# Patient Record
Sex: FEMALE | Race: WHITE | Employment: FULL TIME | URBAN - METROPOLITAN AREA
[De-identification: names, ages, dates, MRNs, and addresses within clinical notes are randomized per-mention and may not be internally consistent; named-entity substitution may affect disease eponyms.]

---

## 2017-06-20 ENCOUNTER — TRANSFERRED RECORDS (OUTPATIENT)
Dept: HEALTH INFORMATION MANAGEMENT | Facility: CLINIC | Age: 27
End: 2017-06-20

## 2017-06-23 ENCOUNTER — TRANSFERRED RECORDS (OUTPATIENT)
Dept: HEALTH INFORMATION MANAGEMENT | Facility: CLINIC | Age: 27
End: 2017-06-23

## 2017-06-30 ENCOUNTER — TRANSFERRED RECORDS (OUTPATIENT)
Dept: HEALTH INFORMATION MANAGEMENT | Facility: CLINIC | Age: 27
End: 2017-06-30

## 2017-11-09 ENCOUNTER — ONCOLOGY VISIT (OUTPATIENT)
Dept: ONCOLOGY | Facility: CLINIC | Age: 27
End: 2017-11-09
Attending: CLINICAL NURSE SPECIALIST
Payer: COMMERCIAL

## 2017-11-09 VITALS
DIASTOLIC BLOOD PRESSURE: 68 MMHG | SYSTOLIC BLOOD PRESSURE: 104 MMHG | TEMPERATURE: 98.2 F | HEART RATE: 71 BPM | OXYGEN SATURATION: 100 % | WEIGHT: 117 LBS

## 2017-11-09 DIAGNOSIS — Z15.01 GENETIC PREDISPOSITION TO BREAST CANCER: ICD-10-CM

## 2017-11-09 DIAGNOSIS — Z15.09 BRCA1 POSITIVE: Primary | ICD-10-CM

## 2017-11-09 DIAGNOSIS — Z15.01 BRCA1 POSITIVE: Primary | ICD-10-CM

## 2017-11-09 DIAGNOSIS — Z80.3 FAMILY HISTORY OF MALIGNANT NEOPLASM OF BREAST: ICD-10-CM

## 2017-11-09 PROCEDURE — 99204 OFFICE O/P NEW MOD 45 MIN: CPT | Performed by: CLINICAL NURSE SPECIALIST

## 2017-11-09 PROCEDURE — 99211 OFF/OP EST MAY X REQ PHY/QHP: CPT

## 2017-11-09 ASSESSMENT — PAIN SCALES - GENERAL: PAINLEVEL: NO PAIN (0)

## 2017-11-09 NOTE — MR AVS SNAPSHOT
After Visit Summary   11/9/2017    Ilda Hernandez    MRN: [id number]           Patient Information     Date Of Birth          1990        Visit Information        Provider Department      11/9/2017 9:00 AM Dianne William APRN [facility]        Today's Diagnoses     BRCA1 positive    -  1    Genetic predisposition to breast cancer        Family history of malignant neoplasm of breast          Care Instructions    Individualized Surveillance Plan for women  Hereditary Breast and/or Ovarian Cancer Syndrome   Per NCCN Guidelines Version 1.2018   Recommended screening Test or procedure Last done Next Scheduled    Breast self awareness- starting at age 18. Women should be familiar with their breasts and promptly report changes to their care provider. Periodic, consistent self exam may facilitate breast self awareness.    11/9/2017   ongoing   Breast screening, starting at age 25 Clinical breast exams every 6 -12 months 11/9/2017 Discuss - May or November 2018   Breast screening   Age 25-29 Annual breast MRI screening with contrast (preferred) or mammogram if MRI is unavailable or individualized based on family history if a breast cancer diagnosis before age 30 is present.       Breast MRI is performed preferably on day 7-15 of menstrual cycle for premenopausal women. 6/30/2017-R US, lower out quadrant - BiRads3    Rec. Follow up in 6 months NEXT: Breast MRI ordered today    Next: Nov. 2018   Breast screening   Age >30-75 years     Annual mammogram and   annual breast MRI, preferably on day 7-15 of menstrual cycle for premenopausal women.    Age>75 years, management should be considered on an individual basis.   NA   NA   Ovarian cancer screening, starting at age 30-35 Consider transvaginal ultrasound and  tests. Discuss pedigree - get copy if possible Discuss   Recommendation: risk-reducing salpingo-oophorectomy, typically between 35 and 40 years old and  upon  completion of child bearing.     Because ovarian cancer onset in patients with BRCA2 mutations is on average of 8-10 years later than in patients with BRCA1 mutations, it is reasonable to delay risk-reducing salpingo-oophorectomy until 40-45 years in patients with BRCA2 mutations who have already maximized their breast cancer prevention (i.e., undergone bilateral mastectomy).    Salpingectomy alone is not the standard of care for risk reduction although clinical trials are ongoing.     Discuss option of risk-reducing mastectomy.    Consider risks and benefits of risk reducing agents, such as tamoxifen and raloxifene for breast and ovarian cancer.    Consider a full body skin and eye exam for melanoma screening for both BRCA1+ and BRCA2+.     NOTE: Women with BRCA mutation who are treated for breast cancer should have screening of the remaining breast tissue with annual mammography and breast MRI.    The International Cancer of the Pancreas Screening (CAPS) Consortium recommends that individuals with a BRCA2 mutation who have at least one first-degree relative with pancreatic cancer should undergo initial screening with endoscopic ultrasonography and/or MRI/magnetic resonance cholangiopancreatography.                 Follow-ups after your visit        Additional Services     GENERAL SURG ADULT REFERRAL       Your provider has referred you to: PREFERRED PROVIDERS: Dr. Henderson, Dr. Berry and Dr. Liz Otero     Please be aware that coverage of these services is subject to the terms and limitations of your health insurance plan.  Call member services at your health plan with any benefit or coverage questions.      Please bring the following with you to your appointment:    (1) Any X-Rays, CTs or MRIs which have been performed.  Contact the facility where they were done to arrange for  prior to your scheduled appointment.   (2) List of current medications   (3) This referral request   (4) Any documents/labs given  to you for this referral            OB/GYN REFERRAL       Your provider has referred you to:  [facility]: [facility] [phone]   [email or web address]    Please be aware that coverage of these services is subject to the terms and limitations of your health insurance plan.  Call member services at your health plan with any benefit or coverage questions.      Please bring the following with you to your appointment:    (1) Any X-Rays, CTs or MRIs which have been performed.  Contact the facility where they were done to arrange for  prior to your scheduled appointment.   (2) List of current medications   (3) This referral request   (4) Any documents/labs given to you for this referral            PLASTIC SURGERY REFERRAL       Your provider has referred you to: [facility] [phone]   [email or web address]    Please be aware that coverage of these services is subject to the terms and limitations of your health insurance plan.  Call member services at your health plan with any benefit or coverage questions.      Please bring the following with you to your appointment:    (1) Any X-Rays, CTs or MRIs which have been performed.  Contact the facility where they were done to arrange for  prior to your scheduled appointment.    (2) List of current medications  (3) This referral request   (4) Any documents/labs given to you for this referral                  Follow-up notes from your care team     Return in about 6 months (around 5/9/2018) for Physical Exam.      Your next 10 appointments already scheduled     Nov 05, 2018  4:30 PM CST   MR BREAST BILATERAL W/O & W CONTRAST with SHMRP1   [facility] ([facility])    [address] [place] [id number] [phone]           Take your  medicines as usual, unless your doctor tells you not to. Bring a list of your current medicines to your exam (including vitamins, minerals and over-the-counter drugs).  The timing of your exam may depend on the start of your last period. If you re in menopause, you may have the exam anytime.  Please bring any previous mammograms or breast MRIs from other facilities to the MRI dept. Do not mail these items to us.  You will have contrast for this exam. To prepare:   The day before your exam, drink extra fluids at least six 8-ounce glasses (unless your doctor tells you to restrict your fluids).   Have a blood test (creatinine test) within 30 days of your exam. Go to your clinic or Diagnostic Imaging Department for this test.  The MRI machine uses a strong magnet. Please wear clothes without metal (snaps, zippers). A sweatsuit works well, or we may give you a hospital gown.  Please remove any body piercings and hair extensions before you arrive. You will also remove watches, jewelry, hairpins, wallets, dentures, partial dental plates and hearing aids. You may wear contact lenses, and you may be able to wear your rings. We have a safe place to keep your personal items, but it is safer to leave them at home.   **IMPORTANT** THE INSTRUCTIONS BELOW ARE ONLY FOR THOSE PATIENTS WHO HAVE BEEN TOLD THEY WILL RECEIVE SEDATION OR GENERAL ANESTHESIA DURING THEIR MRI PROCEDURE:  IF YOU WILL RECEIVE SEDATION (take medicine to help you relax during your exam)   You must get the medicine from your doctor before you arrive. Bring the medicine to the exam. Do not take it at home.   Arrive one hour early. Bring someone who can take you home after the test. Your medicine will make you sleepy. After the exam, you may not drive, take a bus or take a taxi by yourself.   No eating 8 hours before your exam. You may have clear liquids up until 4 hours before your exam. (Clear liquids include water, clear tea, black coffee and fruit juice  without pulp.)  IF YOU WILL RECEIVE ANESTHESIA (be asleep for your exam)   Arrive 1 1/2 hours early. Bring someone who can take you home after the test. You may not drive, take a bus or take a taxi by yourself.   No eating 8 hours before your exam. You may have clear liquids up until 4 hours before your exam. (Clear liquids include water, clear tea, black coffee and fruit juice without pulp.)  If you have any questions, please contact your Imaging Department exam site.              Who to contact     If you have questions or need follow up information about today's clinic visit or your schedule please contact [facility] directly at [phone].  Normal or non-critical lab and imaging results will be communicated to you by Exabrehart, letter or phone within 4 business days after the clinic has received the results. If you do not hear from us within 7 days, please contact the clinic through [employer] or phone. If you have a critical or abnormal lab result, we will notify you by phone as soon as possible.  Submit refill requests through [employer] or call your pharmacy and they will forward the refill request to us. Please allow 3 business days for your refill to be completed.          Additional Information About Your Visit        Cloneless Information     [employer] gives you secure access to your electronic health record. If you see a primary care provider, you can also send messages to your care team and make appointments. If you have questions, please call your primary care clinic.  If you do not have a primary care provider, please call [phone] and they will assist you.        Care EveryWhere ID     This is your Care EveryWhere ID. This could be used by other organizations to access your [facility] medical records  [id number]        Your Vitals Were     Pulse Temperature Pulse Oximetry             71 98.2  F (36.8  C) (Oral) 100%          Blood Pressure from Last 3 Encounters:   11/09/17 104/68     Weight from Last 3 Encounters:   11/09/17 53.1 kg (117 lb)              We Performed the Following     GENERAL SURG ADULT REFERRAL     OB/GYN REFERRAL     PLASTIC SURGERY REFERRAL        Primary Care Provider    None Specified       No primary provider on file.        Equal Access to Services     CHETAN HERNANDEZ : Hadii aad ku hadrobina Bautista, billy lukati, yimi kadoug vieira, radha hannain hayaabonita murrayestrellacarla perez. So [facility] [phone].    ATENCIÓN: Si habla español, tiene a reilly disposición servicios gratuitos de asistencia lingüística. Llame al [phone].    We comply with applicable federal civil rights laws and [place] laws. We do not discriminate on the basis of race, color, national origin, age, disability, sex, sexual orientation, or gender identity.            Thank you!     Thank you for choosing [facility]  for your care. Our goal is always to provide you with excellent care. Hearing back from our patients is one way we can continue to improve our services. Please take a few minutes to complete the written survey that you may receive in the mail after your visit with us. Thank you!             Your Updated Medication List - Protect others around you: Learn how to safely use, store and throw away your medicines at www.disposemymeds.org.      Notice  As of 11/9/2017 11:59 PM    You have not been prescribed any medications.

## 2017-11-09 NOTE — PATIENT INSTRUCTIONS
Individualized Surveillance Plan for women  Hereditary Breast and/or Ovarian Cancer Syndrome   Per NCCN Guidelines Version 1.2018   Recommended screening Test or procedure Last done Next Scheduled    Breast self awareness- starting at age 18. Women should be familiar with their breasts and promptly report changes to their care provider. Periodic, consistent self exam may facilitate breast self awareness.    11/9/2017   ongoing   Breast screening, starting at age 25 Clinical breast exams every 6 -12 months 11/9/2017 Discuss - May or November 2018   Breast screening   Age 25-29 Annual breast MRI screening with contrast (preferred) or mammogram if MRI is unavailable or individualized based on family history if a breast cancer diagnosis before age 30 is present.       Breast MRI is performed preferably on day 7-15 of menstrual cycle for premenopausal women. 6/30/2017-R US, lower out quadrant - BiRads3    Rec. Follow up in 6 months NEXT: Breast MRI ordered today    Next: Nov. 2018   Breast screening   Age >30-75 years     Annual mammogram and   annual breast MRI, preferably on day 7-15 of menstrual cycle for premenopausal women.    Age>75 years, management should be considered on an individual basis.   NA   NA   Ovarian cancer screening, starting at age 30-35 Consider transvaginal ultrasound and  tests. Discuss pedigree - get copy if possible Discuss   Recommendation: risk-reducing salpingo-oophorectomy, typically between 35 and 40 years old and  upon completion of child bearing.     Because ovarian cancer onset in patients with BRCA2 mutations is on average of 8-10 years later than in patients with BRCA1 mutations, it is reasonable to delay risk-reducing salpingo-oophorectomy until 40-45 years in patients with BRCA2 mutations who have already maximized their breast cancer prevention (i.e., undergone bilateral mastectomy).    Salpingectomy alone is not the standard of care for risk reduction although clinical trials  are ongoing.     Discuss option of risk-reducing mastectomy.    Consider risks and benefits of risk reducing agents, such as tamoxifen and raloxifene for breast and ovarian cancer.    Consider a full body skin and eye exam for melanoma screening for both BRCA1+ and BRCA2+.     NOTE: Women with BRCA mutation who are treated for breast cancer should have screening of the remaining breast tissue with annual mammography and breast MRI.    The International Cancer of the Pancreas Screening (CAPS) Consortium recommends that individuals with a BRCA2 mutation who have at least one first-degree relative with pancreatic cancer should undergo initial screening with endoscopic ultrasonography and/or MRI/magnetic resonance cholangiopancreatography.

## 2017-11-09 NOTE — PROGRESS NOTES
Oncology Rooming Note    November 9, 2017 9:01 AM   Ilda Hernandez is a 27 year old female who presents for:    Chief Complaint   Patient presents with     Oncology Clinic Visit     Initial Vitals: /68 (BP Location: Right arm, Patient Position: Chair, Cuff Size: Adult Regular)  Pulse 71  Temp 98.2  F (36.8  C) (Oral)  Wt 53.1 kg (117 lb)  SpO2 100% There is no height or weight on file to calculate BMI. There is no height or weight on file to calculate BSA.  No Pain (0) Comment: Data Unavailable   No LMP recorded.  Allergies reviewed: Yes  Medications reviewed: Yes    Medications: Medication refills not needed today.  Pharmacy name entered into EPIC: Data Unavailable    Clinical concerns: None     5 minutes for nursing intake (face to face time)     Jaimie Mendiola CMA

## 2017-11-09 NOTE — LETTER
Cancer Risk Management  Program Locations    [facility]  [facility]  [facility]  Mailing Address  Cancer Risk Management Program  [facility]  [address]  [place] [address]    New patient appointments  [phone]  November 9, 2017    Ilda Hernandez  [address] [place] [address]      Dear Ilda,    It was a pleasure meeting with you today.  Below is a copy of my note from our visit, outlining your surveillance plan.      I look forward to seeing you in the future to coordinate your care and reduce your health risks. Please feel free to contact me if you have any questions or concerns.      Oncology Rooming Note    November 9, 2017 9:01 AM   Ilda Hernandez is a 27 year old female who presents for:    Chief Complaint   Patient presents with     Oncology Clinic Visit     Initial Vitals: /68 (BP Location: Right arm, Patient Position: Chair, Cuff Size: Adult Regular)  Pulse 71  Temp 98.2  F (36.8  C) (Oral)  Wt 53.1 kg (117 lb)  SpO2 100% There is no height or weight on file to calculate BMI. There is no height or weight on file to calculate BSA.  No Pain (0) Comment: Data Unavailable   No LMP recorded.  Allergies reviewed: Yes  Medications reviewed: Yes    Medications: Medication refills not needed today.  Pharmacy name entered into EPIC: Data Unavailable    Clinical concerns: None     5 minutes for nursing intake (face to face time)     Jaimie Mendiola CMA                Oncology Risk Management Consultation:  Date on this visit: 11/9/2017    Ilda Hernandez  is self referred  for an oncology risk management consultation. She requires evaluation for her risk of cancer secondary to having a deleterious BRCA1 mutation and is considered to be at high risk for hereditary breast and ovarian  cancer. She has been having high risk screening for several years at [facility] and most recently at [facility]. She  recently moved to [place] and wants to continue her screening plan.     Primary Physician: No primary care provider on file.     History Of Present Illness:  Ms. Hernandez is a very pleasant, healthy 27 year old female who presents with family history of breast and ovarian cancer and a personal diagnosis of a BRCA1 mutation.     Genetic Testing:  Reports BRCA1+, records requested from [facility] (under the name of Ilda Redman).    Pertinent history:  January 2012 - Breast biopsy for lump in L breast, pathology showed fibroadenoma.    Pertinent screening history:  8/17/2014 - Breast MRI ([facility]), BiRads3, stable 12 o'clock area of R breast, dense breasts noted.    12/22/2014 - B Ultrasound, BiRads2  8/18/2015 - Breast MRI, stable, BiRads1  6/23/2016 - Breast MRI and B ultrasound, negative ([facility])  6/20/2017 - Breast MRI ([facility]), heterogeneously dense breasts noted; focal area of non mass enhancement in extreme lower slightly outer area of R breast; targeted US recommended.       Past Medical/Surgical History:  Past Medical History:   Diagnosis Date     BRCA1 positive 2012     Past Surgical History:   Procedure Laterality Date     BREAST LUMPECTOMY, RT/LT Left 01/2012    fibroadenoma       Allergies:  Allergies as of 11/09/2017     (No Known Allergies)       Current Medications:  No current outpatient prescriptions on file.        Family History:  Family History   Problem Relation Age of Onset     Breast Cancer Mother 42     Hereditary Breast and Ovarian Cancer Syndrome Mother      BRCA1+     Breast Cancer Maternal Grandmother 27       Social History:  Social History     Social History     Marital status:      Spouse name: N/A     Number of children: 0     Years of education: N/A     Occupational  History           Social History Main Topics     Smoking status: Never Smoker     Smokeless tobacco: Never Used     Alcohol use Yes      Comment: maybe 1/2 to 1 per week     Drug use: No     Sexual activity: Yes     Partners: Male     Birth control/ protection: Condom     Other Topics Concern     Not on file     Social History Narrative     No narrative on file       Review of Systems:  GENERAL: No change in weight, sleep or appetite.  Normal energy.  No fever or chills  EYES: Negative for vision changes or eye problems  ENT: No problems with ears, nose or throat.  No difficulty swallowing.  RESP: No coughing, wheezing or shortness of breath  CV: No chest pains or palpitations  GI: No nausea, vomiting,  heartburn, abdominal pain, diarrhea, constipation or change in bowel habits  : No urinary frequency or dysuria, bladder or kidney problems  MUSCULOSKELETAL: No significant muscle or joint pains  NEUROLOGIC: No headaches, numbness, tingling, weakness, problems with balance or coordination  PSYCHIATRIC: No problems with anxiety, depression or mental health  HEME/IMMUNE/ALLERGY: No history of bleeding or clotting problems or anemia.  No allergies or immune system problems  ENDOCRINE: No history of thyroid disease, diabetes or other endocrine disorders  SKIN: No rashes,worrisome lesions or skin problems    Physical Exam:  /68 (BP Location: Right arm, Patient Position: Chair, Cuff Size: Adult Regular)  Pulse 71  Temp 98.2  F (36.8  C) (Oral)  Wt 53.1 kg (117 lb)  SpO2 100%    GENERAL APPEARANCE: healthy, alert and no apparent distress  NECK: no adenopathy, no asymmetry or masses     RESP: lungs clear to auscultation - no rales, rhonchi or wheezes    BREAST: A multipositional, bilateral breast exam was performed. Very symmetrical breasts. Right breast: no palpable dominant masses, no nipple discharge, no skin changes. Dense tissue.  Right axilla: no palpable adenopathy. Left breast: no palpable  dominant masses, no nipple discharge, no skin changes. Left axilla: no palpable adenopathy. Dense tissue.    LYMPHATICS: No cervical, supraclavicular or axillary lymphadenopathy     CARDIOVASCULAR: regular rate and rhythm, normal S1 S2, no S3 or S4 and no murmur.       SKIN: no suspicious lesions or rashes  Laboratory/Imaging Studies  No results found for this or any previous visit.    ASSESSMENT  We discussed the differences between cancer risk for the general population and those with BRCA mutations. Women with BRCA mutations have a 40-80% lifetime risk of breast cancer, compared to the general population risk of 12%. Those with a BRCA mutation also bear a 10-40% lifetime risk of ovarian cancer, compared to the 1-2% lifetime risk within the general population.      We also discussed that inheriting a mutation does not mean that a person will develop cancer, but rather that they are at increased risk.       We also discussed the risks and benefits of risk reduction mastectomy, which could decrease her risk of breast cancer by 90%. She is aware of this as an option and is interested in pursuing this further. Her previous health care providers have suggested that she should meet with Dr. Henderson to discuss this further.  I have put in several surgical referrals for her as she is interested in exploring her options.  She is also connected to the [employer] network and may be attending meetings locally in order to learn more about breast surgery experiences locally.    We also discussed the risks and benefits of prophylactic bilateral salpingo-ooperectomy after child bearing. She is planning to start a family within the next couple of years.    We discussed recommendations for birth control. A large meta-analysis of the literature showed that in a total of 2855 breast cancer cases and 1503 ovarian cancer cases, carrying an ascertained BRCA1/2 mutation in 18 studies that in oral contraceptive formulations used prior to 1975  women showed a significant increased risk of breast cancer (SRR: 1.47; 95% 1.06, 2.04).  However, there is no evidence of a significant association with the use of more recent formulations (SRR: 1.17; 95% 0.74, 1.86). The use of oral contraceptives was associated with a significant reduced risk of ovarian cancer for BRCA1/2 carriers (summary relative risk (SRR) = 0.50; 95% confidence interval (CI), 0.33-0.75) with a significant 36% risk reduction in ovarian cancers for each additional 10 years of oral contraceptive use (SRR: 0.64; 95% CI, 0.53-0.78; P trend < 0.01). (James et al.2010.Oral contraceptive use and breast or ovarian cancer risk in BRCA1/2 carriers: A meta-analysis.  Journal of Cancer. 46:12: 4506-7591.     We also discussed following  a healthy lifestyle plan recommended by both NCCN and the American Cancer Society that can reduce the risk of cancer:  1 Limit alcohol consumption to less than 1 drink per day (1 drink=5 oz.wine, 12 oz. Beer or 1.5 oz. 80-proof liquor).  2. Exercise per American Cancer Society guidelines of at least 150 minutes of moderate-intensity activity or 75 minutes of vigorous activity each week. (Or a combination of both.) Exercise should be spread  out over the week.  3. Maintain a healthy weight with a Body Mass Index between 19-24.9.  4. Do not use tobacco products and limit exposure to passive smoke.    INDIVIDUALIZED CANCER RISK MANAGEMENT PLAN:  Individualized Surveillance Plan for women  Hereditary Breast and/or Ovarian Cancer Syndrome   Per NCCN Guidelines Version 1.2018   Recommended screening Test or procedure Last done Next Scheduled    Breast self awareness- starting at age 18. Women should be familiar with their breasts and promptly report changes to their care provider. Periodic, consistent self exam may facilitate breast self awareness.    11/9/2017   ongoing   Breast screening, starting at age 25 Clinical breast exams every 6 -12 months 11/9/2017  May  2018    Breast screening   Age 25-29 Annual breast MRI screening with contrast (preferred) or mammogram if MRI is unavailable or individualized based on family history if a breast cancer diagnosis before age 30 is present.       Breast MRI is performed preferably on day 7-15 of menstrual cycle for premenopausal women. 6/30/2017-R US, lower out quadrant - BiRads3    Rec. Follow up in 6 months NEXT: Breast MRI ordered today     Breast screening   Age >30-75 years     Annual mammogram and   annual breast MRI, preferably on day 7-15 of menstrual cycle for premenopausal women.    Age>75 years, management should be considered on an individual basis.   NA   NA   Ovarian cancer screening, starting at age 30-35 Consider transvaginal ultrasound and  tests. Discuss pedigree - get copy if possible Discuss   Recommendation: risk-reducing salpingo-oophorectomy, typically between 35 and 40 years old and  upon completion of child bearing.     Because ovarian cancer onset in patients with BRCA2 mutations is on average of 8-10 years later than in patients with BRCA1 mutations, it is reasonable to delay risk-reducing salpingo-oophorectomy until 40-45 years in patients with BRCA2 mutations who have already maximized their breast cancer prevention (i.e., undergone bilateral mastectomy).    Salpingectomy alone is not the standard of care for risk reduction although clinical trials are ongoing.     Discuss option of risk-reducing mastectomy.    Consider risks and benefits of risk reducing agents, such as tamoxifen and raloxifene for breast and ovarian cancer.    Consider a full body skin and eye exam for melanoma screening for both BRCA1+ and BRCA2+.     NOTE: Women with BRCA mutation who are treated for breast cancer should have screening of the remaining breast tissue with annual mammography and breast MRI.    The International Cancer of the Pancreas Screening (CAPS) Consortium recommends that individuals with a BRCA2 mutation who have  at least one first-degree relative with pancreatic cancer should undergo initial screening with endoscopic ultrasonography and/or MRI/magnetic resonance cholangiopancreatography.       I will be happy to work with her to manage her cancer risk, will follow up on her screenings and see her in the Cancer Risk Management clinic in six months.    I spent 47 minutes with the patient with greater that 50% of it in counseling and coordinating care as documented above.    Dianne William, APRN-CNS, OCN, ANG-BC  Clinical Nurse Specialist  Cancer Risk Management Program  [address]  [place] [address]    phone:  [phone]  Pager: [phone]  fax: [phone]    Cc: MD Anita Jackson MD Umar Choudry, MD Sarah Wildenberg, MD

## 2017-12-13 ENCOUNTER — HOSPITAL ENCOUNTER (OUTPATIENT)
Dept: MRI IMAGING | Facility: CLINIC | Age: 27
Discharge: HOME | End: 2017-12-13
Attending: CLINICAL NURSE SPECIALIST | Admitting: CLINICAL NURSE SPECIALIST
Payer: COMMERCIAL

## 2017-12-13 DIAGNOSIS — Z15.01 BRCA1 POSITIVE: ICD-10-CM

## 2017-12-13 DIAGNOSIS — Z80.3 FAMILY HISTORY OF MALIGNANT NEOPLASM OF BREAST: ICD-10-CM

## 2017-12-13 DIAGNOSIS — Z15.09 BRCA1 POSITIVE: ICD-10-CM

## 2017-12-13 DIAGNOSIS — Z15.01 GENETIC PREDISPOSITION TO BREAST CANCER: ICD-10-CM

## 2017-12-13 PROCEDURE — A9585 GADOBUTROL INJECTION: HCPCS | Performed by: CLINICAL NURSE SPECIALIST

## 2017-12-13 PROCEDURE — 0159T MR BREAST BILATERAL W/O & W CONTRAST: CPT

## 2017-12-13 PROCEDURE — 25000128 H RX IP 250 OP 636: Performed by: CLINICAL NURSE SPECIALIST

## 2017-12-13 RX ORDER — GADOBUTROL 604.72 MG/ML
5 INJECTION INTRAVENOUS ONCE
Status: COMPLETED | OUTPATIENT
Start: 2017-12-13 | End: 2017-12-13

## 2017-12-13 RX ADMIN — GADOBUTROL 6 ML: 604.72 INJECTION INTRAVENOUS at 17:02

## 2017-12-15 ENCOUNTER — ONCOLOGY VISIT (OUTPATIENT)
Dept: ONCOLOGY | Facility: CLINIC | Age: 27
End: 2017-12-15
Attending: SURGERY
Payer: COMMERCIAL

## 2017-12-15 DIAGNOSIS — Z15.01 BRCA1 POSITIVE: Primary | ICD-10-CM

## 2017-12-15 DIAGNOSIS — Z15.09 BRCA1 POSITIVE: Primary | ICD-10-CM

## 2017-12-15 PROCEDURE — 99212 OFFICE O/P EST SF 10 MIN: CPT | Mod: ZF

## 2017-12-15 NOTE — NURSING NOTE
Oncology Rooming Note    December 15, 2017 9:16 AM   Ilda Hernandez is a 27 year old female who presents for:    Chief Complaint   Patient presents with     Oncology Clinic Visit     New Patient BRCA pos     Initial Vitals: There were no vitals taken for this visit. There is no height or weight on file to calculate BMI. There is no height or weight on file to calculate BSA.  Data Unavailable Comment: Data Unavailable   No LMP recorded.  Allergies reviewed: Yes  Medications reviewed: Yes    Medications: Medication refills not needed today.  Pharmacy name entered into EPIC: Data Unavailable    Clinical concerns: new patient.  Dr. Henderson was NOT notified.    10 minutes for nursing intake (face to face time)   Patient has had her flu shot for 2017 at work in October.    Jovana Bedoya, CMA

## 2017-12-15 NOTE — MR AVS SNAPSHOT
After Visit Summary   12/15/2017    Ilda Hernandez    MRN: [id number]           Patient Information     Date Of Birth          1990        Visit Information        Provider Department      12/15/2017 9:00 AM Jose Daniel Henderson MD [facility]        Today's Diagnoses     BRCA1 positive    -  1       Follow-ups after your visit        Your next 10 appointments already scheduled     Feb 06, 2018  7:15 AM CST   (Arrive by 7:00 AM)   New Patient Visit with ALPESH Falcon MD   [facility] ([facility])    [address] [place] [id number] [phone]              Who to contact     If you have questions or need follow up information about today's clinic visit or your schedule please contact [facility] directly at [phone].  Normal or non-critical lab and imaging results will be communicated to you by [employer], letter or phone within 4 business days after the clinic has received the results. If you do not hear from us within 7 days, please contact the clinic through [employer] or phone. If you have a critical or abnormal lab result, we will notify you by phone as soon as possible.  Submit refill requests through [employer]PTV or call your pharmacy and they will forward the refill request to us. Please allow 3 business days for your refill to be completed.          Additional Information About Your Visit        MyChart Information     PPTV gives you secure access to your electronic health record. If you see a primary care provider, you can also send messages to your care team and make appointments. If you have questions, please call your primary care clinic.  If you do not have a primary care provider, please call [phone] and they will assist you.        Care EveryWhere ID     This is your Care EveryWhere ID. This could be used by other organizations to access your [facility] medical records  [id number]          Blood Pressure from Last 3 Encounters:   11/09/17 104/68    Weight from Last 3 Encounters:   11/09/17 53.1 kg (117 lb)              Today, you had the following     No orders found for display       Primary Care Provider Fax #    Physician No Ref-Primary [phone]       No address on file        Equal Access to Services     CHETAN RUELASJACKLYN : Hadii trini ku haddamiáno Soomaali, waaxda luqadaha, qaybta kaalmada aderosario, radha murrayestrellacarla gonzalez . So [facility] [phone].    ATENCIÓN: Si habla español, tiene a reilly disposición servicios gratuitos de asistencia lingüística. Llame al [phone].    We comply with applicable federal civil rights laws and [place] laws. We do not discriminate on the basis of race, color, national origin, age, disability, sex, sexual orientation, or gender identity.            Thank you!     Thank you for choosing [facility]  for your care. Our goal is always to provide you with excellent care. Hearing back from our patients is one way we can continue to improve our services. Please take a few minutes to complete the written survey that you may receive in the mail after your visit with us. Thank you!             Your Updated Medication List - Protect others around you: Learn how to safely use, store and throw away your medicines at www.disposemymeds.org.      Notice  As of 12/15/2017 11:59 PM    You have not been prescribed any medications.

## 2017-12-15 NOTE — PROGRESS NOTES
Ilda Hernandez is a 27-year-old woman I was asked to see at the request of Dianne William for evaluation of a BRCA1 mutation.  The patient has a strong family history of breast cancer including her mother, maternal aunt and maternal grandmother.  Her mother was tested positive for BRCA1 mutation, and several years ago, Ilda underwent testing and also has a BRCA1 mutation.  Since that time, she has undergone screening with MRI.  She has had no breast problems except for a benign fibroadenoma that was removed in the past.  She has no breast complaints today.  She is here to talk about the role of risk-reduction mastectomy.      PAST MEDICAL HISTORY:  No major medical problems.  She takes no medications on a regular basis.      PHYSICAL EXAMINATION:  Not performed.      IMPRESSION:  BRCA1 mutation.      PLAN:  The patient understands her high risk of breast cancer and ovarian cancer.  She is interested in still having children and does not want to have a risk-reduction oophorectomy at this time.  She would like to have a bilateral mastectomy with immediate reconstruction sometime in the summer.  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  I talked about the role of nipple preservation, and I think she is a good candidate for that.  I talked to her about the risks and complications of surgery.  I am going to have her see a couple of plastic surgeons for their input.  I would like to see her back in the spring to help plan her surgical procedure.        TT:  40 minutes.  CT:  40 minutes.      cc:   GIULIANO Brooks, [facility]   [address] [place] [address]  [phone]

## 2018-01-09 ENCOUNTER — TRANSFERRED RECORDS (OUTPATIENT)
Dept: HEALTH INFORMATION MANAGEMENT | Facility: CLINIC | Age: 28
End: 2018-01-09

## 2018-01-21 ENCOUNTER — HEALTH MAINTENANCE LETTER (OUTPATIENT)
Age: 28
End: 2018-01-21

## 2018-02-08 ENCOUNTER — MYC MEDICAL ADVICE (OUTPATIENT)
Dept: PLASTIC SURGERY | Facility: CLINIC | Age: 28
End: 2018-02-08

## 2018-02-20 ENCOUNTER — OFFICE VISIT (OUTPATIENT)
Dept: PLASTIC SURGERY | Facility: CLINIC | Age: 28
End: 2018-02-20
Attending: PLASTIC SURGERY
Payer: COMMERCIAL

## 2018-02-20 VITALS
TEMPERATURE: 97 F | BODY MASS INDEX: 19.27 KG/M2 | DIASTOLIC BLOOD PRESSURE: 59 MMHG | HEART RATE: 64 BPM | OXYGEN SATURATION: 100 % | HEIGHT: 66 IN | WEIGHT: 119.9 LBS | SYSTOLIC BLOOD PRESSURE: 99 MMHG

## 2018-02-20 DIAGNOSIS — Z15.09 BRCA GENE POSITIVE: Primary | ICD-10-CM

## 2018-02-20 DIAGNOSIS — Z15.01 BRCA GENE POSITIVE: Primary | ICD-10-CM

## 2018-02-20 DIAGNOSIS — Z15.09 BRCA1 POSITIVE: Primary | ICD-10-CM

## 2018-02-20 DIAGNOSIS — Z15.01 BRCA1 POSITIVE: Primary | ICD-10-CM

## 2018-02-20 PROCEDURE — G0463 HOSPITAL OUTPT CLINIC VISIT: HCPCS | Mod: ZF

## 2018-02-20 ASSESSMENT — PAIN SCALES - GENERAL: PAINLEVEL: NO PAIN (0)

## 2018-02-20 NOTE — NURSING NOTE
"Oncology Rooming Note    February 20, 2018 7:58 AM   Ilda Hernandez is a 27 year old female who presents for:    Chief Complaint   Patient presents with     Oncology Clinic Visit     New Patient-Consult     Initial Vitals: BP 99/59 (BP Location: Right arm, Patient Position: Sitting, Cuff Size: Adult Regular)  Pulse 64  Temp 97  F (36.1  C) (Oral)  Ht 1.676 m (5' 6\")  Wt 54.4 kg (119 lb 14.4 oz)  LMP 01/18/2018  SpO2 100%  BMI 19.35 kg/m2 Estimated body mass index is 19.35 kg/(m^2) as calculated from the following:    Height as of this encounter: 1.676 m (5' 6\").    Weight as of this encounter: 54.4 kg (119 lb 14.4 oz). Body surface area is 1.59 meters squared.  No Pain (0) Comment: Data Unavailable   Patient's last menstrual period was 01/18/2018.  Allergies reviewed: Yes  Medications reviewed: Yes    Medications: Medication refills not needed today.  Pharmacy name entered into EPIC: Data Unavailable    Clinical concerns: Questions Dr. Falcon was notified.    10 minutes for nursing intake (face to face time)     Lupe Barraza LPN              "

## 2018-02-20 NOTE — PROGRESS NOTES
REFERRING PHYSICIAN:  Jose Daniel Henderson MD, [facility]       PRESENTING COMPLAINT:  Consultation for breast reconstruction.      HISTORY OF PRESENT ILLNESS:  Ms. Hernandez is 27 years old.  She has been diagnosed with BRCA1 gene mutation and is contemplating bilateral prophylactic mastectomy and is interested in immediate reconstruction.  She is here to discuss options.  She is a teacher by profession.  Has an active lifestyle, works out 3-4 times a week, has no back pain.  Wears a C-cup bra.  She would like to be around a cup size larger.      PAST MEDICAL HISTORY:  Nil.      PAST SURGICAL HISTORY:  Fibroadenoma excision from the left breast.      MEDICATIONS:  Nil.      ALLERGIES:  Nil.      SOCIAL HISTORY:  Does not smoke.  Drinks socially.  Works as a teacher in [place].  Lives in [place].      REVIEW OF SYSTEMS:  Denies chest pain, shortness of breath, MI, CVA, DVT and PE.      PHYSICAL EXAMINATION:  On exam vital signs stable.  She is afebrile in no obvious distress.  She is 5 feet 6 inches, 119 pounds with a BMI of 19 kg/m2.  On examination of her breasts, she has relatively symmetric breasts, left side may be slightly larger than the right side.  Sternal notch to nipple distance 30 cm.  She has grade 0 ptosis of her nipple areolar complexes.  On my estimation, she is a large B cup.  She has very little extra tissue in the abdomen.  She has no obvious hernia.  Skin pinch thickness under 2 cm.  No scars on the back.      ASSESSMENT AND PLAN:  Based upon the above findings, a diagnosis of BRCA1 gene mutation was made.  I had a long discussion with the patient and her  about breast reconstruction.  Gave them the overview of breast reconstruction, immediate versus delayed, implant-based versus autologous reconstruction were all explained.  Had a discussion with her about nipple-sparing mastectomy.  In my opinion, the best aesthetic results would be if she did a nipple-sparing mastectomy through the inframammary  fold/lateral mammary fold incision with an immediate expander-based reconstruction followed by placement of permanent implant of the second stage.  She does not have the anatomy for a TRAM flap breast reconstruction.  The staged nature of the reconstruction was explained in detail.  The important variable blood flow to the skin and nipple were also explained to the patient in detail.  The use of SPY angiogram intraoperatively was explained in detail.  The placement in the subcutaneous plane, the use of acellular dermal matrix was also explained.  All risks, benefits and alternatives including pain, infection, bleeding, scarring, asymmetry, seromas, hematomas, wound breakdown, wound dehiscence, infections, requirement of staged reconstructions, seromas, hematomas, loss of the implant sensation, loss of the breast, DVT, PE, MI, CVA, pneumonia, renal failure and death were all explained.  It was very clear that implant-based reconstructions are temporary and that need replacement over time given the failure of the implants.  They understood that.  All exam and discussion done in the presence of my nurse.  Consent obtained.  Photographs obtained.  Plan now is to proceed when the patient sees fit in the near future.  I look forward to helping her out in the near future.      Total time spent with patient 30 minutes, of which more than half was counseling.      cc:   Jose Daniel Henderson MD   [facility]

## 2018-02-20 NOTE — LETTER
2/20/2018       RE: Ilda Hernandez  [address]  [place] [address]     Dear Colleague,    Thank you for referring your patient, Ilda Hernandez, to the [facility]. Please see a copy of my visit note below.    REFERRING PHYSICIAN:  Jose Daniel Henderson MD, [facility] Surgery       PRESENTING COMPLAINT:  Consultation for breast reconstruction.      HISTORY OF PRESENT ILLNESS:  Ms. Hernandez is 27 years old.  She has been diagnosed with BRCA1 gene mutation and is contemplating bilateral prophylactic mastectomy and is interested in immediate reconstruction.  She is here to discuss options.  She is a teacher by profession.  Has an active lifestyle, works out 3-4 times a week, has no back pain.  Wears a C-cup bra.  She would like to be around a cup size larger.      PAST MEDICAL HISTORY:  Nil.      PAST SURGICAL HISTORY:  Fibroadenoma excision from the left breast.      MEDICATIONS:  Nil.      ALLERGIES:  Nil.      SOCIAL HISTORY:  Does not smoke.  Drinks socially.  Works as a teacher in [place].  Lives in [place].      REVIEW OF SYSTEMS:  Denies chest pain, shortness of breath, MI, CVA, DVT and PE.      PHYSICAL EXAMINATION:  On exam vital signs stable.  She is afebrile in no obvious distress.  She is 5 feet 6 inches, 119 pounds with a BMI of 19 kg/m2.  On examination of her breasts, she has relatively symmetric breasts, left side may be slightly larger than the right side.  Sternal notch to nipple distance 30 cm.  She has grade 0 ptosis of her nipple areolar complexes.  On my estimation, she is a large B cup.  She has very little extra tissue in the abdomen.  She has no obvious hernia.  Skin pinch thickness under 2 cm.  No scars on the back.      ASSESSMENT AND PLAN:  Based upon the above findings, a diagnosis of BRCA1 gene mutation was made.  I had a long discussion with the patient and her  about breast reconstruction.  Gave them the overview of breast  reconstruction, immediate versus delayed, implant-based versus autologous reconstruction were all explained.  Had a discussion with her about nipple-sparing mastectomy.  In my opinion, the best aesthetic results would be if she did a nipple-sparing mastectomy through the inframammary fold/lateral mammary fold incision with an immediate expander-based reconstruction followed by placement of permanent implant of the second stage.  She does not have the anatomy for a TRAM flap breast reconstruction.  The staged nature of the reconstruction was explained in detail.  The important variable blood flow to the skin and nipple were also explained to the patient in detail.  The use of SPY angiogram intraoperatively was explained in detail.  The placement in the subcutaneous plane, the use of acellular dermal matrix was also explained.  All risks, benefits and alternatives including pain, infection, bleeding, scarring, asymmetry, seromas, hematomas, wound breakdown, wound dehiscence, infections, requirement of staged reconstructions, seromas, hematomas, loss of the implant sensation, loss of the breast, DVT, PE, MI, CVA, pneumonia, renal failure and death were all explained.  It was very clear that implant-based reconstructions are temporary and that need replacement over time given the failure of the implants.  They understood that.  All exam and discussion done in the presence of my nurse.  Consent obtained.  Photographs obtained.  Plan now is to proceed when the patient sees fit in the near future.  I look forward to helping her out in the near future.      Total time spent with patient 30 minutes, of which more than half was counseling.        Again, thank you for allowing me to participate in the care of your patient.      Sincerely,    ALPESH Falcon MD    cc:   Jose Daniel Henderson MD   [facility]

## 2018-02-20 NOTE — MR AVS SNAPSHOT
After Visit Summary   2/20/2018    Ilda Hernandez    MRN: [id number]           Patient Information     Date Of Birth          1990        Visit Information        Provider Department      2/20/2018 7:15 AM ALPESH Falcon MD [facility]        Today's Diagnoses     BRCA1 positive    -  1       Follow-ups after your visit        Follow-up notes from your care team     Return if symptoms worsen or fail to improve.      Your next 10 appointments already scheduled     Mar 12, 2018  4:30 PM CDT   PHYSICAL with Cherrie Wadsworth MD   [facility]    [address] [place] [address]  [address]  [facility] [address] [phone]            Mar 30, 2018  9:00 AM CDT   (Arrive by 8:45 AM)   Return Visit with Jose Daniel Henderson MD   [facility] ([facility])    [address]  [address]  [facility] [address] [phone]              Who to contact     If you have questions or need follow up information about today's clinic visit or your schedule please contact [facility] directly at [phone].  Normal or non-critical lab and imaging results will be communicated to you by MyChart, letter or phone within 4 business days after the clinic has received the results. If you do not hear from us within 7 days, please contact the clinic through Doctolibhart or phone. If you have a critical or abnormal lab result, we will notify you by phone as soon as possible.  Submit refill requests through [facility] or call your pharmacy and they will forward the refill request to us. Please allow 3 business days for your refill to be completed.          Additional Information About Your Visit        MyChart Information     [facility] gives you secure access to your electronic health record. If you see a primary care provider, you can also send messages to your care team and make appointments. If you have questions, please  "call your primary care clinic.  If you do not have a primary care provider, please call [phone] and they will assist you.        Care EveryWhere ID     This is your Care EveryWhere ID. This could be used by other organizations to access your [facility] medical records  [id number]        Your Vitals Were     Pulse Temperature Height Last Period Pulse Oximetry BMI (Body Mass Index)    64 97  F (36.1  C) (Oral) 5' 6\" 01/18/2018 100% 19.35 kg/m2       Blood Pressure from Last 3 Encounters:   02/20/18 99/59   11/09/17 104/68    Weight from Last 3 Encounters:   02/20/18 119 lb 14.4 oz   11/09/17 117 lb              Today, you had the following     No orders found for display       Primary Care Provider Fax #    Physician No Ref-Primary [phone]       No address on file        Equal Access to Services     CHETAN HERNANDEZ : Hadii trini Bautista, wacamila conde, yimi millsaldoris vieira, radha gonzalez . So [facility] [phone].    ATENCIÓN: Si habla español, tiene a reilly disposición servicios gratuitos de asistencia lingüística. Llame al [phone].    We comply with applicable federal civil rights laws and [place] laws. We do not discriminate on the basis of race, color, national origin, age, disability, sex, sexual orientation, or gender identity.            Thank you!     Thank you for choosing [facility]  for your care. Our goal is always to provide you with excellent care. Hearing back from our patients is one way we can continue to improve our services. Please take a few minutes to complete the written survey that you may receive in the mail after your visit with us. Thank you!             Your Updated Medication List - Protect others around you: Learn how to safely use, store and throw away your medicines at www.disposemymeds.org.      Notice  As of 2/20/2018 10:26 AM    You have not been prescribed any medications.      "

## 2018-02-26 ENCOUNTER — TELEPHONE (OUTPATIENT)
Dept: SURGERY | Facility: CLINIC | Age: 28
End: 2018-02-26

## 2018-02-26 DIAGNOSIS — Z15.01 BRCA1 POSITIVE: Primary | ICD-10-CM

## 2018-02-26 DIAGNOSIS — Z15.09 BRCA1 POSITIVE: Primary | ICD-10-CM

## 2018-03-12 ENCOUNTER — OFFICE VISIT (OUTPATIENT)
Dept: OBGYN | Facility: CLINIC | Age: 28
End: 2018-03-12
Payer: COMMERCIAL

## 2018-03-12 VITALS
BODY MASS INDEX: 18.87 KG/M2 | WEIGHT: 117.4 LBS | HEIGHT: 66 IN | OXYGEN SATURATION: 99 % | SYSTOLIC BLOOD PRESSURE: 106 MMHG | HEART RATE: 85 BPM | DIASTOLIC BLOOD PRESSURE: 62 MMHG

## 2018-03-12 DIAGNOSIS — Z12.4 PAP SMEAR FOR CERVICAL CANCER SCREENING: ICD-10-CM

## 2018-03-12 DIAGNOSIS — Z01.419 ENCOUNTER FOR GYNECOLOGICAL EXAMINATION WITHOUT ABNORMAL FINDING: Primary | ICD-10-CM

## 2018-03-12 DIAGNOSIS — Z15.09 BRCA1 POSITIVE: ICD-10-CM

## 2018-03-12 DIAGNOSIS — Z13.220 SCREENING FOR LIPOID DISORDERS: ICD-10-CM

## 2018-03-12 DIAGNOSIS — Z15.01 BRCA1 POSITIVE: ICD-10-CM

## 2018-03-12 LAB
ERYTHROCYTE [DISTWIDTH] IN BLOOD BY AUTOMATED COUNT: 13.7 % (ref 10–15)
HCT VFR BLD AUTO: 38.8 % (ref 35–47)
HGB BLD-MCNC: 12.4 G/DL (ref 11.7–15.7)
MCH RBC QN AUTO: 26.7 PG (ref 26.5–33)
MCHC RBC AUTO-ENTMCNC: 32 G/DL (ref 31.5–36.5)
MCV RBC AUTO: 84 FL (ref 78–100)
PLATELET # BLD AUTO: 226 10E9/L (ref 150–450)
RBC # BLD AUTO: 4.64 10E12/L (ref 3.8–5.2)
WBC # BLD AUTO: 8.5 10E9/L (ref 4–11)

## 2018-03-12 PROCEDURE — 86304 IMMUNOASSAY TUMOR CA 125: CPT | Performed by: OBSTETRICS & GYNECOLOGY

## 2018-03-12 PROCEDURE — G0145 SCR C/V CYTO,THINLAYER,RESCR: HCPCS | Performed by: OBSTETRICS & GYNECOLOGY

## 2018-03-12 PROCEDURE — 80061 LIPID PANEL: CPT | Performed by: OBSTETRICS & GYNECOLOGY

## 2018-03-12 PROCEDURE — 99385 PREV VISIT NEW AGE 18-39: CPT | Performed by: OBSTETRICS & GYNECOLOGY

## 2018-03-12 PROCEDURE — 85027 COMPLETE CBC AUTOMATED: CPT | Performed by: OBSTETRICS & GYNECOLOGY

## 2018-03-12 PROCEDURE — 36415 COLL VENOUS BLD VENIPUNCTURE: CPT | Performed by: OBSTETRICS & GYNECOLOGY

## 2018-03-12 NOTE — PROGRESS NOTES
Ilda is a 27 year old  female who presents for annual exam.     Menses are regular q 28-33 days and irregular lasting 6 days.  Menses flow: normal.  No LMP recorded. Patient is not currently having periods (Reason: Irregular Periods).. Using NFP for contraception.  She is not currently considering pregnancy.  Besides routine health maintenance, she has no other health concerns today . She is using NFP and withdrawal for preventing pregnant,but plans a baby in next few years. Moved here from [place] with spouse in 2017 and he is in school for next 3 yrs here.   She found out she is BRCA 1 positive and has a double mastectomy planned for 2018. Has not met with a counselor since her findings. Wants to know what other testing should be done for ovaries.   GYNECOLOGIC HISTORY:  Menarche:   Ilda is sexually active with 1male partner(s) and is currently in monogamous relationship.    History sexually transmitted infections:No STD history  STI testing offered?  Declined  DALE exposure: Unknown  History of abnormal Pap smear: NO - age 21-29 PAP every 3 years recommended  Family history of breast CA: Yes (Please explain): mom and mgm  Family history of uterine/ovarian CA: No    Family history of colon CA: No    HEALTH MAINTENANCE:  Cholesterol: (No results found for: CHOL History of abnormal lipids: na  Mammo: na . History of abnormal Mammo: Not applicable.  Regular Self Breast Exams: Yes  Calcium/Vitamin D intake: source:  dairy Adequate? Yes  TSH: (No results found for: TSH )  Pap; (No results found for: PAP )    HISTORY:  Obstetric History       T0      L0     SAB0   TAB0   Ectopic0   Multiple0   Live Births0         Past Medical History:   Diagnosis Date     BRCA1 positive      Past Surgical History:   Procedure Laterality Date     BREAST LUMPECTOMY, RT/LT Left 2012    fibroadenoma     Family History   Problem Relation Age of Onset     Breast Cancer Mother 42      "Hereditary Breast and Ovarian Cancer Syndrome Mother      BRCA1+     Breast Cancer Maternal Grandmother 27     Social History     Social History     Marital status:      Spouse name: Saurabh     Number of children: 0     Years of education: N/A     Occupational History           Social History Main Topics     Smoking status: Never Smoker     Smokeless tobacco: Never Used     Alcohol use Yes      Comment: maybe 1/2 to 1 per week     Drug use: No     Sexual activity: Yes     Partners: Male     Birth control/ protection: Condom     Other Topics Concern     None     Social History Narrative     No current outpatient prescriptions on file.   No Known Allergies    Past medical, surgical, social and family history were reviewed and updated in EPIC.    EXAM:  /62  Pulse 85  Ht 5' 6\" (1.676 m)  Wt 117 lb 6.4 oz (53.3 kg)  SpO2 99%  BMI 18.95 kg/m2   BMI: Body mass index is 18.95 kg/(m^2).  Constitutional: healthy, alert and no distress  Head: Normocephalic. No masses, lesions, tenderness or abnormalities  Neck: Neck supple. Trachea midline. No adenopathy. Thyroid symmetric, normal size.   Cardiovascular: RRR.   Respiratory: Negative.   Breast: Breasts reveal mild symmetric fibrocystic densities, but there are no dominant, discrete, fixed or suspicious masses found.  Gastrointestinal: Abdomen soft, non-tender, non-distended. No masses, organomegaly.  :  Vulva:  No external lesions, normal female hair distribution, no inguinal adenopathy.    Urethra:  Midline, non-tender, well supported, no discharge  Vagina:  Moist, pink, no abnormal discharge, no lesions  Uterus:  Normal size , non-tender, freely mobile  Ovaries:  No masses appreciated, non-tender, mobile  Rectal Exam: deferred  Musculoskeletal: extremities normal  Skin: no suspicious lesions or rashes  Psychiatric: Affect appropriate, cooperative,mentation appears normal.     COUNSELING:   Reviewed preventive health counseling, as reflected " in patient instructions       Contraception       Family planning   reports that she has never smoked. She has never used smokeless tobacco.    Body mass index is 18.95 kg/(m^2).    FRAX Risk Assessment    ASSESSMENT:  27 year old female with satisfactory annual exam  (Z01.419) Encounter for gynecological examination without abnormal finding  (primary encounter diagnosis)  Comment: NFP  Plan: CBC with platelets        Preconceptual counseling done today, recc start multivitamin or prenatal vitamin daily when getting ready for pregnancy    (Z15.01,  Z15.02) BRCA1 positive  Plan: , US Pelvic Complete with Transvaginal,         CANCER RISK MGMT/CANCER GENETIC COUNSELING         REFERRAL        Check  and u/s for ovaries, but feels normal on exam. Will have her meet with genetics as they have been managing my last few pts.    (Z13.220) Screening for lipoid disorders  Comment: not fasting  Plan: Lipid Profile        Check today, not done prior    (Z12.4) Pap smear for cervical cancer screening  Plan: Pap imaged thin layer screen reflex to HPV if         ASCUS - recommend age 25 - 29        Discussed q3 yr pap and no increased risk of cervical cancer with BRCA        Cherrie Wadsworth MD

## 2018-03-12 NOTE — Clinical Note
Please abstract the following data from this visit with this patient into the appropriate field in Epic:  Pap smear done on this date: 2years ago, pt. Doesn't remember when (approximately), by this group: [facility], results were Normal.

## 2018-03-12 NOTE — MR AVS SNAPSHOT
After Visit Summary   3/12/2018    Ilda Hernandez    MRN: [id number]           Patient Information     Date Of Birth          1990        Visit Information        Provider Department      3/12/2018 4:30 PM Cherrie Wadsworth MD [facility]        Today's Diagnoses     Encounter for gynecological examination without abnormal finding    -  1    BRCA1 positive        Screening for lipoid disorders        Pap smear for cervical cancer screening           Follow-ups after your visit        Additional Services     CANCER RISK MGMT/CANCER GENETIC COUNSELING REFERRAL       Your provider has referred you to the Cancer Risk Management Program - Cancer Genetic Counseling.    Reason for Referral: brca type 1 positive (seems like you maureen have managed care for patients prior and she has not seen anyone since diagnosis in [place]    We have a sent a notice to a staff member of the Cancer Risk Management Program to give you a call to assist with scheduling your appointment.  You may also call  [phone] ([phone]) to initiate scheduling.    Please be aware that coverage of these services is subject to the terms and limitations of your health insurance plan.  Call member services at your health plan with any benefit or coverage questions.      Please bring the completed family history sheet to your appointment in addition to any available outside medical records documenting your cancer diagnosis.                  Your next 10 appointments already scheduled     Mar 30, 2018  9:00 AM CDT   (Arrive by 8:45 AM)   Return Visit with Jose Daniel Henderson MD   [facility] ([facility])    [address]  [address]  [facility] [address] [phone]            Jun 11, 2018   Procedure with Jose Daniel Henderson MD   [facility] [place], Same Day Surgery (--)    [address] [id number] [phone]              Future tests that were ordered  "for you today     Open Future Orders        Priority Expected Expires Ordered    US Pelvic Complete with Transvaginal Routine 6/10/2018 9/8/2018 3/12/2018            Who to contact     If you have questions or need follow up information about today's clinic visit or your schedule please contact [facility] directly at [phone].  Normal or non-critical lab and imaging results will be communicated to you by MyChart, letter or phone within 4 business days after the clinic has received the results. If you do not hear from us within 7 days, please contact the clinic through Gratcihart or phone. If you have a critical or abnormal lab result, we will notify you by phone as soon as possible.  Submit refill requests through incuBET or call your pharmacy and they will forward the refill request to us. Please allow 3 business days for your refill to be completed.          Additional Information About Your Visit        Gratcihart Information     incuBET gives you secure access to your electronic health record. If you see a primary care provider, you can also send messages to your care team and make appointments. If you have questions, please call your primary care clinic.  If you do not have a primary care provider, please call [phone] and they will assist you.        Care EveryWhere ID     This is your Care EveryWhere ID. This could be used by other organizations to access your [facility] medical records  [id number]        Your Vitals Were     Pulse Height Last Period Pulse Oximetry BMI (Body Mass Index)       85 5' 6\" (1.676 m) 02/23/2018 99% 18.95 kg/m2        Blood Pressure from Last 3 Encounters:   03/12/18 106/62   02/20/18 99/59   11/09/17 104/68    Weight from Last 3 Encounters:   03/12/18 117 lb 6.4 oz (53.3 kg)   02/20/18 119 lb 14.4 oz (54.4 kg)   11/09/17 117 lb (53.1 kg)              We Performed the Following          CANCER RISK MGMT/CANCER GENETIC COUNSELING REFERRAL     CBC with " platelets     Lipid Profile     Pap imaged thin layer screen reflex to HPV if ASCUS - recommend age 25 - 29        Primary Care Provider Fax #    Physician No Ref-Primary [phone]       No address on file        Equal Access to Services     CHETAN HERNANDEZ : Hadii aad ku haddamiándamien Bautista, billy koltonluisa, yimi vieira, radha hannain hayaabonita vernon gracecarla perez. So [facility] [phone].    ATENCIÓN: Si habla español, tiene a reilly disposición servicios gratuitos de asistencia lingüística. Llame al [phone].    We comply with applicable federal civil rights laws and [place] laws. We do not discriminate on the basis of race, color, national origin, age, disability, sex, sexual orientation, or gender identity.            Thank you!     Thank you for choosing [facility]  for your care. Our goal is always to provide you with excellent care. Hearing back from our patients is one way we can continue to improve our services. Please take a few minutes to complete the written survey that you may receive in the mail after your visit with us. Thank you!             Your Updated Medication List - Protect others around you: Learn how to safely use, store and throw away your medicines at www.disposemymeds.org.      Notice  As of 3/12/2018  4:34 PM    You have not been prescribed any medications.

## 2018-03-12 NOTE — NURSING NOTE
"Chief Complaint   Patient presents with     Physical     pap NIL 2yrs at [facility]       Initial /62  Pulse 85  Ht 5' 6\" (1.676 m)  Wt 117 lb 6.4 oz (53.3 kg)  LMP 2018  SpO2 99%  BMI 18.95 kg/m2 Estimated body mass index is 18.95 kg/(m^2) as calculated from the following:    Height as of this encounter: 5' 6\" (1.676 m).    Weight as of this encounter: 117 lb 6.4 oz (53.3 kg).  BP completed using cuff size: regular        The following HM Due: pap smear      The following patient reported/Care Every where data was sent to:  P ABSTRACT QUALITY INITIATIVES [30060]  Pap smear done on this date: 2 yrs ago, pt. Doesn't remember exactly when (approximately), by this group: [facility], results were normal.       patient has appointment for today and orders have been placed              "

## 2018-03-13 LAB
CANCER AG125 SERPL-ACNC: 17 U/ML (ref 0–30)
CHOLEST SERPL-MCNC: 134 MG/DL
HDLC SERPL-MCNC: 58 MG/DL
LDLC SERPL CALC-MCNC: 70 MG/DL
NONHDLC SERPL-MCNC: 76 MG/DL
TRIGL SERPL-MCNC: 32 MG/DL

## 2018-03-15 LAB
COPATH REPORT: NORMAL
PAP: NORMAL

## 2018-03-29 ENCOUNTER — RADIANT APPOINTMENT (OUTPATIENT)
Dept: ULTRASOUND IMAGING | Facility: CLINIC | Age: 28
End: 2018-03-29
Attending: OBSTETRICS & GYNECOLOGY
Payer: COMMERCIAL

## 2018-03-29 DIAGNOSIS — Z15.09 BRCA1 POSITIVE: ICD-10-CM

## 2018-03-29 DIAGNOSIS — Z15.01 BRCA1 POSITIVE: ICD-10-CM

## 2018-03-29 PROCEDURE — 76830 TRANSVAGINAL US NON-OB: CPT | Performed by: OBSTETRICS & GYNECOLOGY

## 2018-03-30 ENCOUNTER — ONCOLOGY VISIT (OUTPATIENT)
Dept: ONCOLOGY | Facility: CLINIC | Age: 28
End: 2018-03-30
Attending: SURGERY
Payer: COMMERCIAL

## 2018-03-30 VITALS
HEART RATE: 64 BPM | DIASTOLIC BLOOD PRESSURE: 80 MMHG | BODY MASS INDEX: 19.24 KG/M2 | OXYGEN SATURATION: 100 % | TEMPERATURE: 97.3 F | RESPIRATION RATE: 16 BRPM | WEIGHT: 119.7 LBS | SYSTOLIC BLOOD PRESSURE: 105 MMHG | HEIGHT: 66 IN

## 2018-03-30 DIAGNOSIS — Z15.01 BRCA1 POSITIVE: Primary | ICD-10-CM

## 2018-03-30 DIAGNOSIS — Z15.09 BRCA1 POSITIVE: Primary | ICD-10-CM

## 2018-03-30 PROCEDURE — G0463 HOSPITAL OUTPT CLINIC VISIT: HCPCS | Mod: ZF

## 2018-03-30 ASSESSMENT — PAIN SCALES - GENERAL: PAINLEVEL: NO PAIN (0)

## 2018-03-30 NOTE — NURSING NOTE
"Oncology Rooming Note    March 30, 2018 8:56 AM   Ilda Hernandez is a 27 year old female who presents for:    Chief Complaint   Patient presents with     Oncology Clinic Visit     Rturn: Breast ca      Initial Vitals: /80 (BP Location: Right arm, Patient Position: Chair, Cuff Size: Adult Regular)  Pulse 64  Temp 97.3  F (36.3  C) (Oral)  Resp 16  Ht 1.676 m (5' 5.98\")  Wt 54.3 kg (119 lb 11.2 oz)  LMP 03/21/2018  SpO2 100%  BMI 19.33 kg/m2 Estimated body mass index is 19.33 kg/(m^2) as calculated from the following:    Height as of this encounter: 1.676 m (5' 5.98\").    Weight as of this encounter: 54.3 kg (119 lb 11.2 oz). Body surface area is 1.59 meters squared.  No Pain (0) Comment: Data Unavailable   Patient's last menstrual period was 03/21/2018.  Allergies reviewed: YES  Medications reviewed: YES    Medications: Medication refills not needed today.  Pharmacy name entered into EPIC: Data Unavailable    Clinical concerns: no new concerns.  Pt received flu shot elsewhere. See Immunizations     6 minutes for nursing intake (face to face time)     Michelle Tolentino CMA                "

## 2018-03-30 NOTE — MR AVS SNAPSHOT
After Visit Summary   3/30/2018    Ilda Hernandez    MRN: [id number]           Patient Information     Date Of Birth          1990        Visit Information        Provider Department      3/30/2018 9:00 AM Jose Daniel Henderson MD [facility]        Today's Diagnoses     BRCA1 positive    -  1       Follow-ups after your visit        Your next 10 appointments already scheduled     Jun 11, 2018   Procedure with Jose Daniel Henderson MD   [facility], [place], Same Day Surgery (--)    [address]  [place] [id number] [phone]              Who to contact     If you have questions or need follow up information about today's clinic visit or your schedule please contact [facility] directly at [phone].  Normal or non-critical lab and imaging results will be communicated to you by MemBlazehart, letter or phone within 4 business days after the clinic has received the results. If you do not hear from us within 7 days, please contact the clinic through MemBlazehart or phone. If you have a critical or abnormal lab result, we will notify you by phone as soon as possible.  Submit refill requests through [email or web address] or call your pharmacy and they will forward the refill request to us. Please allow 3 business days for your refill to be completed.          Additional Information About Your Visit        MemBlazehart Information     [email or web address] gives you secure access to your electronic health record. If you see a primary care provider, you can also send messages to your care team and make appointments. If you have questions, please call your primary care clinic.  If you do not have a primary care provider, please call [phone] and they will assist you.        Care EveryWhere ID     This is your Care EveryWhere ID. This could be used by other organizations to access your [place] medical records  [id number]        Your Vitals Were     Pulse Temperature Respirations Height Last Period Pulse Oximetry    64 97.3  F  "(36.3  C) (Oral) 16 1.676 m (5' 5.98\") 03/21/2018 100%    BMI (Body Mass Index)                   19.33 kg/m2            Blood Pressure from Last 3 Encounters:   03/30/18 105/80   03/12/18 106/62   02/20/18 99/59    Weight from Last 3 Encounters:   03/30/18 54.3 kg (119 lb 11.2 oz)   03/12/18 53.3 kg (117 lb 6.4 oz)   02/20/18 54.4 kg (119 lb 14.4 oz)              Today, you had the following     No orders found for display       Primary Care Provider Fax #    Physician No Ref-Primary [phone]       No address on file        Equal Access to Services     CHETAN HERNANDEZ : Deepika Bautista, billy conde, yimi kaalmaemerson vieira, radha gonzalez . So [facility] [phone].    ATENCIÓN: Si habla español, tiene a reilly disposición servicios gratuitos de asistencia lingüística. Llame al [phone].    We comply with applicable federal civil rights laws and [place] laws. We do not discriminate on the basis of race, color, national origin, age, disability, sex, sexual orientation, or gender identity.            Thank you!     Thank you for choosing [facility]  for your care. Our goal is always to provide you with excellent care. Hearing back from our patients is one way we can continue to improve our services. Please take a few minutes to complete the written survey that you may receive in the mail after your visit with us. Thank you!             Your Updated Medication List - Protect others around you: Learn how to safely use, store and throw away your medicines at www.disposemymeds.org.      Notice  As of 3/30/2018 11:59 PM    You have not been prescribed any medications.      "

## 2018-03-30 NOTE — PROGRESS NOTES
HISTORY OF PRESENT ILLNESS:  Ilda Hernandez is here for a followup visit.  She has a known BRCA mutation and is scheduled to undergo bilateral mastectomies with immediate reconstruction this summer.  Today, she wanted to come in just to talk about some more questions she had.  She specifically wanted to know about nipple preservation versus nipple sacrifice.  I talked to her about the expected outcomes for nipple preservation which would include improved cosmetic outcomes but the nipple to be insensate and not erectile.  She would not be able to breast feed.  I did talk to her about the exceedingly low risk of developing a cancer in her nipple areolar complex.  We also talked about the expected length of stay, recovery.  All of her questions were asked and answered.  She is tentatively scheduled for 6/11 with Dr. Grace to perform tissue expanders.      TT:  30 minutes.  CT:  30 minutes.

## 2018-04-06 NOTE — PROGRESS NOTES
SUBJECTIVE:   Ilda Hernandez is a 27 year old female who presents to clinic today for the following health issues:    New Patient/Transfer of Care    This is an otherwise healthy 27-year-old female with a history of positive brachial gene and a strong family history of breast cancer here to establish care.  Patient is scheduled for bilateral mastectomy for prophylactic treatment.  No related acute complaints today.    Genetic Testing:  Reports BRCA1+, records requested from [facility] (under the name of Ilda Redman).     Pertinent history:  January 2012 - Breast biopsy for lump in L breast, pathology showed fibroadenoma.     Pertinent screening history:  8/17/2014 - Breast MRI ([facility]), BiRads3, stable 12 o'clock area of R breast, dense breasts noted.  12/22/2014 - B Ultrasound, BiRads2  8/18/2015 - Breast MRI, stable, BiRads1  6/23/2016 - Breast MRI and B ultrasound, negative ([facility])  6/20/2017 - Breast MRI ([facility]), heterogeneously dense breasts noted; focal area of non mass enhancement in extreme lower slightly outer area of R breast; targeted US recommended.    Future  6/11/2018 - Double mastectomy, nipple sparing scheduled     Hair Loss      Duration: gradual over the last year    Description (location/character/radiation): crown of head where part is (middle)    Intensity:  mild    Accompanying signs and symptoms: none    History (similar episodes/previous evaluation): None    Precipitating or alleviating factors: None    Therapies tried and outcome: Hair, skin and nails vitamin just started       PROBLEMS TO ADD ON:  Hair loss - feels like her hair is thinning.  Specifically reports that the part and her hair appears to be extending posteriorly.  No constitutional signs or symptoms including unintended weight loss fevers Shira appetite changes nausea vomiting.  Reports normal life stressors but no increased stress.    Problem list and histories  reviewed & adjusted, as indicated.  Additional history: as documented    Patient Active Problem List   Diagnosis     BRCA1 positive     Past Surgical History:   Procedure Laterality Date     BREAST LUMPECTOMY, RT/LT Left 01/2012    fibroadenoma       Social History   Substance Use Topics     Smoking status: Never Smoker     Smokeless tobacco: Never Used     Alcohol use Yes      Comment: maybe 1/2 to 1 per week     Family History   Problem Relation Age of Onset     Breast Cancer Mother 42     Hereditary Breast and Ovarian Cancer Syndrome Mother      BRCA1+     Breast Cancer Maternal Grandmother 27         Current Outpatient Prescriptions   Medication Sig Dispense Refill     Ascorbic Acid (VITAMIN C GUMMIE PO) Take 2 chew tab by mouth       UNABLE TO FIND 2 chew tab MEDICATION NAME: Naturemade hair, skin and nails.       No Known Allergies    Reviewed and updated as needed this visit by clinical staff       Reviewed and updated as needed this visit by Provider         ROS:  All other systems on a 10-point review are negative, unless otherwise noted in HPI      OBJECTIVE:     /58 (BP Location: Right arm, Patient Position: Chair, Cuff Size: Adult Regular)  Pulse 83  Temp 98.1  F (36.7  C) (Oral)  Wt 116 lb 9.6 oz (52.9 kg)  LMP 03/21/2018  SpO2 99%  BMI 18.83 kg/m2  Body mass index is 18.83 kg/(m^2).  GENERAL: healthy, alert and no distress  EYES: Eyes grossly normal to inspection, PERRL and conjunctivae and sclerae normal  HENT: ear canals and TM's normal, nose and mouth without ulcers or lesions  NECK: no adenopathy, no asymmetry, masses, or scars and thyroid normal to palpation  RESP: lungs clear to auscultation - no rales, rhonchi or wheezes  CV: regular rate and rhythm, normal S1 S2, no S3 or S4, no murmur, click or rub, no peripheral edema and peripheral pulses strong  ABDOMEN: soft, nontender, no hepatosplenomegaly, no masses and bowel sounds normal  MS: no gross musculoskeletal defects noted, no  edema  SKIN: no suspicious lesions or rashes  NEURO: Normal strength and tone, mentation intact and speech normal  PSYCH: mentation appears normal, affect normal/bright  Hair: no patchy hair loss.  No obvious decrease in density of hair follicles in the area of concern on patient's scalp.    Diagnostic Test Results:  none     ASSESSMENT/PLAN:       1. Establishing care with new doctor, encounter for  - **Hemoglobin FUTURE anytime; Future    2. BRCA1 positive  Patient will schedule preop within 7 days of scheduled bilateral mastectomy in June 2018.    3. Hair loss  No impressive hair loss on exam today.  Counseled patient on differential etiologies and possible workup if the problem progresses.  Anticipatory guidance regarding observation and need for referral to dermatology for further management.  At this time the patient is comfortable observing.    4. Need for Tdap vaccination  - TDAP VACCINE (ADACEL)    See Patient Instructions    Wilder Liao MD  [facility]

## 2018-04-09 ENCOUNTER — OFFICE VISIT (OUTPATIENT)
Dept: PEDIATRICS | Facility: CLINIC | Age: 28
End: 2018-04-09
Payer: COMMERCIAL

## 2018-04-09 VITALS
WEIGHT: 116.6 LBS | BODY MASS INDEX: 18.83 KG/M2 | TEMPERATURE: 98.1 F | HEART RATE: 83 BPM | OXYGEN SATURATION: 99 % | SYSTOLIC BLOOD PRESSURE: 102 MMHG | DIASTOLIC BLOOD PRESSURE: 58 MMHG

## 2018-04-09 DIAGNOSIS — Z76.89 ESTABLISHING CARE WITH NEW DOCTOR, ENCOUNTER FOR: Primary | ICD-10-CM

## 2018-04-09 DIAGNOSIS — Z15.01 BRCA1 POSITIVE: ICD-10-CM

## 2018-04-09 DIAGNOSIS — L65.9 HAIR LOSS: ICD-10-CM

## 2018-04-09 DIAGNOSIS — Z23 NEED FOR TDAP VACCINATION: ICD-10-CM

## 2018-04-09 DIAGNOSIS — Z15.09 BRCA1 POSITIVE: ICD-10-CM

## 2018-04-09 PROCEDURE — 90715 TDAP VACCINE 7 YRS/> IM: CPT | Performed by: INTERNAL MEDICINE

## 2018-04-09 PROCEDURE — 99213 OFFICE O/P EST LOW 20 MIN: CPT | Performed by: INTERNAL MEDICINE

## 2018-04-09 NOTE — MR AVS SNAPSHOT
After Visit Summary   4/9/2018    Ilda Hernandez    MRN: [id number]           Patient Information     Date Of Birth          1990        Visit Information        Provider Department      4/9/2018 5:30 PM Lisa Liao Mai, MD [facility]        Today's Diagnoses     Need for Tdap vaccination    -  1    BRCA1 positive          Care Instructions    No signs of hair thinning or hair loss.  Observe for now.  If hair thinning develops, we will send a few labs to rule out systemic causes, then send to dermatology.    Start taking maintenance doses of vitamin D (800 - 2000 units daily).    Prevention Guidelines, Women Ages 18 to 39  Screening tests and vaccines are an important part of managing your health. Health counseling is essential, too. Below are guidelines for these, for women ages 18 to 39. Talk with your healthcare provider to make sure you re up-to-date on what you need.  Screening Who needs it How often   Alcohol misuse All women in this age group At routine exams   Blood pressure All women in this age group Every 2 years if your blood pressure is less than 120/80 mm Hg; yearly if your systolic blood pressure is 120 to 139 mm Hg, or your diastolic blood pressure reading is 80 to 89 mm Hg   Breast cancer All women in this age group should talk with their healthcare providers about the need for clinical breast exams (CBE)1 Clinical breast exam every 3 years1   Cervical cancer Women ages 21 and older Women between ages 21 and 29 should have a Pap test every 3 years; women between ages 30 and 65 are advised to have a Pap test plus an HPV test every 5 years   Chlamydia Sexually active women ages 24 and younger, and women at increased risk for infection Every 3 years if you're at risk or have symptoms   Depression All women in this age group At routine exams   Diabetes mellitus, type 2 Adults with no symptoms who are overweight or obese and have 1 or more other risk factors for diabetes  At least every 3 years   Gonorrhea Sexually active women at increased risk for infection At routine exams   Hepatitis C Anyone at increased risk At routine exams   HIV All women At routine exams   Obesity All women in this age group At routine exams   Syphilis Women at increased risk for infection - talk with your healthcare provider At routine exams   Tuberculosis Women at increased risk for infection - talk with your healthcare provider Ask your healthcare provider   Vision All women in this age group At least 1 complete exam in your 20s, and 2 in your 30s   Vaccine2 Who needs it How often   Chickenpox (varicella) All women in this age group who have no record of this infection or vaccine 2 doses; the second dose should be given 4 to 8 weeks after the first dose   Hepatitis A Women at increased risk for infection - talk with your healthcare provider 2 doses given at least 6 months apart   Hepatitis B Women at increased risk for infection - talk with your healthcare provider 3 doses over 6 months; second dose should be given 1 month after the first dose; the third dose should be given at least 2 months after the second dose and at least 4 months after the first dose   Haemophilus influenzae Type B (HIB) Women at increased risk for infection - talk with your healthcare provider 1 to 3 doses   Human papillomavirus (HPV) All women in this age group up to age 26 3 doses; the second dose should be given 1 to 2 months after the first dose and the third dose given 6 months after the first dose   Influenza (flu) All women in this age group Once a year   Measles, mumps, rubella (MMR) All women in this age group who have no record of these infections or vaccines 1 or 2 doses   Meningococcal Women at increased risk for infection - talk with your healthcare provider 1 or more doses   Pneumococcal conjugate vaccine (PCV13) and pneumococcal polysaccharide vaccine (PPSV23) Women at increased risk for infection - talk with your  healthcare provider PCV13: 1 dose ages 19 to 65 (protects against 13 types of pneumococcal bacteria)  PPSV23: 1 to 2 doses through age 64, or 1 dose at 65 or older (protects against 23 types of pneumococcal bacteria)      Tetanus/diphtheria/pertussis (Td/Tdap) booster All women in this age group Td every 10 years, or a one-time dose of Tdap instead of a Td booster after age 18, then Td every 10 years   Counseling Who needs it How often   BRCA gene mutation testing for breast and ovarian cancer susceptibility Women with increased risk for having gene mutation When your risk is known   Breast cancer and chemoprevention Women at high risk for breast cancer When your risk is known   Diet and exercise Women who are overweight or obese When diagnosed, and then at routine exams   Domestic violence Women at the age in which they are able to have children At routine exams   Sexually transmitted infection prevention Women who are sexually active At routine exams   Skin cancer Prevention of skin cancer in fair-skinned adults through age 24 At routine exams   Use of tobacco and the health effects it can cause All women in this age group Every visit   1According to the ACS, women ages 20 to 39 years should have a clinical breast exam (CBE) as part of their routine health exam every 3 years. Breast self-exams are an option for women starting in their 20s. But the  USPSTF does not recommend CBE.  2Those who are 18 years old and not up-to-date on their childhood vaccines should get all appropriate catch-up vaccines recommended by the CDC.  Date Last Reviewed: 8/27/2015 2000-2017 [employer]. [address] [place] [address], [place] [address]. All rights reserved. This information is not intended as a substitute for professional medical care. Always follow your healthcare professional's instructions.                Follow-ups after your visit        Your next 10 appointments already scheduled     Jun 11, 2018   Procedure  with Jose Daniel Henderson MD   [facility] [place], Same Day Surgery (--)    [address]  [place] [address] [phone]              Who to contact     If you have questions or need follow up information about today's clinic visit or your schedule please contact [facility] directly at [phone].  Normal or non-critical lab and imaging results will be communicated to you by MyChart, letter or phone within 4 business days after the clinic has received the results. If you do not hear from us within 7 days, please contact the clinic through TripMarkhart or phone. If you have a critical or abnormal lab result, we will notify you by phone as soon as possible.  Submit refill requests through [employer] or call your pharmacy and they will forward the refill request to us. Please allow 3 business days for your refill to be completed.          Additional Information About Your Visit        TripMarkharIon Beam Services Information     [employer] gives you secure access to your electronic health record. If you see a primary care provider, you can also send messages to your care team and make appointments. If you have questions, please call your primary care clinic.  If you do not have a primary care provider, please call [phone] and they will assist you.        Care EveryWhere ID     This is your Care EveryWhere ID. This could be used by other organizations to access your [place] medical records  [id number]        Your Vitals Were     Pulse Temperature Last Period Pulse Oximetry BMI (Body Mass Index)       83 98.1  F (36.7  C) (Oral) 03/21/2018 99% 18.83 kg/m2        Blood Pressure from Last 3 Encounters:   04/09/18 102/58   03/30/18 105/80   03/12/18 106/62    Weight from Last 3 Encounters:   04/09/18 116 lb 9.6 oz (52.9 kg)   03/30/18 119 lb 11.2 oz (54.3 kg)   03/12/18 117 lb 6.4 oz (53.3 kg)              Today, you had the following     No orders found for display       Primary Care Provider Office Phone # Fax #    Lisa Liao MD  [phone] [address] [place] [address]        Equal Access to Services     [facility] MARY : Hadii aad ku haddamiándamien Lily, waheidida luqadaha, qaybta kaalmada tkrosario, radha hannain hayaabonita freyelle wolf laToddbrad perez. So [facility] [phone].    ATENCIÓN: Si habla español, tiene a reilly disposición servicios gratuitos de asistencia lingüística. Llame al [phone].    We comply with applicable federal civil rights laws and [place] laws. We do not discriminate on the basis of race, color, national origin, age, disability, sex, sexual orientation, or gender identity.            Thank you!     Thank you for choosing [facility]  for your care. Our goal is always to provide you with excellent care. Hearing back from our patients is one way we can continue to improve our services. Please take a few minutes to complete the written survey that you may receive in the mail after your visit with us. Thank you!             Your Updated Medication List - Protect others around you: Learn how to safely use, store and throw away your medicines at www.disposemymeds.org.          This list is accurate as of 4/9/18  6:17 PM.  Always use your most recent med list.                   Brand Name Dispense Instructions for use Diagnosis    UNABLE TO FIND      2 chew tab MEDICATION NAME: Naturemade hair, skin and nails.        VITAMIN C GUMMIE PO      Take 2 chew tab by mouth

## 2018-04-09 NOTE — PATIENT INSTRUCTIONS
No signs of hair thinning or hair loss.  Observe for now.  If hair thinning develops, we will send a few labs to rule out systemic causes, then send to dermatology.    Start taking maintenance doses of vitamin D (800 - 2000 units daily).    Prevention Guidelines, Women Ages 18 to 39  Screening tests and vaccines are an important part of managing your health. Health counseling is essential, too. Below are guidelines for these, for women ages 18 to 39. Talk with your healthcare provider to make sure you re up-to-date on what you need.  Screening Who needs it How often   Alcohol misuse All women in this age group At routine exams   Blood pressure All women in this age group Every 2 years if your blood pressure is less than 120/80 mm Hg; yearly if your systolic blood pressure is 120 to 139 mm Hg, or your diastolic blood pressure reading is 80 to 89 mm Hg   Breast cancer All women in this age group should talk with their healthcare providers about the need for clinical breast exams (CBE)1 Clinical breast exam every 3 years1   Cervical cancer Women ages 21 and older Women between ages 21 and 29 should have a Pap test every 3 years; women between ages 30 and 65 are advised to have a Pap test plus an HPV test every 5 years   Chlamydia Sexually active women ages 24 and younger, and women at increased risk for infection Every 3 years if you're at risk or have symptoms   Depression All women in this age group At routine exams   Diabetes mellitus, type 2 Adults with no symptoms who are overweight or obese and have 1 or more other risk factors for diabetes At least every 3 years   Gonorrhea Sexually active women at increased risk for infection At routine exams   Hepatitis C Anyone at increased risk At routine exams   HIV All women At routine exams   Obesity All women in this age group At routine exams   Syphilis Women at increased risk for infection - talk with your healthcare provider At routine exams   Tuberculosis Women at  increased risk for infection - talk with your healthcare provider Ask your healthcare provider   Vision All women in this age group At least 1 complete exam in your 20s, and 2 in your 30s   Vaccine2 Who needs it How often   Chickenpox (varicella) All women in this age group who have no record of this infection or vaccine 2 doses; the second dose should be given 4 to 8 weeks after the first dose   Hepatitis A Women at increased risk for infection - talk with your healthcare provider 2 doses given at least 6 months apart   Hepatitis B Women at increased risk for infection - talk with your healthcare provider 3 doses over 6 months; second dose should be given 1 month after the first dose; the third dose should be given at least 2 months after the second dose and at least 4 months after the first dose   Haemophilus influenzae Type B (HIB) Women at increased risk for infection - talk with your healthcare provider 1 to 3 doses   Human papillomavirus (HPV) All women in this age group up to age 26 3 doses; the second dose should be given 1 to 2 months after the first dose and the third dose given 6 months after the first dose   Influenza (flu) All women in this age group Once a year   Measles, mumps, rubella (MMR) All women in this age group who have no record of these infections or vaccines 1 or 2 doses   Meningococcal Women at increased risk for infection - talk with your healthcare provider 1 or more doses   Pneumococcal conjugate vaccine (PCV13) and pneumococcal polysaccharide vaccine (PPSV23) Women at increased risk for infection - talk with your healthcare provider PCV13: 1 dose ages 19 to 65 (protects against 13 types of pneumococcal bacteria)  PPSV23: 1 to 2 doses through age 64, or 1 dose at 65 or older (protects against 23 types of pneumococcal bacteria)      Tetanus/diphtheria/pertussis (Td/Tdap) booster All women in this age group Td every 10 years, or a one-time dose of Tdap instead of a Td booster after age  18, then Td every 10 years   Counseling Who needs it How often   BRCA gene mutation testing for breast and ovarian cancer susceptibility Women with increased risk for having gene mutation When your risk is known   Breast cancer and chemoprevention Women at high risk for breast cancer When your risk is known   Diet and exercise Women who are overweight or obese When diagnosed, and then at routine exams   Domestic violence Women at the age in which they are able to have children At routine exams   Sexually transmitted infection prevention Women who are sexually active At routine exams   Skin cancer Prevention of skin cancer in fair-skinned adults through age 24 At routine exams   Use of tobacco and the health effects it can cause All women in this age group Every visit   1According to the ACS, women ages 20 to 39 years should have a clinical breast exam (CBE) as part of their routine health exam every 3 years. Breast self-exams are an option for women starting in their 20s. But the  USPSTF does not recommend CBE.  2Those who are 18 years old and not up-to-date on their childhood vaccines should get all appropriate catch-up vaccines recommended by the CDC.  Date Last Reviewed: 8/27/2015 2000-2017 [employer]. [address] [place] [address]. All rights reserved. This information is not intended as a substitute for professional medical care. Always follow your healthcare professional's instructions.

## 2018-04-30 DIAGNOSIS — Z15.01 BRCA1 POSITIVE: Primary | ICD-10-CM

## 2018-04-30 DIAGNOSIS — Z15.09 BRCA1 POSITIVE: Primary | ICD-10-CM

## 2018-04-30 RX ORDER — CEFAZOLIN SODIUM 1 G/50ML
1 INJECTION, SOLUTION INTRAVENOUS SEE ADMIN INSTRUCTIONS
Status: CANCELLED | OUTPATIENT
Start: 2018-04-30

## 2018-06-01 NOTE — PROGRESS NOTES
[facility]  [address]  [address]  [facility] [address] [phone]  Dept: [phone]    PRE-OP EVALUATION:  Today's date: 2018    Ilda Hernandez (: 1990) presents for pre-operative evaluation assessment as requested by Dr. Henderson.  She requires evaluation and anesthesia risk assessment prior to undergoing surgery/procedure for treatment of Mastectomy, nipple sparing .    Fax number for surgical facility: [phone]  Primary Physician: Lisa Liao Mai  Type of Anesthesia Anticipated: General    Patient has a Health Care Directive or Living Will:  NO    Preop Questions 2018   Who is doing your surgery? Jose Daniel Henderson and Mahamed Grace   What are you having done? Double Mastectomy and expander reconstruction   Date of Surgery/Procedure: 2018   Facility or Hospital where procedure/surgery will be performed: [facility]   1.  Do you have a history of Heart attack, stroke, stent, coronary bypass surgery, or other heart surgery? No   2.  Do you ever have any pain or discomfort in your chest? No   3.  Do you have a history of  Heart Failure? No   4.   Are you troubled by shortness of breath when:  walking on a level surface, or up a slight hill, or at night? No   5.  Do you currently have a cold, bronchitis or other respiratory infection? No   6.  Do you have a cough, shortness of breath, or wheezing? No   7.  Do you sometimes get pains in the calves of your legs when you walk? No   8. Do you or anyone in your family have previous history of blood clots? No   9.  Do you or does anyone in your family have a serious bleeding problem such as prolonged bleeding following surgeries or cuts? No   10. Have you ever had problems with anemia or been told to take iron pills? No   11. Have you had any abnormal blood loss such as black, tarry or bloody stools, or abnormal vaginal bleeding? No   12. Have you ever had a blood transfusion?  No   13. Have you or any of your relatives ever had problems with anesthesia? No   14. Do you have sleep apnea, excessive snoring or daytime drowsiness? No   15. Do you have any prosthetic heart valves? No   16. Do you have prosthetic joints? No   17. Is there any chance that you may be pregnant? No         HPI:     HPI related to upcoming procedure: BRCA 1 mutation      See problem list for active medical problems.  Problems all longstanding and stable, except as noted/documented.  See ROS for pertinent symptoms related to these conditions.                                                                                                                                                          .    MEDICAL HISTORY:     Patient Active Problem List    Diagnosis Date Noted     BRCA1 positive 01/01/2012     Priority: Medium     Genetic Testing:  Reports BRCA1+, records requested from [facility] (under the name of Ilda Redman).     Pertinent history:  January 2012 - Breast biopsy for lump in L breast, pathology showed fibroadenoma.     Pertinent screening history:  8/17/2014 - Breast MRI ([facility]), BiRads3, stable 12 o'clock area of R breast, dense breasts noted.  12/22/2014 - B Ultrasound, BiRads2  8/18/2015 - Breast MRI, stable, BiRads1  6/23/2016 - Breast MRI and B ultrasound, negative ([facility])  6/20/2017 - Breast MRI ([facility]), heterogeneously dense breasts noted; focal area of non mass enhancement in extreme lower slightly outer area of R breast; targeted US recommended.        Past Medical History:   Diagnosis Date     BRCA1 positive 11/23/2011    [id number], identified using site specific testing through [facility] cliic     Past Surgical History:   Procedure Laterality Date     BREAST LUMPECTOMY, RT/LT Left 01/2012    fibroadenoma     Current Outpatient Prescriptions   Medication Sig Dispense Refill     Ascorbic Acid (VITAMIN C GUMMIE PO) Take 2 chew tab by  "mouth       UNABLE TO FIND 2 chew tab MEDICATION NAME: Naturemade hair, skin and nails.       OTC products: no recent use of OTC ASA, NSAIDS or Steroids    No Known Allergies   Latex Allergy: NO    Social History   Substance Use Topics     Smoking status: Never Smoker     Smokeless tobacco: Never Used     Alcohol use Yes      Comment: maybe 1/2 to 1 per week     History   Drug Use No       REVIEW OF SYSTEMS:   Constitutional, neuro, ENT, endocrine, pulmonary, cardiac, gastrointestinal, genitourinary, musculoskeletal, integument and psychiatric systems are negative, except as otherwise noted.    EXAM:   BP 98/60 (BP Location: Right arm, Patient Position: Chair, Cuff Size: Adult Regular)  Pulse 86  Temp 98.2  F (36.8  C) (Oral)  Ht 5' 5.98\" (1.676 m)  Wt 113 lb 9.6 oz (51.5 kg)  SpO2 98%  BMI 18.34 kg/m2    GENERAL APPEARANCE: healthy, alert and no distress     EYES: EOMI, PERRL     HENT: ear canals and TM's normal and nose and mouth without ulcers or lesions     NECK: no adenopathy, no asymmetry, masses, or scars and thyroid normal to palpation     RESP: lungs clear to auscultation - no rales, rhonchi or wheezes     CV: regular rates and rhythm, normal S1 S2, no S3 or S4 and no murmur, click or rub     ABDOMEN:  soft, nontender, no HSM or masses and bowel sounds normal     MS: extremities normal- no gross deformities noted, no evidence of inflammation in joints, FROM in all extremities.     SKIN: no suspicious lesions or rashes     NEURO: Normal strength and tone, sensory exam grossly normal, mentation intact and speech normal     PSYCH: mentation appears normal. and affect normal/bright     LYMPHATICS: No cervical adenopathy      DIAGNOSTICS:   EKG: Not indicated due to non-vascular surgery and low risk of event (age <65 and without cardiac risk factors)    Recent Labs   Lab Test  03/12/18   1644   HGB  12.4   PLT  226        IMPRESSION:   Reason for surgery/procedure: BRCA 1 mutation/bilateral " mastectomy  Diagnosis/reason for consult: preoperative assessment and medical optimization.    The proposed surgical procedure is considered LOW risk.    REVISED CARDIAC RISK INDEX  The patient has the following serious cardiovascular risks for perioperative complications such as (MI, PE, VFib and 3  AV Block):  No serious cardiac risks  INTERPRETATION: 0 risks: Class I (very low risk - 0.4% complication rate)    The patient has the following additional risks for perioperative complications:  No identified additional risks      ICD-10-CM    1. Preop general physical exam Z01.818        RECOMMENDATIONS:         --Patient is on no chronic medications    APPROVAL GIVEN to proceed with proposed procedure, without further diagnostic evaluation       Signed Electronically by: Wilder Liao MD    Copy of this evaluation report is provided to requesting physician.    [facility] Preop Guidelines    Revised Cardiac Risk Index

## 2018-06-05 ENCOUNTER — OFFICE VISIT (OUTPATIENT)
Dept: PEDIATRICS | Facility: CLINIC | Age: 28
End: 2018-06-05
Payer: COMMERCIAL

## 2018-06-05 VITALS
HEIGHT: 66 IN | HEART RATE: 86 BPM | OXYGEN SATURATION: 98 % | BODY MASS INDEX: 18.26 KG/M2 | DIASTOLIC BLOOD PRESSURE: 60 MMHG | SYSTOLIC BLOOD PRESSURE: 98 MMHG | TEMPERATURE: 98.2 F | WEIGHT: 113.6 LBS

## 2018-06-05 DIAGNOSIS — Z01.818 PREOP GENERAL PHYSICAL EXAM: Primary | ICD-10-CM

## 2018-06-05 PROCEDURE — 99214 OFFICE O/P EST MOD 30 MIN: CPT | Performed by: INTERNAL MEDICINE

## 2018-06-05 NOTE — MR AVS SNAPSHOT
After Visit Summary   6/5/2018    Ilda Hernandez    MRN: [id number]           Patient Information     Date Of Birth          1990        Visit Information        Provider Department      6/5/2018 4:30 PM Lisa Liao Mai, MD [facility]        Today's Diagnoses     Preop general physical exam    -  1      Care Instructions      Before Your Surgery      Call your surgeon if there is any change in your health. This includes signs of a cold or flu (such as a sore throat, runny nose, cough, rash or fever).    Do not smoke, drink alcohol or take over the counter medicine (unless your surgeon or primary care doctor tells you to) for the 24 hours before and after surgery.    If you take prescribed drugs: Follow your doctor s orders about which medicines to take and which to stop until after surgery.    Eating and drinking prior to surgery: follow the instructions from your surgeon    Take a shower or bath the night before surgery. Use the soap your surgeon gave you to gently clean your skin. If you do not have soap from your surgeon, use your regular soap. Do not shave or scrub the surgery site.  Wear clean pajamas and have clean sheets on your bed.     Before Your Surgery      Call your surgeon if there is any change in your health. This includes signs of a cold or flu (such as a sore throat, runny nose, cough, rash or fever).    Do not smoke, drink alcohol or take over the counter medicine (unless your surgeon or primary care doctor tells you to) for the 24 hours before and after surgery.    If you take prescribed drugs: Follow your doctor s orders about which medicines to take and which to stop until after surgery.    Eating and drinking prior to surgery: follow the instructions from your surgeon    Take a shower or bath the night before surgery. Use the soap your surgeon gave you to gently clean your skin. If you do not have soap from your surgeon, use your regular soap. Do not shave or  "scrub the surgery site.  Wear clean pajamas and have clean sheets on your bed.           Follow-ups after your visit        Follow-up notes from your care team     Return in about 2 months (around 8/5/2018), or prior to next surgery for next preop.      Your next 10 appointments already scheduled     Jun 11, 2018   Procedure with Jose Daniel Henderson MD   [facility], [place], Same Day Surgery (--)    [address]  [place] [address] [phone]              Who to contact     If you have questions or need follow up information about today's clinic visit or your schedule please contact [facility] directly at [phone].  Normal or non-critical lab and imaging results will be communicated to you by MyChart, letter or phone within 4 business days after the clinic has received the results. If you do not hear from us within 7 days, please contact the clinic through Piggybackrt or phone. If you have a critical or abnormal lab result, we will notify you by phone as soon as possible.  Submit refill requests through videoNEXT or call your pharmacy and they will forward the refill request to us. Please allow 3 business days for your refill to be completed.          Additional Information About Your Visit        XbyMehart Information     videoNEXT gives you secure access to your electronic health record. If you see a primary care provider, you can also send messages to your care team and make appointments. If you have questions, please call your primary care clinic.  If you do not have a primary care provider, please call [phone] and they will assist you.        Care EveryWhere ID     This is your Care EveryWhere ID. This could be used by other organizations to access your [place] medical records  [id number]        Your Vitals Were     Pulse Temperature Height Pulse Oximetry BMI (Body Mass Index)       86 98.2  F (36.8  C) (Oral) 5' 5.98\" (1.676 m) 98% 18.34 kg/m2        Blood Pressure from Last 3 Encounters:   06/05/18 " 98/60   04/09/18 102/58   03/30/18 105/80    Weight from Last 3 Encounters:   06/05/18 113 lb 9.6 oz (51.5 kg)   04/09/18 116 lb 9.6 oz (52.9 kg)   03/30/18 119 lb 11.2 oz (54.3 kg)              Today, you had the following     No orders found for display       Primary Care Provider Office Phone # Fax #    Lisa Liao [phone] [address] [place] [address]        Equal Access to Services     [facility]: Hadii aad ku hadasho Soomaali, waaxda luqadaha, qaybta kaalmada isha, radha gonzalez . So [facility] [phone].    ATENCIÓN: Si habla español, tiene a reilly disposición servicios gratuitos de asistencia lingüística. Llame al [phone].    We comply with applicable federal civil rights laws and [place] laws. We do not discriminate on the basis of race, color, national origin, age, disability, sex, sexual orientation, or gender identity.            Thank you!     Thank you for choosing [facility]  for your care. Our goal is always to provide you with excellent care. Hearing back from our patients is one way we can continue to improve our services. Please take a few minutes to complete the written survey that you may receive in the mail after your visit with us. Thank you!             Your Updated Medication List - Protect others around you: Learn how to safely use, store and throw away your medicines at www.disposemymeds.org.          This list is accurate as of 6/5/18  4:36 PM.  Always use your most recent med list.                   Brand Name Dispense Instructions for use Diagnosis    UNABLE TO FIND      2 chew tab MEDICATION NAME: Naturemade hair, skin and nails.        VITAMIN C GUMMIE PO      Take 2 chew tab by mouth

## 2018-06-08 ENCOUNTER — ANESTHESIA EVENT (OUTPATIENT)
Dept: SURGERY | Facility: CLINIC | Age: 28
End: 2018-06-08
Payer: COMMERCIAL

## 2018-06-11 ENCOUNTER — ANESTHESIA (OUTPATIENT)
Dept: SURGERY | Facility: CLINIC | Age: 28
End: 2018-06-11
Payer: COMMERCIAL

## 2018-06-11 ENCOUNTER — HOSPITAL ENCOUNTER (OUTPATIENT)
Facility: CLINIC | Age: 28
Discharge: HOME | End: 2018-06-12
Attending: SURGERY | Admitting: SURGERY
Payer: COMMERCIAL

## 2018-06-11 DIAGNOSIS — Z98.890 POST-OPERATIVE NAUSEA AND VOMITING: ICD-10-CM

## 2018-06-11 DIAGNOSIS — Z15.01 BRCA1 POSITIVE: Primary | ICD-10-CM

## 2018-06-11 DIAGNOSIS — G89.18 ACUTE POST-OPERATIVE PAIN: ICD-10-CM

## 2018-06-11 DIAGNOSIS — R11.2 POST-OPERATIVE NAUSEA AND VOMITING: ICD-10-CM

## 2018-06-11 DIAGNOSIS — Z15.09 BRCA1 POSITIVE: Primary | ICD-10-CM

## 2018-06-11 LAB
GLUCOSE BLDC GLUCOMTR-MCNC: 90 MG/DL (ref 70–99)
HCG UR QL: NEGATIVE

## 2018-06-11 PROCEDURE — 25000128 H RX IP 250 OP 636: Performed by: PLASTIC SURGERY

## 2018-06-11 PROCEDURE — 25000125 ZZHC RX 250: Performed by: NURSE ANESTHETIST, CERTIFIED REGISTERED

## 2018-06-11 PROCEDURE — 37000008 ZZH ANESTHESIA TECHNICAL FEE, 1ST 30 MIN: Performed by: SURGERY

## 2018-06-11 PROCEDURE — 88307 TISSUE EXAM BY PATHOLOGIST: CPT | Performed by: SURGERY

## 2018-06-11 PROCEDURE — L8600 IMPLANT BREAST SILICONE/EQ: HCPCS | Performed by: SURGERY

## 2018-06-11 PROCEDURE — 81025 URINE PREGNANCY TEST: CPT | Performed by: ANESTHESIOLOGY

## 2018-06-11 PROCEDURE — 40000171 ZZH STATISTIC PRE-PROCEDURE ASSESSMENT III: Performed by: SURGERY

## 2018-06-11 PROCEDURE — 25000128 H RX IP 250 OP 636: Performed by: NURSE ANESTHETIST, CERTIFIED REGISTERED

## 2018-06-11 PROCEDURE — 36000057 ZZH SURGERY LEVEL 3 1ST 30 MIN - UMMC: Performed by: SURGERY

## 2018-06-11 PROCEDURE — 25000125 ZZHC RX 250: Performed by: ANESTHESIOLOGY

## 2018-06-11 PROCEDURE — 36000059 ZZH SURGERY LEVEL 3 EA 15 ADDTL MIN UMMC: Performed by: SURGERY

## 2018-06-11 PROCEDURE — 37000009 ZZH ANESTHESIA TECHNICAL FEE, EACH ADDTL 15 MIN: Performed by: SURGERY

## 2018-06-11 PROCEDURE — 25000132 ZZH RX MED GY IP 250 OP 250 PS 637: Performed by: STUDENT IN AN ORGANIZED HEALTH CARE EDUCATION/TRAINING PROGRAM

## 2018-06-11 PROCEDURE — C9290 INJ, BUPIVACAINE LIPOSOME: HCPCS | Performed by: ANESTHESIOLOGY

## 2018-06-11 PROCEDURE — 25000128 H RX IP 250 OP 636: Performed by: ANESTHESIOLOGY

## 2018-06-11 PROCEDURE — 71000015 ZZH RECOVERY PHASE 1 LEVEL 2 EA ADDTL HR: Performed by: SURGERY

## 2018-06-11 PROCEDURE — 25000132 ZZH RX MED GY IP 250 OP 250 PS 637: Performed by: ANESTHESIOLOGY

## 2018-06-11 PROCEDURE — 25000128 H RX IP 250 OP 636: Performed by: SURGERY

## 2018-06-11 PROCEDURE — 25000125 ZZHC RX 250: Performed by: STUDENT IN AN ORGANIZED HEALTH CARE EDUCATION/TRAINING PROGRAM

## 2018-06-11 PROCEDURE — 82962 GLUCOSE BLOOD TEST: CPT

## 2018-06-11 PROCEDURE — 27210794 ZZH OR GENERAL SUPPLY STERILE: Performed by: SURGERY

## 2018-06-11 PROCEDURE — 71000014 ZZH RECOVERY PHASE 1 LEVEL 2 FIRST HR: Performed by: SURGERY

## 2018-06-11 PROCEDURE — 25000128 H RX IP 250 OP 636: Performed by: STUDENT IN AN ORGANIZED HEALTH CARE EDUCATION/TRAINING PROGRAM

## 2018-06-11 PROCEDURE — 25000566 ZZH SEVOFLURANE, EA 15 MIN: Performed by: SURGERY

## 2018-06-11 PROCEDURE — 27210995 ZZH RX 272: Performed by: SURGERY

## 2018-06-11 DEVICE — IMPLANTABLE DEVICE: Type: IMPLANTABLE DEVICE | Site: BREAST | Status: FUNCTIONAL

## 2018-06-11 RX ORDER — ONDANSETRON 2 MG/ML
INJECTION INTRAMUSCULAR; INTRAVENOUS PRN
Status: DISCONTINUED | OUTPATIENT
Start: 2018-06-11 | End: 2018-06-11

## 2018-06-11 RX ORDER — HYDROMORPHONE HYDROCHLORIDE 1 MG/ML
.3-.5 INJECTION, SOLUTION INTRAMUSCULAR; INTRAVENOUS; SUBCUTANEOUS EVERY 5 MIN PRN
Status: DISCONTINUED | OUTPATIENT
Start: 2018-06-11 | End: 2018-06-11 | Stop reason: HOSPADM

## 2018-06-11 RX ORDER — CEFAZOLIN SODIUM 2 G/100ML
2 INJECTION, SOLUTION INTRAVENOUS
Status: DISCONTINUED | OUTPATIENT
Start: 2018-06-11 | End: 2018-06-11

## 2018-06-11 RX ORDER — FLUMAZENIL 0.1 MG/ML
0.2 INJECTION, SOLUTION INTRAVENOUS
Status: DISCONTINUED | OUTPATIENT
Start: 2018-06-11 | End: 2018-06-11 | Stop reason: HOSPADM

## 2018-06-11 RX ORDER — ONDANSETRON 4 MG/1
4 TABLET, ORALLY DISINTEGRATING ORAL EVERY 6 HOURS PRN
Status: DISCONTINUED | OUTPATIENT
Start: 2018-06-11 | End: 2018-06-12 | Stop reason: HOSPADM

## 2018-06-11 RX ORDER — LIDOCAINE 40 MG/G
CREAM TOPICAL
Status: DISCONTINUED | OUTPATIENT
Start: 2018-06-11 | End: 2018-06-12 | Stop reason: HOSPADM

## 2018-06-11 RX ORDER — NALOXONE HYDROCHLORIDE 0.4 MG/ML
.1-.4 INJECTION, SOLUTION INTRAMUSCULAR; INTRAVENOUS; SUBCUTANEOUS
Status: DISCONTINUED | OUTPATIENT
Start: 2018-06-11 | End: 2018-06-11 | Stop reason: HOSPADM

## 2018-06-11 RX ORDER — HYDROMORPHONE HCL/0.9% NACL/PF 0.2MG/0.2
.2-.3 SYRINGE (ML) INTRAVENOUS
Status: DISCONTINUED | OUTPATIENT
Start: 2018-06-11 | End: 2018-06-12 | Stop reason: HOSPADM

## 2018-06-11 RX ORDER — CEFAZOLIN SODIUM 2 G/100ML
2 INJECTION, SOLUTION INTRAVENOUS
Status: COMPLETED | OUTPATIENT
Start: 2018-06-11 | End: 2018-06-11

## 2018-06-11 RX ORDER — ACETAMINOPHEN 325 MG/1
975 TABLET ORAL EVERY 8 HOURS
Status: DISCONTINUED | OUTPATIENT
Start: 2018-06-11 | End: 2018-06-12 | Stop reason: HOSPADM

## 2018-06-11 RX ORDER — DEXAMETHASONE SODIUM PHOSPHATE 4 MG/ML
INJECTION, SOLUTION INTRA-ARTICULAR; INTRALESIONAL; INTRAMUSCULAR; INTRAVENOUS; SOFT TISSUE PRN
Status: DISCONTINUED | OUTPATIENT
Start: 2018-06-11 | End: 2018-06-11

## 2018-06-11 RX ORDER — ACETAMINOPHEN 325 MG/1
975 TABLET ORAL ONCE
Status: COMPLETED | OUTPATIENT
Start: 2018-06-11 | End: 2018-06-11

## 2018-06-11 RX ORDER — SODIUM CHLORIDE, SODIUM LACTATE, POTASSIUM CHLORIDE, CALCIUM CHLORIDE 600; 310; 30; 20 MG/100ML; MG/100ML; MG/100ML; MG/100ML
INJECTION, SOLUTION INTRAVENOUS CONTINUOUS PRN
Status: DISCONTINUED | OUTPATIENT
Start: 2018-06-11 | End: 2018-06-11

## 2018-06-11 RX ORDER — LABETALOL HYDROCHLORIDE 5 MG/ML
10 INJECTION, SOLUTION INTRAVENOUS
Status: DISCONTINUED | OUTPATIENT
Start: 2018-06-11 | End: 2018-06-11 | Stop reason: HOSPADM

## 2018-06-11 RX ORDER — EPHEDRINE SULFATE 50 MG/ML
INJECTION, SOLUTION INTRAMUSCULAR; INTRAVENOUS; SUBCUTANEOUS PRN
Status: DISCONTINUED | OUTPATIENT
Start: 2018-06-11 | End: 2018-06-11

## 2018-06-11 RX ORDER — CEFAZOLIN SODIUM 1 G/3ML
1 INJECTION, POWDER, FOR SOLUTION INTRAMUSCULAR; INTRAVENOUS SEE ADMIN INSTRUCTIONS
Status: DISCONTINUED | OUTPATIENT
Start: 2018-06-11 | End: 2018-06-11 | Stop reason: HOSPADM

## 2018-06-11 RX ORDER — FENTANYL CITRATE 50 UG/ML
INJECTION, SOLUTION INTRAMUSCULAR; INTRAVENOUS PRN
Status: DISCONTINUED | OUTPATIENT
Start: 2018-06-11 | End: 2018-06-11

## 2018-06-11 RX ORDER — LIDOCAINE HYDROCHLORIDE 20 MG/ML
INJECTION, SOLUTION INFILTRATION; PERINEURAL PRN
Status: DISCONTINUED | OUTPATIENT
Start: 2018-06-11 | End: 2018-06-11

## 2018-06-11 RX ORDER — FENTANYL CITRATE 50 UG/ML
25-50 INJECTION, SOLUTION INTRAMUSCULAR; INTRAVENOUS
Status: DISCONTINUED | OUTPATIENT
Start: 2018-06-11 | End: 2018-06-11 | Stop reason: HOSPADM

## 2018-06-11 RX ORDER — SCOLOPAMINE TRANSDERMAL SYSTEM 1 MG/1
1 PATCH, EXTENDED RELEASE TRANSDERMAL ONCE
Status: COMPLETED | OUTPATIENT
Start: 2018-06-11 | End: 2018-06-11

## 2018-06-11 RX ORDER — ONDANSETRON 4 MG/1
4 TABLET, ORALLY DISINTEGRATING ORAL EVERY 30 MIN PRN
Status: DISCONTINUED | OUTPATIENT
Start: 2018-06-11 | End: 2018-06-11 | Stop reason: HOSPADM

## 2018-06-11 RX ORDER — CEFAZOLIN SODIUM 1 G/3ML
1 INJECTION, POWDER, FOR SOLUTION INTRAMUSCULAR; INTRAVENOUS EVERY 8 HOURS
Status: DISCONTINUED | OUTPATIENT
Start: 2018-06-11 | End: 2018-06-12 | Stop reason: HOSPADM

## 2018-06-11 RX ORDER — CEPHALEXIN 500 MG/1
500 CAPSULE ORAL 2 TIMES DAILY
Qty: 14 CAPSULE | Refills: 0 | Status: SHIPPED | OUTPATIENT
Start: 2018-06-11 | End: 2018-06-18

## 2018-06-11 RX ORDER — SODIUM CHLORIDE, SODIUM LACTATE, POTASSIUM CHLORIDE, CALCIUM CHLORIDE 600; 310; 30; 20 MG/100ML; MG/100ML; MG/100ML; MG/100ML
INJECTION, SOLUTION INTRAVENOUS CONTINUOUS
Status: DISCONTINUED | OUTPATIENT
Start: 2018-06-11 | End: 2018-06-12

## 2018-06-11 RX ORDER — NALOXONE HYDROCHLORIDE 0.4 MG/ML
.1-.4 INJECTION, SOLUTION INTRAMUSCULAR; INTRAVENOUS; SUBCUTANEOUS
Status: DISCONTINUED | OUTPATIENT
Start: 2018-06-11 | End: 2018-06-12 | Stop reason: HOSPADM

## 2018-06-11 RX ORDER — ONDANSETRON 2 MG/ML
4 INJECTION INTRAMUSCULAR; INTRAVENOUS EVERY 30 MIN PRN
Status: DISCONTINUED | OUTPATIENT
Start: 2018-06-11 | End: 2018-06-11 | Stop reason: HOSPADM

## 2018-06-11 RX ORDER — SODIUM CHLORIDE, SODIUM LACTATE, POTASSIUM CHLORIDE, CALCIUM CHLORIDE 600; 310; 30; 20 MG/100ML; MG/100ML; MG/100ML; MG/100ML
INJECTION, SOLUTION INTRAVENOUS CONTINUOUS
Status: DISCONTINUED | OUTPATIENT
Start: 2018-06-11 | End: 2018-06-11 | Stop reason: HOSPADM

## 2018-06-11 RX ORDER — LIDOCAINE 40 MG/G
CREAM TOPICAL
Status: DISCONTINUED | OUTPATIENT
Start: 2018-06-11 | End: 2018-06-11 | Stop reason: HOSPADM

## 2018-06-11 RX ORDER — OXYCODONE HYDROCHLORIDE 5 MG/1
5-10 TABLET ORAL EVERY 6 HOURS PRN
Status: DISCONTINUED | OUTPATIENT
Start: 2018-06-11 | End: 2018-06-12

## 2018-06-11 RX ORDER — BUPIVACAINE HYDROCHLORIDE 2.5 MG/ML
INJECTION, SOLUTION EPIDURAL; INFILTRATION; INTRACAUDAL PRN
Status: DISCONTINUED | OUTPATIENT
Start: 2018-06-11 | End: 2018-06-11

## 2018-06-11 RX ORDER — GLYCOPYRROLATE 0.2 MG/ML
INJECTION, SOLUTION INTRAMUSCULAR; INTRAVENOUS PRN
Status: DISCONTINUED | OUTPATIENT
Start: 2018-06-11 | End: 2018-06-11

## 2018-06-11 RX ORDER — NEOSTIGMINE METHYLSULFATE 1 MG/ML
VIAL (ML) INJECTION PRN
Status: DISCONTINUED | OUTPATIENT
Start: 2018-06-11 | End: 2018-06-11

## 2018-06-11 RX ORDER — ONDANSETRON 2 MG/ML
4 INJECTION INTRAMUSCULAR; INTRAVENOUS EVERY 6 HOURS PRN
Status: DISCONTINUED | OUTPATIENT
Start: 2018-06-11 | End: 2018-06-12 | Stop reason: HOSPADM

## 2018-06-11 RX ORDER — NALOXONE HYDROCHLORIDE 0.4 MG/ML
.1-.4 INJECTION, SOLUTION INTRAMUSCULAR; INTRAVENOUS; SUBCUTANEOUS
Status: ACTIVE | OUTPATIENT
Start: 2018-06-11 | End: 2018-06-12

## 2018-06-11 RX ORDER — PROPOFOL 10 MG/ML
INJECTION, EMULSION INTRAVENOUS PRN
Status: DISCONTINUED | OUTPATIENT
Start: 2018-06-11 | End: 2018-06-11

## 2018-06-11 RX ORDER — PROCHLORPERAZINE MALEATE 5 MG
10 TABLET ORAL EVERY 6 HOURS PRN
Status: DISCONTINUED | OUTPATIENT
Start: 2018-06-11 | End: 2018-06-12 | Stop reason: HOSPADM

## 2018-06-11 RX ORDER — CEFAZOLIN SODIUM 1 G/3ML
1 INJECTION, POWDER, FOR SOLUTION INTRAMUSCULAR; INTRAVENOUS SEE ADMIN INSTRUCTIONS
Status: DISCONTINUED | OUTPATIENT
Start: 2018-06-11 | End: 2018-06-11

## 2018-06-11 RX ADMIN — ACETAMINOPHEN 975 MG: 325 TABLET, FILM COATED ORAL at 22:52

## 2018-06-11 RX ADMIN — CEFAZOLIN 1 G: 1 INJECTION, POWDER, FOR SOLUTION INTRAMUSCULAR; INTRAVENOUS at 11:49

## 2018-06-11 RX ADMIN — Medication 0.2 MG: at 14:55

## 2018-06-11 RX ADMIN — ROCURONIUM BROMIDE 20 MG: 10 INJECTION INTRAVENOUS at 09:23

## 2018-06-11 RX ADMIN — ROCURONIUM BROMIDE 20 MG: 10 INJECTION INTRAVENOUS at 08:55

## 2018-06-11 RX ADMIN — Medication 5 MG: at 11:41

## 2018-06-11 RX ADMIN — GLYCOPYRROLATE 0.5 MG: 0.2 INJECTION, SOLUTION INTRAMUSCULAR; INTRAVENOUS at 12:19

## 2018-06-11 RX ADMIN — ROCURONIUM BROMIDE 20 MG: 10 INJECTION INTRAVENOUS at 09:52

## 2018-06-11 RX ADMIN — OXYCODONE HYDROCHLORIDE 10 MG: 5 TABLET ORAL at 22:52

## 2018-06-11 RX ADMIN — ONDANSETRON 4 MG: 2 INJECTION INTRAMUSCULAR; INTRAVENOUS at 16:51

## 2018-06-11 RX ADMIN — Medication 5 MG: at 09:12

## 2018-06-11 RX ADMIN — ONDANSETRON 4 MG: 2 INJECTION INTRAMUSCULAR; INTRAVENOUS at 11:37

## 2018-06-11 RX ADMIN — PHENYLEPHRINE HYDROCHLORIDE 100 MCG: 10 INJECTION, SOLUTION INTRAMUSCULAR; INTRAVENOUS; SUBCUTANEOUS at 08:07

## 2018-06-11 RX ADMIN — ONDANSETRON 4 MG: 4 TABLET, ORALLY DISINTEGRATING ORAL at 22:53

## 2018-06-11 RX ADMIN — ROCURONIUM BROMIDE 20 MG: 10 INJECTION INTRAVENOUS at 11:04

## 2018-06-11 RX ADMIN — MIDAZOLAM 0.5 MG: 1 INJECTION INTRAMUSCULAR; INTRAVENOUS at 07:14

## 2018-06-11 RX ADMIN — ROCURONIUM BROMIDE 30 MG: 10 INJECTION INTRAVENOUS at 07:44

## 2018-06-11 RX ADMIN — FENTANYL CITRATE 50 MCG: 50 INJECTION INTRAMUSCULAR; INTRAVENOUS at 07:05

## 2018-06-11 RX ADMIN — PROPOFOL 50 MG: 10 INJECTION, EMULSION INTRAVENOUS at 07:44

## 2018-06-11 RX ADMIN — MIDAZOLAM 1 MG: 1 INJECTION INTRAMUSCULAR; INTRAVENOUS at 07:32

## 2018-06-11 RX ADMIN — Medication 5 MG: at 11:04

## 2018-06-11 RX ADMIN — SCOPALAMINE 1 PATCH: 1 PATCH, EXTENDED RELEASE TRANSDERMAL at 07:26

## 2018-06-11 RX ADMIN — ROCURONIUM BROMIDE 20 MG: 10 INJECTION INTRAVENOUS at 10:24

## 2018-06-11 RX ADMIN — Medication 10 MG: at 08:40

## 2018-06-11 RX ADMIN — SODIUM CHLORIDE, POTASSIUM CHLORIDE, SODIUM LACTATE AND CALCIUM CHLORIDE: 600; 310; 30; 20 INJECTION, SOLUTION INTRAVENOUS at 08:49

## 2018-06-11 RX ADMIN — BUPIVACAINE 20 ML: 13.3 INJECTION, SUSPENSION, LIPOSOMAL INFILTRATION at 07:20

## 2018-06-11 RX ADMIN — Medication 5 MG: at 10:58

## 2018-06-11 RX ADMIN — PROPOFOL 20 MG: 10 INJECTION, EMULSION INTRAVENOUS at 07:46

## 2018-06-11 RX ADMIN — ROCURONIUM BROMIDE 20 MG: 10 INJECTION INTRAVENOUS at 08:10

## 2018-06-11 RX ADMIN — CEFAZOLIN SODIUM 1 G: 1 INJECTION, POWDER, FOR SOLUTION INTRAMUSCULAR; INTRAVENOUS at 22:58

## 2018-06-11 RX ADMIN — ACETAMINOPHEN 975 MG: 325 TABLET, FILM COATED ORAL at 15:00

## 2018-06-11 RX ADMIN — CEFAZOLIN 1 G: 1 INJECTION, POWDER, FOR SOLUTION INTRAMUSCULAR; INTRAVENOUS at 09:51

## 2018-06-11 RX ADMIN — Medication 5 MG: at 11:55

## 2018-06-11 RX ADMIN — MIDAZOLAM 0.5 MG: 1 INJECTION INTRAMUSCULAR; INTRAVENOUS at 07:05

## 2018-06-11 RX ADMIN — Medication 0.3 MG: at 13:33

## 2018-06-11 RX ADMIN — BUPIVACAINE HYDROCHLORIDE 20 ML: 2.5 INJECTION, SOLUTION EPIDURAL; INFILTRATION; INTRACAUDAL at 07:20

## 2018-06-11 RX ADMIN — CEFAZOLIN SODIUM 1 G: 1 INJECTION, POWDER, FOR SOLUTION INTRAMUSCULAR; INTRAVENOUS at 14:55

## 2018-06-11 RX ADMIN — PROPOFOL 50 MG: 10 INJECTION, EMULSION INTRAVENOUS at 07:45

## 2018-06-11 RX ADMIN — LIDOCAINE HYDROCHLORIDE 60 MG: 20 INJECTION, SOLUTION INFILTRATION; PERINEURAL at 07:44

## 2018-06-11 RX ADMIN — SODIUM CHLORIDE, POTASSIUM CHLORIDE, SODIUM LACTATE AND CALCIUM CHLORIDE: 600; 310; 30; 20 INJECTION, SOLUTION INTRAVENOUS at 12:38

## 2018-06-11 RX ADMIN — SODIUM CHLORIDE, POTASSIUM CHLORIDE, SODIUM LACTATE AND CALCIUM CHLORIDE: 600; 310; 30; 20 INJECTION, SOLUTION INTRAVENOUS at 07:34

## 2018-06-11 RX ADMIN — DEXAMETHASONE SODIUM PHOSPHATE 8 MG: 4 INJECTION, SOLUTION INTRA-ARTICULAR; INTRALESIONAL; INTRAMUSCULAR; INTRAVENOUS; SOFT TISSUE at 07:53

## 2018-06-11 RX ADMIN — ACETAMINOPHEN 975 MG: 325 TABLET, FILM COATED ORAL at 07:27

## 2018-06-11 RX ADMIN — OXYCODONE HYDROCHLORIDE 5 MG: 5 TABLET ORAL at 16:47

## 2018-06-11 RX ADMIN — FENTANYL CITRATE 50 MCG: 50 INJECTION, SOLUTION INTRAMUSCULAR; INTRAVENOUS at 09:28

## 2018-06-11 RX ADMIN — FENTANYL CITRATE 50 MCG: 50 INJECTION, SOLUTION INTRAMUSCULAR; INTRAVENOUS at 08:19

## 2018-06-11 RX ADMIN — OXYCODONE HYDROCHLORIDE 5 MG: 5 TABLET ORAL at 18:05

## 2018-06-11 RX ADMIN — GLYCOPYRROLATE 0.1 MG: 0.2 INJECTION, SOLUTION INTRAMUSCULAR; INTRAVENOUS at 08:28

## 2018-06-11 RX ADMIN — Medication 5 MG: at 08:28

## 2018-06-11 RX ADMIN — FENTANYL CITRATE 25 MCG: 50 INJECTION, SOLUTION INTRAMUSCULAR; INTRAVENOUS at 12:26

## 2018-06-11 RX ADMIN — HYDROMORPHONE HYDROCHLORIDE 0.25 MG: 1 INJECTION, SOLUTION INTRAMUSCULAR; INTRAVENOUS; SUBCUTANEOUS at 10:05

## 2018-06-11 RX ADMIN — CEFAZOLIN SODIUM 2 G: 2 INJECTION, SOLUTION INTRAVENOUS at 07:57

## 2018-06-11 RX ADMIN — Medication 5 MG: at 08:10

## 2018-06-11 RX ADMIN — NEOSTIGMINE METHYLSULFATE 2.5 MG: 1 INJECTION, SOLUTION INTRAVENOUS at 12:19

## 2018-06-11 RX ADMIN — SODIUM CHLORIDE, POTASSIUM CHLORIDE, SODIUM LACTATE AND CALCIUM CHLORIDE: 600; 310; 30; 20 INJECTION, SOLUTION INTRAVENOUS at 13:49

## 2018-06-11 ASSESSMENT — ENCOUNTER SYMPTOMS: SEIZURES: 0

## 2018-06-11 ASSESSMENT — ACTIVITIES OF DAILY LIVING (ADL)
RETIRED_COMMUNICATION: 0-->UNDERSTANDS/COMMUNICATES WITHOUT DIFFICULTY
DRESS: 0-->INDEPENDENT
AMBULATION: 0-->INDEPENDENT
FALL_HISTORY_WITHIN_LAST_SIX_MONTHS: NO
SWALLOWING: 0-->SWALLOWS FOODS/LIQUIDS WITHOUT DIFFICULTY
TRANSFERRING: 0-->INDEPENDENT
TOILETING: 0-->INDEPENDENT
RETIRED_EATING: 0-->INDEPENDENT
BATHING: 0-->INDEPENDENT
COGNITION: 0 - NO COGNITION ISSUES REPORTED

## 2018-06-11 ASSESSMENT — PAIN DESCRIPTION - DESCRIPTORS
DESCRIPTORS: SORE

## 2018-06-11 NOTE — OP NOTE
Procedure Date: 06/11/2018      DATE OF PROCEDURE:  06/11/2018      PREOPERATIVE DIAGNOSIS:  BRCA mutation.      POSTOPERATIVE DIAGNOSIS:  BRCA mutation.      PROCEDURE PERFORMED:      Bilateral breast reconstruction with tissue expander placement CPT 92108-53  Bilateral Acellular dermal matrix reinforcement of the breast reconstruction.  CPT 60234-28        ATTENDING SURGEON:  Mahamed Grace MD      ANESTHESIA:  General with endotracheal intubation.       FINDINGS:  350 mL saline in each expander    IMPLANTS:    Implant Name Type Inv. Item Serial No.  Lot No. LRB No. Used    tissue expander High profile  [id number] [employer] [id number] Right 1   tissue expander   [id number] [employer] [id number] Left 1   alloderm select duo       [employer] [id number] Bilateral 328        INDICATIONS FOR SURGERY:  This is a 28-year-old woman with a strong family history of breast cancer and she was found to have the BRCA1 mutation.  She elected to undergo bilateral skin-sparing mastectomies.  She specifically declined nipple and areolar sparing mastectomies.  She expressed the desire for immediate reconstruction.  I discussed with her the situation and treatment options.  I described the potential risk and hazards of this procedure and the typical expected outcomes.  With her very thin body habitus, she will have some compromised reconstruction with some visible features such as underlying contour outlines of the implants and that there other risks that require reoperation over time.  She indicated a satisfactory understanding and appeared to understand and agreed to proceed.      DESCRIPTION OF PROCEDURE:  After informed consent, the patient was brought to the operating room, given general anesthetic, and orotracheally intubated.  She was prepped and draped in the usual fashion.  The patient underwent mastectomies with Dr. Henderson and I defer to his note for complete details.  Once he was  completed, I entered the operative field.  Each side was operated on in a similar fashion and will be dictated as one.      We performed a prepectoral tissue expander placement.  We first placed the 15 mm round drains brought out through a separate stab incision inferolaterally.  We then measured and incised the appropriate expanders and placed the tissue expanders into the cavity, and used a Lowell needle to attach 1 medial tab to the other to prevent lateral displacement.  This was done was all subcutaneously.  We then secured the other suture tabs into place.      We then washed the AlloDerm with the triple antibiotic solution as we had the expanders themselves and then placed the expanders with the epidermal origin side external up against the underlying flaps and then inset the AlloDerm all the way around the lower pole at the inframammary fold site.  We then inflated the expanders to 250 mL and that adequately filled the cavity and the skin was without any tension.  We gently tacked the superior aspect of the AlloDerm into the underlying skin with interrupted Vicryl.      We then closed the skin with interrupted running 3-0 PDS.  At this point, the skin appeared it would tolerate more expansion fill, so we placed to a total of 350 mL of saline in each expander.  The skin does not appear to be in excess tension at this point.  The expanders seen to be symmetric and satisfactorily located.  We applied sterile dressings.  The patient tolerated the procedure well and was transferred to the recovery room in stable condition.         KRISTA COLLIER MD             D: 2018   T: 2018   MT: KARAN      Name:     BELÉN WALTON   MRN:      [id number]        Account:        [id number]   :      1990           Procedure Date: 2018      Document: [id number]

## 2018-06-11 NOTE — BRIEF OP NOTE
[facility], [place]    Brief Operative Note    Pre-operative diagnosis: BRCA 1+  Post-operative diagnosis Same   Procedure: Procedure(s):  Bilateral Non Nipple Sparing Mastectomy with Bilateral Breast Reconstruction with Tisssue Expanders and Alloderm, Anesthesia Block   - Wound Class: I-Clean  Surgeon: Surgeon(s) and Role:  Panel 1:     * Jose Daniel Henderson MD - Primary     * Lucio Haney MD - Resident - Assisting    Panel 2:     * Mahamed Grace MD - Primary  Anesthesia: Combined General with Block   Estimated blood loss: 25 cc  Drains:  None for our part   Specimens:   ID Type Source Tests Collected by Time Destination   A : [place] breast Tissue Breast, Left SURGICAL PATHOLOGY EXAM Jose Daniel Henderson MD 6/11/2018  9:08 AM    B : right breast Tissue Breast, Right SURGICAL PATHOLOGY EXAM Jose Daniel Henderson MD 6/11/2018 10:07 AM      Findings:   Bilateral mastectomy, immediate reconstruction with plastic surgery   Complications: None.  Implants: None.

## 2018-06-11 NOTE — PROGRESS NOTES
Pt complained of topical burning sensation mid chest. Pain does not radiate. Denies heartburn . Pagekarl LANDEROS. VSS

## 2018-06-11 NOTE — ANESTHESIA CARE TRANSFER NOTE
Patient: Ilda Hernandez    Procedure(s):  Bilateral Non Nipple Sparing Mastectomy with Bilateral Breast Reconstruction with Tisssue Expanders and Alloderm, Anesthesia Block - Wound Class: I-Clean   - Wound Class: I-Clean    Diagnosis: BRCA 1+  Diagnosis Additional Information: No value filed.    Anesthesia Type:   General, ETT     Note:  Airway :Face Mask  Patient transferred to:PACU  Comments: Patient to PACU, appears comfortable, denies pain, exchanging air well  Report given at bedside to Lucy XIONG  VSS /65, NSR 82, sat 100% on 6L face mask RR12, temp.96.8Handoff Report: Identifed the Patient, Identified the Reponsible Provider, Reviewed the pertinent medical history, Discussed the surgical course, Reviewed Intra-OP anesthesia mangement and issues during anesthesia, Set expectations for post-procedure period and Allowed opportunity for questions and acknowledgement of understanding      Vitals: (Last set prior to Anesthesia Care Transfer)    CRNA VITALS  6/11/2018 1209 - 6/11/2018 1245      6/11/2018             Pulse: 91    SpO2: 100 %    Resp Rate (set): 10                Electronically Signed By: GIULIANO Frias CRNA  June 11, 2018  12:45 PM

## 2018-06-11 NOTE — ANESTHESIA PREPROCEDURE EVALUATION
Anesthesia Evaluation     . Pt has had prior anesthetic. Type: General    History of anesthetic complications   - PONV        ROS/MED HX    ENT/Pulmonary:  - neg pulmonary ROS     Neurologic:  - neg neurologic ROS    (-) seizures and CVA   Cardiovascular:  - neg cardiovascular ROS       METS/Exercise Tolerance:  >4 METS   Hematologic:         Musculoskeletal:         GI/Hepatic:  - neg GI/hepatic ROS      (-) GERD and liver disease   Renal/Genitourinary:      (-) renal disease   Endo:         Psychiatric:         Infectious Disease:         Malignancy:   (+)   BRCA +        Other:                     Physical Exam  Normal systems: dental    Airway   Mallampati: I  TM distance: >3 FB  Neck ROM: full    Dental     Cardiovascular   Rhythm and rate: regular and normal      Pulmonary    breath sounds clear to auscultation                    Anesthesia Plan      History & Physical Review  History and physical reviewed and following examination; no interval change.    ASA Status:  1 .    NPO Status:  > 8 hours    Plan for General and ETT with Intravenous induction. Maintenance will be Balanced.    PONV prophylaxis:  Ondansetron (or other 5HT-3), Dexamethasone or Solumedrol and Scopolamine patch  Additional equipment: 2nd IV      Postoperative Care  Postoperative pain management:  IV analgesics and Peripheral nerve block (Single Shot).      Consents  Anesthetic plan, risks, benefits and alternatives discussed with:  Patient..        ANESTHESIA PREOP EVALUATION    HPI: Ilda Hernandez is a 28 year old female who presents for Procedure(s):  Bilateral Non Nipple Sparing Mastectomy with Bilateral Breast Reconstruction with Tisssue Expanders and Alloderm, Anesthesia Block   - Wound Class: I-Clean    PMHx/PSHx/ROS:  Past Medical History:   Diagnosis Date     BRCA1 positive 11/23/2011    [id number], identified using site specific testing through [facility]     PONV (postoperative nausea and vomiting)        Past Surgical  History:   Procedure Laterality Date     BREAST LUMPECTOMY, RT/LT Left 01/2012    fibroadenoma       Past Anes Hx: No personal or family h/o anesthesia problems    Soc Hx:   Social History   Substance Use Topics     Smoking status: Never Smoker     Smokeless tobacco: Never Used     Alcohol use Yes      Comment: maybe 1/2 to 1 per week       Allergies: No Known Allergies    Meds:   Current Facility-Administered Medications   Medication     bupivacaine liposome (EXPAREL) 1.3 % LA inj susp 20 mL     ceFAZolin (ANCEF) 1 g vial to attach to  ml bag for ADULT or 50 ml bag for PEDS     ceFAZolin (ANCEF) intermittent infusion 2 g in 100 mL dextrose PRE-MIX     fentaNYL (PF) (SUBLIMAZE) injection 25-50 mcg     flumazenil (ROMAZICON) injection 0.2 mg     lactated ringers infusion     lidocaine (LMX4) kit     lidocaine 1 % 1 mL     midazolam (VERSED) injection 1-2 mg     naloxone (NARCAN) injection 0.1-0.4 mg     sodium chloride (PF) 0.9% PF flush 3 mL     sodium chloride (PF) 0.9% PF flush 3 mL       NPO Status: >8 hours     Labs:    BMP:  No results for input(s): NA, POTASSIUM, CHLORIDE, CO2, BUN, CR, GLC, HAROON in the last 12026 hours.  CBC:   Recent Labs   Lab Test  03/12/18   1644   WBC  8.5   RBC  4.64   HGB  12.4   HCT  38.8   MCV  84   MCH  26.7   MCHC  32.0   RDW  13.7   PLT  226     Coags:  No results for input(s): INR, PTT, FIBR in the last 28437 hours.    Meghan Zuniga MD  Staff Anesthesiologist  Pager [phone]  6/11/2018  6:55 AM                      .

## 2018-06-11 NOTE — OR NURSING
Paged Dr. Henderson to clarify, nipple sparing vs non nipple spraring (pt says non nipple sparing) however consent orders for both in chart.

## 2018-06-11 NOTE — PLAN OF CARE
"Problem: Patient Care Overview  Goal: Plan of Care/Patient Progress Review  Outcome: No Change  /56  Pulse 72  Temp 96.1  F (35.6  C) (Oral)  Resp 12  Ht 1.676 m (5' 6\")  Wt 52.1 kg (114 lb 13.8 oz)  SpO2 98%  BMI 18.54 kg/m2    Pt up from PACU around 1430. Pain managed with dilaudid. Denies zofran. PIV infusing MIVF. MARK x2. Breast inc covered with ACE wraps, C/D/I. Flores removed in the OR, pt hasn't voided yet. No gas or BM.       "

## 2018-06-11 NOTE — OP NOTE
Procedure Date: 06/11/2018      DATE OF OPERATION:  06/11/2018      PREOPERATIVE DIAGNOSIS:  BRCA mutation.      POSTOPERATIVE DIAGNOSIS:  BRCA mutation.      PROCEDURE:  Bilateral skin-sparing mastectomies.      ATTENDING SURGEON:  Jose Daniel Henderson MD      RESIDENT SURGEON:  Lucio Haney MD      ANESTHESIA:  General with endotracheal tube intubation.      INDICATIONS FOR SURGERY:  The patient is a 28-year-old woman who has a strong family history of breast cancer.  She was found to have a BRCA1 mutation.  She has elected to undergo bilateral skin-sparing mastectomies.  She declined nipple-sparing mastectomies.  She wanted immediate reconstruction.      PROCEDURE IN DETAIL:  After informed consent, the patient was brought to the operating room, given a general anesthetic and orotracheally intubated.  She was prepped and draped in the usual fashion.  Starting on the patient's left side, I made an elliptical skin incision just around the nipple areolar complex.  I then extended the incision laterally towards the axilla.  The Bovie cautery was used to incise the subcutaneous tissues.  I raise flaps sharply with the Bovie cautery.  A superior skin flap was raised to the clavicle, a medial skin flap was raised to lateral border of the sternum and inferior skin flap was raised to the inframammary fold.  Next, the breast and pectoralis fascia were removed from the pectoralis major muscle from superior to inferior and from medial to lateral.  After the specimen was dissected off the lateral border of the pectoralis major muscle, it was divided and sent to pathology.  Strict hemostasis was obtained with the Bovie cautery.  I performed a mirror image incision on the patient's right side and again raised flaps sharply with the Bovie cautery.  A superior skin flap was raised to the clavicle, a medial skin flap was raised to the lateral border of the sternum and inferior skin flap was raised to inframammary fold.  The breast and  pectoralis fascia were removed the pectoralis major muscle from superior to inferior and from medial to lateral.  After the specimen was dissected off the lateral border of the pectoralis major muscle, it was divided and sent to pathology.  Again, strict hemostasis was obtained with the Bovie cautery.  Laparotomy pad was placed in each mastectomy incision temporarily until Dr. Grace performed the reconstruction, which will be dictated in a separate note.         JANNIE CHAND MD             D: 2018   T: 2018   MT: LILIAN      Name:     BELÉN WALTON   MRN:      [id number]        Account:        [id number]   :      1990           Procedure Date: 2018      Document: [id number]

## 2018-06-11 NOTE — ANESTHESIA PROCEDURE NOTES
Peripheral Nerve Block Procedure Note    Staff:     Anesthesiologist:  WILLY ALFONSO    Resident/CRNA:  BOBBY LINO    Block performed by resident/CRNA in the presence of a teaching physician      Referred By:  JANNIE CHAND  Location: Pre-op  Procedure Start/Stop TImes:      6/11/2018 7:10 AM     6/11/2018 7:20 AM    patient identified, IV checked, site marked, risks and benefits discussed, informed consent, monitors and equipment checked, pre-op evaluation, at physician/surgeon's request and post-op pain management      Correct Patient: Yes      Correct Position: Yes      Correct Site: Yes      Correct Procedure: Yes      Correct Laterality:  Yes    Site Marked:  Yes  Procedure details:     Procedure:  Pectoralis    Laterality:  Bilateral    Position:  Supine    Sterile Prep: chloraprep, patient draped, mask and sterile gloves      Local skin infiltration:  None    Needle:  Short bevel and insulated    Needle gauge:  21    Needle length (mm):  110    Ultrasound: Yes      Ultrasound used to identify targeted nerve, plexus, or vascular structure and placed a needle adjacent to it      Permanent Image entered into patiient's record      Abnormal pain on injection: No      Blood Aspirated: No      Paresthesias:  No    Bleeding at site: No      Bolus via:  Needle    Infusion Method:  Single Shot    Complications:  None

## 2018-06-11 NOTE — IP AVS SNAPSHOT
MRN:[id number]                      After Visit Summary   6/11/2018    Ilda Hernandez    MRN: [id number]           Thank you!     Thank you for choosing [facility] for your care. Our goal is always to provide you with excellent care. Hearing back from our patients is one way we can continue to improve our services. Please take a few minutes to complete the written survey that you may receive in the mail after you visit with us. Thank you!        Patient Information     Date Of Birth          1990        Designated Caregiver       Most Recent Value    Caregiver    Will someone help with your care after discharge? yes    Name of designated caregiver Saurabh Hernandez    Phone number of caregiver [phone]    Caregiver address [place], [place]      About your hospital stay     You were admitted on:  June 11, 2018 You last received care in the:  [address] [facility]    You were discharged on:  June 12, 2018        Reason for your hospital stay       BRCA1 positive  (primary encounter diagnosis)  Acute post-operative pain                  Who to Call     For medical emergencies, please call 911.  For non-urgent questions about your medical care, please call your primary care provider or clinic, [phone]  For questions related to your surgery, please call your surgery clinic        Attending Provider     Provider Jose Daniel Andrea MD General Surgery       Primary Care Provider Office Phone # Fax #    Lisa Liao [phone]      After Care Instructions     Activity       Your activity upon discharge: activity as tolerated.   No lifting > 10 lbs for next 6 weeks            Diet       Follow this diet upon discharge: Orders Placed This Encounter      Advance Diet as Tolerated: Regular Diet Adult; Regular Diet Adult              Discharge Instructions       From Dr Henderson:    1. Patient to follow up with appointment in 2 weeks with Dr. Henderson. Follow up with Dr. Grace  "this week  2. Do not drive while taking narcotic pain medication (oxycodone).   3. May take plain Tylenol as needed for pain. Do not take at the same time as Norco as you can overdose on acetaminophen.   4. Avoid non-steroidal anti-inflammatory medications (Advil, Ibuprofen, Naproxen, aspirin, etc) for 5-7 days or until approved by the Plastic Surgery team.   5. Caution with putting ice on the incision as it will be numb you will not realize it if you leave the ice for too long and can damage your skin.  6. If you develop any fever/chills, worsening pain, redness, swelling, or drainage from your wound please call the clinic (Monday through Friday 8:00am-5:00pm [phone] Cristin XIONG) or on-call surgical oncology resident (nights and weekends [phone] and ask \"I would like to page the Surgical Oncology Resident on call.\")   7. No lifting over 5-10 lbs and no strenuous physical activity for 6 weeks.   8. Keep dressing clean and dry. Please refer to Dr Grace's wound care instructions.   9. Monitor the drain output. Record the drain output per 24 hours. Your drain will be removed by Dr. Grace at a follow up appointment.   Drain care:  Please keep drain site clean and dry.   Okay to shower with drain sites covered per Plastic Surgery.   Please call the clinic (see phone numbers above) if:  Your drain falls out.  The stitch that is holding the drain in place at the skin is coming loose or missing.  The drainage fluid is very thick and/or has a bad odor.  The squeeze bulb does not stay collapsed.  The drainage suddenly stops or dramatically decreases when the drain has been having steady amounts of drainage.  Please keep a log of the drainage amounts every time you empty the drain.    Please \"strip\" the drain 3 times per day:  Wash your hands with soap and water and dry.  Gently squeeze the tubing if you see a clot to loosen it up.   and pinch the drain where it comes out of the skin with one hand, to steady it.  " With the other hand,  and pinch the drain and slide your fingers down the tubing, towards the drainage bulb.  Then release your  on the end where the tube comes out of your body, followed by releasing your  at the end of the drainage bulb.    Repeat several times.  It may be easier to 'strip' the drain with an alcohol swab or with hand cleanser on the hand that is moving along the tube.  Wash your hands again.            Dressing       From Dr. Grace  Dress the drain sites daily with cotton gauze. It is important to not get the drain exit sites wet.  Beginning on Thursday, the surgical incisions may be left open to air.  You may apply a simple cotton gauze dressing to the incisions if there is drainage, or if you feel more comfortable doing so.  It is OK to shower and get the surgical incisions wet.  These areas are safe from infection 48 hours after surgery  Strip and empty and reapply suction to the drains as needed.    Record the drain output and bring the record to your office appointments.                  Follow-up Appointments     Follow Up ([facility])       Please see Dr. Grace at his [facility] office this week.  Call [phone] to make an appointment.  Follow up with Dr. Henderson next week. Please call [phone] to schedule.                  Pending Results     Date and Time Order Name Status Description    6/11/2018 0909 Surgical pathology exam In process             Statement of Approval     Ordered          06/12/18 0709  I have reviewed and agree with all the recommendations and orders detailed in this document.  EFFECTIVE NOW     Approved and electronically signed by:  Ursula Horan, PA-C             Admission Information     Date & Time Provider Department Dept. Phone    6/11/2018 Jose Daniel Henderson MD [address] [facility] [place] [phone]      Your Vitals Were     Blood Pressure Pulse Temperature Respirations Height Weight    97/60 (BP Location: Right arm) 70 97.8  F (36.6  C)  "(Oral) 15 1.676 m (5' 6\") 52.1 kg (114 lb 13.8 oz)    Pulse Oximetry BMI (Body Mass Index)                100% 18.54 kg/m2          [employer] Information     [employer] gives you secure access to your electronic health record. If you see a primary care provider, you can also send messages to your care team and make appointments. If you have questions, please call your primary care clinic.  If you do not have a primary care provider, please call [phone] and they will assist you.        Care EveryWhere ID     This is your Care EveryWhere ID. This could be used by other organizations to access your [facility] medical records  [id number]        Equal Access to Services     CEHTAN HERNANDEZ : Deepika Bautista, billy conde, yimi vieira, radha perez. So [facility] [phone].    ATENCIÓN: Si habla español, tiene a reilly disposición servicios gratuitos de asistencia lingüística. Llame al [phone].    We comply with applicable federal civil rights laws and [place] laws. We do not discriminate on the basis of race, color, national origin, age, disability, sex, sexual orientation, or gender identity.               Review of your medicines      START taking        Dose / Directions    acetaminophen 325 MG tablet   Commonly known as:  TYLENOL   Used for:  Acute post-operative pain        Dose:  975 mg   Take 3 tablets (975 mg) by mouth every 8 hours as needed for mild pain   Quantity:  50 tablet   Refills:  0       cephALEXin 500 MG capsule   Commonly known as:  KEFLEX   Used for:  BRCA1 positive        Dose:  500 mg   Take 1 capsule (500 mg) by mouth 2 times daily for 7 days   Quantity:  14 capsule   Refills:  0       ondansetron 4 MG ODT tab   Commonly known as:  ZOFRAN-ODT   Used for:  Post-operative nausea and vomiting        Dose:  4 mg   Take 1 tablet (4 mg) by mouth every 6 hours as needed for nausea or vomiting   Quantity:  5 tablet   Refills:  0       oxyCODONE IR " "5 MG tablet   Commonly known as:  ROXICODONE   Used for:  Acute post-operative pain        Dose:  5-10 mg   Take 1-2 tablets (5-10 mg) by mouth every 4 hours as needed for severe pain or other (pain control or improvement in physical function. Hold dose for analgesic side effects.)   Quantity:  30 tablet   Refills:  0         CONTINUE these medicines which have NOT CHANGED        Dose / Directions    UNABLE TO FIND        Dose:  2 chew tab   2 chew tab MEDICATION NAME: Naturemade hair, skin and nails.   Refills:  0       VITAMIN C GUMMIE PO        Dose:  2 chew tab   Take 2 chew tab by mouth   Refills:  0       ZICAM ALLERGY RELIEF NA        Refills:  0            Where to get your medicines      These medications were sent to [facility] [place], [place] - [address] [place] [address]     Phone:  [phone]     acetaminophen 325 MG tablet    ondansetron 4 MG ODT tab         Some of these will need a paper prescription and others can be bought over the counter. Ask your nurse if you have questions.     Bring a paper prescription for each of these medications     cephALEXin 500 MG capsule    oxyCODONE IR 5 MG tablet                Protect others around you: Learn how to safely use, store and throw away your medicines at www.disposemymeds.org.        Information about your nerve block     Today you received a block to numb the nerves near your surgery site.    This is a block using local anesthetic or \"numbing\" medication injected around the nerves to anesthetize or \"numb\" the area supplied by those nerves. This block is injected into the muscle layer near your surgical site. The type of anesthesia (Exparel) your anesthesia team used to numb your abdomen may give you relief for up to 72 hours.     Diet: There are no diet restrictions, but you should drink plenty of fluids, unless you are on a fluid-restricted diet.     Activity: If your surgical site is an arm or " leg you should be careful with your affected limb, since it is possible to injure your limb without being aware of it due to the numbing. Until full feeling returns, you should guard against bumping or hitting your limb, and avoid extreme hot or cold temperatures on the skin.    Pain Medication: As the block wears off, the feeling will return as a tingling or prickly sensation near your surgical site. You will experience more discomfort from your incisions as the feeling returns. You may want to take a pain pill (a narcotic or Tylenol if this was prescribed by your surgeon) when you start to experience mild pain, before the pain becomes more severe. If your pain medications do not control your pain, you should notify your surgeon. If you are taking narcotics for pain management, do not drink alcohol, drive a car, or perform hazardous activities.  If you have questions or concerns you may call your surgeon at the number provided with your discharge instructions.     Call your surgeon if you experience blurry vision, ringing in the ears or metallic taste in your mouth.         ANTIBIOTIC INSTRUCTION     You've Been Prescribed an Antibiotic - Now What?  Your healthcare team thinks that you or your loved one might have an infection. Some infections can be treated with antibiotics, which are powerful, life-saving drugs. Like all medications, antibiotics have side effects and should only be used when necessary. There are some important things you should know about your antibiotic treatment.      Your healthcare team may run tests before you start taking an antibiotic.    Your team may take samples (e.g., from your blood, urine or other areas) to run tests to look for bacteria. These test can be important to determine if you need an antibiotic at all and, if you do, which antibiotic will work best.      Within a few days, your healthcare team might change or even stop your antibiotic.    Your team may start you on an  antibiotic while they are working to find out what is making you sick.    Your team might change your antibiotic because test results show that a different antibiotic would be better to treat your infection.    In some cases, once your team has more information, they learn that you do not need an antibiotic at all. They may find out that you don't have an infection, or that the antibiotic you're taking won't work against your infection. For example, an infection caused by a virus can't be treated with antibiotics. Staying on an antibiotic when you don't need it is more likely to be harmful than helpful.      You may experience side effects from your antibiotic.    Like all medications, antibiotics have side effects. Some of these can be serious.    Let you healthcare team know if you have any known allergies when you are admitted to the hospital.    One significant side effect of nearly all antibiotics is the risk of severe and sometimes deadly diarrhea caused by Clostridium difficile (C. Difficile). This occurs when a person takes antibiotics because some good germs are destroyed. Antibiotic use allows C. diificile to take over, putting patients at high risk for this serious infection.    As a patient or caregiver, it is important to understand your or your loved one's antibiotic treatment. It is especially important for caregivers to speak up when patients can't speak for themselves. Here are some important questions to ask your healthcare team.    What infection is this antibiotic treating and how do you know I have that infection?    What side effects might occur from this antibiotic?    How long will I need to take this antibiotic?    Is it safe to take this antibiotic with other medications or supplements (e.g., vitamins) that I am taking?     Are there any special directions I need to know about taking this antibiotic? For example, should I take it with food?    How will I be monitored to know whether my  infection is responding to the antibiotic?    What tests may help to make sure the right antibiotic is prescribed for me?      Information provided by:  www.cdc.gov/getsmart  U.S. Department of Health and Human Services  Centers for disease Control and Prevention  National Center for Emerging and Zoonotic Infectious Diseases  Division of Healthcare Quality Promotion        Information about OPIOIDS     PRESCRIPTION OPIOIDS: WHAT YOU NEED TO KNOW   We gave you an opioid (narcotic) pain medicine. It is important to manage your pain, but opioids are not always the best choice. You should first try all the other options your care team gave you. Take this medicine for as short a time (and as few doses) as possible.     These medicines have risks:    DO NOT drive when on new or higher doses of pain medicine. These medicines can affect your alertness and reaction times, and you could be arrested for driving under the influence (DUI). If you need to use opioids long-term, talk to your care team about driving.    DO NOT operate heave machinery    DO NOT do any other dangerous activities while taking these medicines.     DO NOT drink any alcohol while taking these medicines.      If the opioid prescribed includes acetaminophen, DO NOT take with any other medicines that contain acetaminophen. Read all labels carefully. Look for the word  acetaminophen  or  Tylenol.  Ask your pharmacist if you have questions or are unsure.    You can get addicted to pain medicines, especially if you have a history of addiction (chemical, alcohol or substance dependence). Talk to your care team about ways to reduce this risk.    Store your pills in a secure place, locked if possible. We will not replace any lost or stolen medicine. If you don t finish your medicine, please throw away (dispose) as directed by your pharmacist. The [place] Pollution Control Agency has more information about safe disposal:  https://www.pca.[facility].[place].us/living-green/managing-unwanted-medications.     All opioids tend to cause constipation. Drink plenty of water and eat foods that have a lot of fiber, such as fruits, vegetables, prune juice, apple juice and high-fiber cereal. Take a laxative (Miralax, milk of magnesia, Colace, Senna) if you don t move your bowels at least every other day.              Medication List: This is a list of all your medications and when to take them. Check marks below indicate your daily home schedule. Keep this list as a reference.      Medications           Morning Afternoon Evening Bedtime As Needed    acetaminophen 325 MG tablet   Commonly known as:  TYLENOL   Take 3 tablets (975 mg) by mouth every 8 hours as needed for mild pain   Last time this was given:  975 mg on 6/12/2018  7:05 AM                                cephALEXin 500 MG capsule   Commonly known as:  KEFLEX   Take 1 capsule (500 mg) by mouth 2 times daily for 7 days                                ondansetron 4 MG ODT tab   Commonly known as:  ZOFRAN-ODT   Take 1 tablet (4 mg) by mouth every 6 hours as needed for nausea or vomiting   Last time this was given:  4 mg on 6/12/2018  5:26 AM                                oxyCODONE IR 5 MG tablet   Commonly known as:  ROXICODONE   Take 1-2 tablets (5-10 mg) by mouth every 4 hours as needed for severe pain or other (pain control or improvement in physical function. Hold dose for analgesic side effects.)   Last time this was given:  10 mg on 6/12/2018 10:40 AM                                UNABLE TO FIND   2 chew tab MEDICATION NAME: Naturemade hair, skin and nails.   Last time this was given:  6/12/2018  6:59 AM                                VITAMIN C GUMMIE PO   Take 2 chew tab by mouth                                ZICAM ALLERGY RELIEF NA

## 2018-06-11 NOTE — IP AVS SNAPSHOT
[address] [address]    Phone:  [phone]                                       After Visit Summary   6/11/2018    Ilda Hernandez    MRN: [id number]           After Visit Summary Signature Page     I have received my discharge instructions, and my questions have been answered. I have discussed any challenges I see with this plan with the nurse or doctor.    ..........................................................................................................................................  Patient/Patient Representative Signature      ..........................................................................................................................................  Patient Representative Print Name and Relationship to Patient    ..................................................               ................................................  Date                                            Time    ..........................................................................................................................................  Reviewed by Signature/Title    ...................................................              ..............................................  Date                                                            Time

## 2018-06-12 VITALS
OXYGEN SATURATION: 100 % | HEIGHT: 66 IN | TEMPERATURE: 97.8 F | WEIGHT: 114.86 LBS | BODY MASS INDEX: 18.46 KG/M2 | RESPIRATION RATE: 15 BRPM | SYSTOLIC BLOOD PRESSURE: 97 MMHG | DIASTOLIC BLOOD PRESSURE: 60 MMHG | HEART RATE: 70 BPM

## 2018-06-12 PROCEDURE — 25000125 ZZHC RX 250: Performed by: STUDENT IN AN ORGANIZED HEALTH CARE EDUCATION/TRAINING PROGRAM

## 2018-06-12 PROCEDURE — 25000128 H RX IP 250 OP 636: Performed by: STUDENT IN AN ORGANIZED HEALTH CARE EDUCATION/TRAINING PROGRAM

## 2018-06-12 PROCEDURE — 99213 OFFICE O/P EST LOW 20 MIN: CPT | Performed by: NURSE PRACTITIONER

## 2018-06-12 PROCEDURE — 25000132 ZZH RX MED GY IP 250 OP 250 PS 637: Performed by: STUDENT IN AN ORGANIZED HEALTH CARE EDUCATION/TRAINING PROGRAM

## 2018-06-12 RX ORDER — ONDANSETRON 4 MG/1
4 TABLET, ORALLY DISINTEGRATING ORAL EVERY 6 HOURS PRN
Qty: 5 TABLET | Refills: 0 | Status: SHIPPED | OUTPATIENT
Start: 2018-06-12 | End: 2018-07-20

## 2018-06-12 RX ORDER — OXYCODONE HYDROCHLORIDE 5 MG/1
5-10 TABLET ORAL EVERY 4 HOURS PRN
Qty: 30 TABLET | Refills: 0 | Status: SHIPPED | OUTPATIENT
Start: 2018-06-12 | End: 2018-07-20

## 2018-06-12 RX ORDER — ACETAMINOPHEN 325 MG/1
975 TABLET ORAL EVERY 8 HOURS PRN
Qty: 50 TABLET | Refills: 0 | Status: SHIPPED | OUTPATIENT
Start: 2018-06-12 | End: 2019-11-05

## 2018-06-12 RX ORDER — OXYCODONE HYDROCHLORIDE 5 MG/1
5-10 TABLET ORAL EVERY 4 HOURS PRN
Status: DISCONTINUED | OUTPATIENT
Start: 2018-06-12 | End: 2018-06-12 | Stop reason: HOSPADM

## 2018-06-12 RX ORDER — OXYCODONE HYDROCHLORIDE 5 MG/1
5-10 TABLET ORAL EVERY 6 HOURS PRN
Qty: 30 TABLET | Refills: 0 | Status: SHIPPED | OUTPATIENT
Start: 2018-06-12 | End: 2018-06-12

## 2018-06-12 RX ADMIN — OXYCODONE HYDROCHLORIDE 10 MG: 5 TABLET ORAL at 10:40

## 2018-06-12 RX ADMIN — ACETAMINOPHEN 650 MG: 325 TABLET, FILM COATED ORAL at 14:59

## 2018-06-12 RX ADMIN — OXYCODONE HYDROCHLORIDE 10 MG: 5 TABLET ORAL at 05:26

## 2018-06-12 RX ADMIN — OXYCODONE HYDROCHLORIDE 10 MG: 5 TABLET ORAL at 14:59

## 2018-06-12 RX ADMIN — Medication 0.2 MG: at 03:59

## 2018-06-12 RX ADMIN — Medication 0.3 MG: at 08:48

## 2018-06-12 RX ADMIN — ACETAMINOPHEN 975 MG: 325 TABLET, FILM COATED ORAL at 07:05

## 2018-06-12 RX ADMIN — CEFAZOLIN SODIUM 1 G: 1 INJECTION, POWDER, FOR SOLUTION INTRAMUSCULAR; INTRAVENOUS at 07:03

## 2018-06-12 RX ADMIN — ONDANSETRON 4 MG: 4 TABLET, ORALLY DISINTEGRATING ORAL at 05:26

## 2018-06-12 RX ADMIN — CEFAZOLIN SODIUM 1 G: 1 INJECTION, POWDER, FOR SOLUTION INTRAMUSCULAR; INTRAVENOUS at 14:11

## 2018-06-12 ASSESSMENT — PAIN DESCRIPTION - DESCRIPTORS
DESCRIPTORS: CONSTANT;ACHING
DESCRIPTORS: BURNING;ACHING
DESCRIPTORS: ACHING;BURNING
DESCRIPTORS: ACHING;SHARP

## 2018-06-12 NOTE — PLAN OF CARE
Problem: Patient Care Overview  Goal: Plan of Care/Patient Progress Review  Outcome: Improving  BP 90s/40s but within parameters for pt, keshia to 50s but within parameters, AOVSS on RA. Pain managed with scheduled tylenol, prn oxycodone & prn IV dilaudid for more severe pain x1 this shift. Regular diet, had ice chips overnight, denies nausea (scop patch behind right ear). Bilateral surgical incision with ace bandage wrapped around torso, CDI - can loosen the bandage if pt prefers. Bilat JPs with bright red/serosanguineous drainage, cont to reinforce drain care/management at home. Voids adequate, up with assist of 1. Denies passing gas. Spouse at bedside & supportive. Plan: cont post-op education, pain management, possible discharge today 6/12    Pt c/o lightheadedness @approx 0520 in bed, bp checked 97/60 & AOVSS for patient. Notified MD who came to assess pt and stated OK for now, cont to monitor. Notified next RN.

## 2018-06-12 NOTE — PROGRESS NOTES
"[facility]  SUBJECTIVE  Interval history: Patient reports pain adequate controlled with current analgesics (see below) and nerve block.  Currently rating pain 2/10 at rest and 7/10 with activity.  Patient is tolerating a diet,  denies nausea.  Denies any weakness, paresthesias, circumoral numbness, metallic taste or tinnitus.  Pt reports she will discharge in a few hrs.     Clinically Aligned Pain Assessment (CAPA):  Comfort (How is your pain?): Tolerable with discomfort  Change in Pain (Since your last medication/intervention?): About the same  Pain Control (How are your pain treatments working?):  Partially effective pain control    Medications related to Pain Management (Future)    Start     Dose/Rate Route Frequency Ordered Stop    06/15/18 0000  lidocaine 1 % 1 mL      1 mL Other EVERY 1 HOUR PRN 06/11/18 1436      06/12/18 0930  oxyCODONE IR (ROXICODONE) tablet 5-10 mg      5-10 mg Oral EVERY 4 HOURS PRN 06/12/18 0841      06/12/18 0000  acetaminophen (TYLENOL) 325 MG tablet      975 mg Oral EVERY 8 HOURS PRN 06/12/18 0648      06/12/18 0000  oxyCODONE IR (ROXICODONE) 5 MG tablet      5-10 mg Oral EVERY 4 HOURS PRN 06/12/18 0843      06/11/18 1445  acetaminophen (TYLENOL) tablet 975 mg      975 mg Oral EVERY 8 HOURS 06/11/18 1436      06/11/18 1436  bupivacaine liposome (EXPAREL) LONG ACTING injection was administered into the infiltration site to produce postsurgical analgesia. Duration of action is up to 72 hours, and other \"emelyn\" medications should not be given for 96 hours with the exception of the lidocaine 5% patch (LIDODERM) and the lidocaine 10mg in potassium infusions. This entry is for INFORMATION ONLY.       Does not apply CONTINUOUS PRN 06/11/18 1436 06/15/18 1435    06/11/18 1436  HYDROmorphone (DILAUDID) injection 0.2-0.3 mg      0.2-0.3 mg Intravenous EVERY 3 HOURS PRN 06/11/18 1436      06/11/18 1436  lidocaine (LMX4) kit       Topical EVERY 1 HOUR PRN 06/11/18 1436      " "      OBJECTIVE:    BP 97/60 (BP Location: Right arm)  Pulse 70  Temp 97.8  F (36.6  C) (Oral)  Resp 15  Ht 1.676 m (5' 6\")  Wt 52.1 kg (114 lb 13.8 oz)  SpO2 100%  BMI 18.54 kg/m2     Exam:  General: alert, no distress and cooperative  Neuro: Strength 5/5     ASSESSMENT/PLAN:    Ilda Hernandez is a 28 year old female with BRCA positive, now POD #1 s/p  MASTECTOMY, NIPPLE SPARING  RECONSTRUCT BREAST BILATERAL with single shot injection bilateral pectoral (PECS) nerve block.  Total bupivacaine 0.25% with epinephrine 1:200,000 20mL total and liposomal (long-acting) bupivacaine (Exparel) 1.3% 20mL total administered 6/11/18 for postop pain control.  Pt is ambulating without difficulty.  No evidence of adverse side effects associated with B/L nerve block injections.  Pt acheiving adequate pain control, with nerve block, oral and IV analgesics.  Anticipate 48-72 hours of pain control with long-acting local anesthetic.  Pt will continue to require multimodal analgesia for visceral/muscle/musculoskeletal pain not controlled with long-acting local anesthetic.      - NO other local anesthetic use within 96 hours of liposomal bupivacaine (Exparel), unless approved by [facility]  - patient received verbal and written instructions about liposomal bupivacaine and counseling about pharmacologic and nonpharmacologic measures for acute postoperative pain management  - please call [facility] if questions or concerns    GIULIANO Leigh CNP  [facility] ([facility])  6/12/2018 1:44 PM    [facility] Contact Info (for in-house use only):  Job code ID: [id number]   [facility] 0599  [facility] 0602  [place] phone: dial [phone], enter jobcode ID, then enter call-back number.    Text: Use [employer] on the Intranet <Paging/Directory> tab and enter Jobcode ID.   If no call back at any time, contact the hospital  and ask for [facility] attending or backup   "

## 2018-06-12 NOTE — PROGRESS NOTES
Surgical Oncology Progress Note    Interval History:  No acute events overnight. Burning axillary pain, overall pain controlled. Tolerating diet without nausea.     Physical Exam:   Temp:  [96.1  F (35.6  C)-98.2  F (36.8  C)] 97.8  F (36.6  C)  Pulse:  [67-72] 70  Heart Rate:  [56-90] 56  Resp:  [9-22] 15  BP: ()/(47-77) 97/60  SpO2:  [95 %-100 %] 100 %  General: Alert, oriented, appears comfortable, NAD.  Respiratory: breathing non labored  Chest: bilateral incisions are clean, dry, and intact. No sign of hematoma.     Data:   All laboratory and imaging data in the past 24 hours reviewed  I/O last 3 completed shifts:  In: 3327.5 [P.O.:240; I.V.:3087.5]  Out: 1555 [Urine:1305; Drains:180; Blood:70]    Assessment and Plan:     Ilda Hernandez is a 28 year old female POD 1 s/p bilateral prophylactic skin sparing mastectomies with immediate reconstruction.     - Regular diet  - Discontinue IVF  - MARK drain teaching  - Keflex  - Discharge home today    Seen with Chief resident who will discuss with Staff.     Ursula Horan PA-C   Surgical Oncology

## 2018-06-12 NOTE — DISCHARGE SUMMARY
[facility] Discharge Summary    Ilda Hernandez MRN# [id number]   Age: 28 year old YOB: 1990     Date of Admission:  6/11/2018  Date of Discharge::  6/12/2018  Admitting Physician:  Jose Daniel Henderson MD  Discharge Physician:  Jose Daniel Henderson MD     PCP:  Lisa Liao Mai    Disposition: Patient discharged to home in stable condition.    Admission Diagnosis:  Past Medical History:   Diagnosis Date     BRCA1 positive 11/23/2011    [id number], identified using site specific testing through [facility]ii     PONV (postoperative nausea and vomiting)        Discharge Diagnosis:  Patient Active Problem List   Diagnosis     BRCA1 positive     BRCA1 gene mutation positive       Discharge medications  Current Discharge Medication List      START taking these medications    Details   acetaminophen (TYLENOL) 325 MG tablet Take 3 tablets (975 mg) by mouth every 8 hours as needed for mild pain  Qty: 50 tablet, Refills: 0    Associated Diagnoses: Acute post-operative pain      cephALEXin (KEFLEX) 500 MG capsule Take 1 capsule (500 mg) by mouth 2 times daily for 7 days  Qty: 14 capsule, Refills: 0    Associated Diagnoses: BRCA1 positive      oxyCODONE IR (ROXICODONE) 5 MG tablet Take 1-2 tablets (5-10 mg) by mouth every 6 hours as needed for other (pain control or improvement in physical function. Hold dose for analgesic side effects.)  Qty: 30 tablet, Refills: 0    Associated Diagnoses: Acute post-operative pain         CONTINUE these medications which have NOT CHANGED    Details   Ascorbic Acid (VITAMIN C GUMMIE PO) Take 2 chew tab by mouth      Homeopathic Products (ZICAM ALLERGY RELIEF NA)       UNABLE TO FIND 2 chew tab MEDICATION NAME: Naturemade hair, skin and nails.           Follow up, Special Instructions:  After Care     Future Labs/Procedures    Dressing     Comments:    From Dr. Grace  Dress the drain sites daily with cotton gauze. It is important to not get the drain exit  sites wet.  Beginning on Thursday, the surgical incisions may be left open to air.  You may apply a simple cotton gauze dressing to the incisions if there is drainage, or if you feel more comfortable doing so.  It is OK to shower and get the surgical incisions wet.  These areas are safe from infection 48 hours after surgery  Strip and empty and reapply suction to the drains as needed.    Record the drain output and bring the record to your office appointments.          Procedures:  6/11/18      Bilateral skin-sparing mastectomies  Dr. Grace   Bilateral breast reconstruction with tissue expander placement and acellular dermal matrix reinforcement      Consultations:  None    Brief HPI:  Ilda Hernandez is a 28-year-old woman who has a strong family history of breast cancer.  She was found to have a BRCA1 mutation.  She has elected to undergo bilateral skin-sparing mastectomies.  She declined nipple-sparing mastectomies.  She wanted immediate reconstruction.     Hospital Course:  The patient was admitted and underwent the above procedure. The patient tolerated the procedure well. The patient recovered well with no post-operative complications. Prior to discharge pain was controlled with oral pain medication and the patient was able to ambulate and void without difficulty. The patient received appropriate education post operatively including MARK drain teaching. On POD #1 the patient was discharged to home.    Surgical pathology  Pending     Ursula Horan PA-C

## 2018-06-12 NOTE — PLAN OF CARE
Problem: Patient Care Overview  Goal: Plan of Care/Patient Progress Review  Outcome: Therapy, progress toward functional goals as expected  Pt. Amb. With SBA. Complain of occ. Dizziness, easily resolved. Scolpalamine patch intact, denies nausea. Fide. Reg. Diet with fair appetite. Breast inc. Covered and ace intact. No drainage, good capillary return and warm. Denies numbness to area. Steve. MARK's  With mod. Amount of drainage, stripped and emptied, demonstrated to  and cares with spouse and sister completed. Voiding in good amount. Oxy. And Tyl for pain. Pt. Ready for discharge now.

## 2018-06-12 NOTE — PLAN OF CARE
Problem: Patient Care Overview  Goal: Plan of Care/Patient Progress Review  Outcome: Therapy, progress toward functional goals as expected  POD #0 Bilateral mastectomy with bilateral breast reconstruction with tissue expander placement. Up to the BR with assist of 1-2. Voiding adequately. Bilateral MARK dressing UTV. Bilateral surgical incision with ace bandage applied CDI. May loosen up the bandage if needed per pt preference. Post op VS taken per unit protocol and stable, O2 stable at RA. Teaching regarding MARK care and management at home started. Hand outs and supplies given to pt and spouse. IS teaching done. On regular diet, fair appetite. Prn zofran given once for nausea with relief, scopolamine patch right ear. Prn po oxycodone given for some discomfort to the bilateral breast surgical incision with some relief. MIVF at 75 cc/hr. Voiding spontaneously with adequate urine volume. PLAN: continue to monitor post op status. Monitor for pain and drain management reinforcement. Possible discharge tomorrow.

## 2018-06-15 ENCOUNTER — NURSE TRIAGE (OUTPATIENT)
Dept: NURSING | Facility: CLINIC | Age: 28
End: 2018-06-15

## 2018-06-16 LAB — COPATH REPORT: NORMAL

## 2018-06-16 NOTE — TELEPHONE ENCOUNTER
"Caller: Saurabh,  and self  Reason for call: \"she had surgery on Monday, had iodine, and developed a red rash in that area, what else besides Benadryl can we do for it?\" Reports s/p double mastectomy with reconstruction on 6/11/18.   Symptoms: red rash on chest/sides where betadine/iodine applied, raised - not rough, mild itching, no hives, blister x 1. Reports some \"chest heaviness from the surgery\".   Symptoms started post op, rash worsening since Monday.  Denies fever, SOB, chest pain, trouble swallowing, or throat pain.    Home cares tried: washing off the area, oral Benadryl, 25 mg x 1 today (not helping)  Care advice given per triage guideline; advised caller to speak to on call surgeon now, for 2nd level triage. Caller verbalized understanding of care advice given and plans to speak to on call provider.  This nurse paged the on call provider (surgical oncologist on call) for the [facility] clinic at 9:13pm via [facility] [employer]  to call the patient back directly at [phone] for 2nd level triage. Caller advised to call [facility] back if no call from provider within 20-30 minutes. Encouraged call back to FNA 24/7 for nurse line services, new/worsening symptoms or further questions.    Isamar Solorio RN  [facility]      Reason for Disposition    Caller has URGENT question and triager unable to answer question     Red rash in surgical prep area worsening    Additional Information    Negative: Sounds like a life-threatening emergency to the triager    Negative: Chest pain    Negative: Difficulty breathing    Negative: Surgical incision symptoms and questions    Negative: [1] Discomfort (pain, burning or stinging) when passing urine AND [2] male    Negative: [1] Discomfort (pain, burning or stinging) when passing urine AND [2] female    Negative: Constipation    Negative: Calf pain    Negative: Dizziness is severe, or persists > 24 hours after surgery    Negative: Pain, redness, " swelling, or pus at IV Site    Negative: Symptoms arising from use of a urinary catheter (Flores or Coude)    Negative: Cast problems or questions    Negative: Medication question    Negative: [1] Widespread rash AND [2] bright red, sunburn-like    Negative: [1] SEVERE headache AND [2] after spinal (epidural) anesthesia    Negative: [1] Vomiting AND [2] persists > 4 hours    Negative: [1] Vomiting AND [2] abdomen looks much more swollen than usual    Negative: [1] Drinking very little AND [2] dehydration suspected (e.g., no urine > 12 hours, very dry mouth, very lightheaded)    Negative: Patient sounds very sick or weak to the triager    Negative: Sounds like a serious complication to the triager    Negative: Fever > 100.5 F (38.1 C)    Negative: [1] SEVERE post-op pain (e.g., excruciating, pain scale 8-10) AND [2] not controlled with pain medications    Protocols used: [facility]

## 2018-06-25 ASSESSMENT — ENCOUNTER SYMPTOMS
NAIL CHANGES: 0
SKIN CHANGES: 0
POOR WOUND HEALING: 0

## 2018-06-29 ENCOUNTER — OFFICE VISIT (OUTPATIENT)
Dept: ONCOLOGY | Facility: CLINIC | Age: 28
End: 2018-06-29
Attending: SURGERY
Payer: COMMERCIAL

## 2018-06-29 VITALS
WEIGHT: 116.19 LBS | DIASTOLIC BLOOD PRESSURE: 68 MMHG | TEMPERATURE: 97 F | OXYGEN SATURATION: 100 % | HEIGHT: 66 IN | SYSTOLIC BLOOD PRESSURE: 105 MMHG | BODY MASS INDEX: 18.67 KG/M2 | RESPIRATION RATE: 16 BRPM | HEART RATE: 88 BPM

## 2018-06-29 DIAGNOSIS — Z15.09 BRCA1 POSITIVE: Primary | ICD-10-CM

## 2018-06-29 DIAGNOSIS — Z15.01 BRCA1 POSITIVE: Primary | ICD-10-CM

## 2018-06-29 PROCEDURE — G0463 HOSPITAL OUTPT CLINIC VISIT: HCPCS | Mod: ZF

## 2018-06-29 ASSESSMENT — PAIN SCALES - GENERAL: PAINLEVEL: MILD PAIN (2)

## 2018-06-29 NOTE — LETTER
6/29/2018       RE: Ilda Hernandez  [address]  [place] [address]     Dear Colleague,    Thank you for referring your patient, Ilda Hernandez, to the [facility] at [facility]. Please see a copy of my visit note below.    HISTORY OF PRESENT ILLNESS:  Ilda Hernandez is here for a postoperative visit after undergoing bilateral skin-sparing mastectomies for a BRCA1 mutation.  She has done well since her surgery with no postoperative complications.  Dr. Grace did her tissue expanders and he has removed her drains.  He is planning to do a second stage next month.  Her final pathology report revealed no evidence of malignancy or premalignancy.      PHYSICAL EXAMINATION:  Her incisions are healing very well.  Her skin looks good.  She does have old ecchymosis in the left breast.        She showed me her cell phone today of a picture of her skin shortly after surgery with a lot of redness at the site of the surgical preparation.  I think she has a Hibiclens allergy and will note this in her record.  She is going to follow up with Dianne William to further consider her ovarian cancer risk.  I will see her in the future if any problems arise.         Again, thank you for allowing me to participate in the care of your patient.      Sincerely,    Jose Daniel Henderson MD

## 2018-06-29 NOTE — NURSING NOTE
"Oncology Rooming Note    June 29, 2018 9:01 AM   Ilda Hernandez is a 28 year old female who presents for:    Chief Complaint   Patient presents with     Oncology Clinic Visit     Return: Post Op     Initial Vitals: /68  Pulse 88  Temp 97  F (36.1  C) (Oral)  Resp 16  Ht 1.676 m (5' 6\")  Wt 52.7 kg (116 lb 3 oz)  LMP 06/18/2018 (Exact Date)  SpO2 100%  Breastfeeding? No  BMI 18.75 kg/m2 Estimated body mass index is 18.75 kg/(m^2) as calculated from the following:    Height as of this encounter: 1.676 m (5' 6\").    Weight as of this encounter: 52.7 kg (116 lb 3 oz). Body surface area is 1.57 meters squared.  Mild Pain (2) Comment: tissue expanders   Patient's last menstrual period was 06/18/2018 (exact date).  Allergies reviewed: Yes  Medications reviewed: Yes    Medications: Medication refills not needed today.  Pharmacy name entered into [facility]: [facility] - [place], [place] - [address] [place] [address]    Clinical concerns: new concerns are that she needs to make sure that the scrub that they used during her surgery is not used during her upcoming surgery as she had a pretty intense skin reaction to it. Dr. Henderson  was notified.    10 minutes for nursing intake (face to face time)     Jovana Bedoya CMA              "

## 2018-06-29 NOTE — PROGRESS NOTES
HISTORY OF PRESENT ILLNESS:  Ilda Hernandez is here for a postoperative visit after undergoing bilateral skin-sparing mastectomies for a BRCA1 mutation.  She has done well since her surgery with no postoperative complications.  Dr. Grace did her tissue expanders and he has removed her drains.  He is planning to do a second stage next month.  Her final pathology report revealed no evidence of malignancy or premalignancy.      PHYSICAL EXAMINATION:  Her incisions are healing very well.  Her skin looks good.  She does have old ecchymosis in the left breast.        She showed me her cell phone today of a picture of her skin shortly after surgery with a lot of redness at the site of the surgical preparation.  I think she has a Hibiclens allergy and will note this in her record.  She is going to follow up with Dianne William to further consider her ovarian cancer risk.  I will see her in the future if any problems arise.

## 2018-07-13 NOTE — PROGRESS NOTES
[facility]  [address]  [address]  [place] [address] [phone]  Dept: [phone]    PRE-OP EVALUATION:  Today's date: 2018    Ilda Hernandez (: 1990) presents for pre-operative evaluation assessment as requested by Dr. Grace.  She requires evaluation and anesthesia risk assessment prior to undergoing surgery/procedure for treatment of expander to implant .    Fax number for surgical facility: [phone]  Primary Physician: Lisa Liao Mai  Type of Anesthesia Anticipated: General    Patient has a Health Care Directive or Living Will:  NO    Preop Questions 2018   Who is doing your surgery? nils grace   What are you having done? expander to implant exchange   Date of Surgery/Procedure:    Facility or Hospital where procedure/surgery will be performed: [facility]   1.  Do you have a history of Heart attack, stroke, stent, coronary bypass surgery, or other heart surgery? No   2.  Do you ever have any pain or discomfort in your chest? No   3.  Do you have a history of  Heart Failure? No   4.   Are you troubled by shortness of breath when:  walking on a level surface, or up a slight hill, or at night? No   5.  Do you currently have a cold, bronchitis or other respiratory infection? No   6.  Do you have a cough, shortness of breath, or wheezing? No   7.  Do you sometimes get pains in the calves of your legs when you walk? No   8. Do you or anyone in your family have previous history of blood clots? No   9.  Do you or does anyone in your family have a serious bleeding problem such as prolonged bleeding following surgeries or cuts? No   10. Have you ever had problems with anemia or been told to take iron pills? No   11. Have you had any abnormal blood loss such as black, tarry or bloody stools, or abnormal vaginal bleeding? No   12. Have you ever had a blood transfusion? No   13. Have you or any of your relatives ever had problems with  anesthesia? No   14. Do you have sleep apnea, excessive snoring or daytime drowsiness? No   15. Do you have any prosthetic heart valves? No   16. Do you have prosthetic joints? No   17. Is there any chance that you may be pregnant? No         HPI:     HPI related to upcoming procedure: BRCA1 gene s/p bilateral mastectomy, now for breast reconstructive surgery.    Otherwise no significant PMH.    MEDICAL HISTORY:     Patient Active Problem List    Diagnosis Date Noted     BRCA1 gene mutation positive 06/11/2018     Priority: Medium     BRCA1 positive 01/01/2012     Priority: Medium     Genetic Testing:  Reports BRCA1+, records requested from [facility] (under the name of Ilda Redman).     Pertinent history:  January 2012 - Breast biopsy for lump in L breast, pathology showed fibroadenoma.     Pertinent screening history:  8/17/2014 - Breast MRI ([facility]), BiRads3, stable 12 o'clock area of R breast, dense breasts noted.  12/22/2014 - B Ultrasound, BiRads2  8/18/2015 - Breast MRI, stable, BiRads1  6/23/2016 - Breast MRI and B ultrasound, negative ([facility])  6/20/2017 - Breast MRI ([facility]), heterogeneously dense breasts noted; focal area of non mass enhancement in extreme lower slightly outer area of R breast; targeted US recommended.        Past Medical History:   Diagnosis Date     BRCA1 positive 11/23/2011    [id number], identified using site specific testing through [facility] cliic     PONV (postoperative nausea and vomiting)      Past Surgical History:   Procedure Laterality Date     BREAST LUMPECTOMY, RT/LT Left 01/2012    fibroadenoma     MASTECTOMY, NIPPLE SPARING Bilateral 6/11/2018    Procedure: MASTECTOMY, NIPPLE SPARING;  Bilateral Non Nipple Sparing Mastectomy with Bilateral Breast Reconstruction with Tisssue Expanders and Alloderm, Anesthesia Block;  Surgeon: Jose Daniel Henderson MD;  Location: [facility] OR     RECONSTRUCT BREAST BILATERAL Bilateral 6/11/2018     Procedure: RECONSTRUCT BREAST BILATERAL;;  Surgeon: Mahamed Grace MD;  Location: [facility] OR     Current Outpatient Prescriptions   Medication Sig Dispense Refill     Homeopathic Products (ZICAM ALLERGY RELIEF NA)        UNABLE TO FIND 2 chew tab MEDICATION NAME: Naturemade hair, skin and nails.       acetaminophen (TYLENOL) 325 MG tablet Take 3 tablets (975 mg) by mouth every 8 hours as needed for mild pain (Patient not taking: Reported on 6/29/2018) 50 tablet 0     Ascorbic Acid (VITAMIN C GUMMIE PO) Take 2 chew tab by mouth       OTC products: none    No Known Allergies   Latex Allergy: NO    Social History   Substance Use Topics     Smoking status: Never Smoker     Smokeless tobacco: Never Used     Alcohol use Yes      Comment: maybe 1/2 to 1 per week     History   Drug Use No       REVIEW OF SYSTEMS:   Constitutional, neuro, ENT, endocrine, pulmonary, cardiac, gastrointestinal, genitourinary, musculoskeletal, integument and psychiatric systems are negative, except as otherwise noted.    EXAM:   /60 (BP Location: Right arm, Patient Position: Chair, Cuff Size: Adult Regular)  Pulse 82  Temp 98.1  F (36.7  C) (Oral)  Wt 116 lb 11.2 oz (52.9 kg)  SpO2 99%  BMI 18.84 kg/m2    GENERAL APPEARANCE: healthy, alert and no distress     EYES: EOMI, PERRL     HENT: ear canals and TM's normal and nose and mouth without ulcers or lesions     NECK: no adenopathy, no asymmetry, masses, or scars and thyroid normal to palpation     RESP: lungs clear to auscultation - no rales, rhonchi or wheezes     CV: regular rates and rhythm, normal S1 S2, no S3 or S4 and no murmur, click or rub     ABDOMEN:  soft, nontender, no HSM or masses and bowel sounds normal     MS: extremities normal- no gross deformities noted, no evidence of inflammation in joints, FROM in all extremities.     SKIN: no suspicious lesions or rashes     NEURO: Normal strength and tone, sensory exam grossly normal, mentation intact and speech normal      PSYCH: mentation appears normal. and affect normal/bright     LYMPHATICS: No cervical adenopathy    DIAGNOSTICS:   EKG: Not indicated due to non-vascular surgery and low risk of event (age <65 and without cardiac risk factors)    Recent Labs   Lab Test  03/12/18   1644   HGB  12.4   PLT  226        IMPRESSION:     1. Preop general physical exam  Healthy, young female with no significant PMH and excellent METS.    2. BRCA1 gene mutation positive  Pending reconstructive surgery s/p bilateral mastectomy.      The proposed surgical procedure is considered LOW risk.    REVISED CARDIAC RISK INDEX  The patient has the following serious cardiovascular risks for perioperative complications such as (MI, PE, VFib and 3  AV Block):  No serious cardiac risks  INTERPRETATION: 1 risks: Class II (low risk - 0.9% complication rate) - based solely on risk of surgery, not patient's risk factors.    The patient has the following additional risks for perioperative complications:  No identified additional risks      ICD-10-CM    1. Preop general physical exam Z01.818        RECOMMENDATIONS:       --Patient is on no chronic medications    APPROVAL GIVEN to proceed with proposed procedure, without further diagnostic evaluation       Signed Electronically by: Wilder Liao MD    Copy of this evaluation report is provided to requesting physician.    [place] Preop Guidelines    Revised Cardiac Risk Index

## 2018-07-20 ENCOUNTER — OFFICE VISIT (OUTPATIENT)
Dept: PEDIATRICS | Facility: CLINIC | Age: 28
End: 2018-07-20
Payer: COMMERCIAL

## 2018-07-20 VITALS
OXYGEN SATURATION: 99 % | DIASTOLIC BLOOD PRESSURE: 60 MMHG | HEART RATE: 82 BPM | SYSTOLIC BLOOD PRESSURE: 106 MMHG | TEMPERATURE: 98.1 F | WEIGHT: 116.7 LBS | BODY MASS INDEX: 18.84 KG/M2

## 2018-07-20 DIAGNOSIS — Z01.818 PREOP GENERAL PHYSICAL EXAM: Primary | ICD-10-CM

## 2018-07-20 DIAGNOSIS — Z15.09 BRCA1 GENE MUTATION POSITIVE: ICD-10-CM

## 2018-07-20 DIAGNOSIS — Z15.01 BRCA1 GENE MUTATION POSITIVE: ICD-10-CM

## 2018-07-20 PROCEDURE — 99214 OFFICE O/P EST MOD 30 MIN: CPT | Performed by: INTERNAL MEDICINE

## 2018-07-20 NOTE — MR AVS SNAPSHOT
After Visit Summary   7/20/2018    Ilda Hernandez    MRN: [id number]           Patient Information     Date Of Birth          1990        Visit Information        Provider Department      7/20/2018 10:10 AM Lisa Liao Mai, MD [facility]        Today's Diagnoses     Preop general physical exam    -  1      Care Instructions      Before Your Surgery      Call your surgeon if there is any change in your health. This includes signs of a cold or flu (such as a sore throat, runny nose, cough, rash or fever).    Do not smoke, drink alcohol or take over the counter medicine (unless your surgeon or primary care doctor tells you to) for the 24 hours before and after surgery.    If you take prescribed drugs: Follow your doctor s orders about which medicines to take and which to stop until after surgery.    Eating and drinking prior to surgery: follow the instructions from your surgeon    Take a shower or bath the night before surgery. Use the soap your surgeon gave you to gently clean your skin. If you do not have soap from your surgeon, use your regular soap. Do not shave or scrub the surgery site.  Wear clean pajamas and have clean sheets on your bed.           Follow-ups after your visit        Follow-up notes from your care team     Return in about 1 year (around 7/20/2019), or as needed.      Who to contact     If you have questions or need follow up information about today's clinic visit or your schedule please contact [facility] directly at [phone].  Normal or non-critical lab and imaging results will be communicated to you by MyChart, letter or phone within 4 business days after the clinic has received the results. If you do not hear from us within 7 days, please contact the clinic through MyChart or phone. If you have a critical or abnormal lab result, we will notify you by phone as soon as possible.  Submit refill requests through [employer] or call your pharmacy and  they will forward the refill request to us. Please allow 3 business days for your refill to be completed.          Additional Information About Your Visit        [employer] Information     [employer] gives you secure access to your electronic health record. If you see a primary care provider, you can also send messages to your care team and make appointments. If you have questions, please call your primary care clinic.  If you do not have a primary care provider, please call [phone] and they will assist you.        Care EveryWhere ID     This is your Care EveryWhere ID. This could be used by other organizations to access your [facility] medical records  [id number]        Your Vitals Were     Pulse Temperature Pulse Oximetry BMI (Body Mass Index)          82 98.1  F (36.7  C) (Oral) 99% 18.84 kg/m2         Blood Pressure from Last 3 Encounters:   07/20/18 106/60   06/29/18 105/68   06/12/18 97/60    Weight from Last 3 Encounters:   07/20/18 116 lb 11.2 oz (52.9 kg)   06/29/18 116 lb 3 oz (52.7 kg)   06/11/18 114 lb 13.8 oz (52.1 kg)              Today, you had the following     No orders found for display       Primary Care Provider Office Phone # Fax #    Lisa Liao [phone] [address] [place] [address]        Equal Access to Services     [facility]: Hadii aad ku hadasho Soomaali, waaxda luqadaha, qaybta kaalmada krysda, radha perez. So [facility] [phone].    ATENCIÓN: Si habla español, tiene a reilly disposición servicios gratuitos de asistencia lingüística. Llame al [phone].    We comply with applicable federal civil rights laws and [place] laws. We do not discriminate on the basis of race, color, national origin, age, disability, sex, sexual orientation, or gender identity.            Thank you!     Thank you for choosing [facility]  for your care. Our goal is always to provide you with excellent care. Hearing back  from our patients is one way we can continue to improve our services. Please take a few minutes to complete the written survey that you may receive in the mail after your visit with us. Thank you!             Your Updated Medication List - Protect others around you: Learn how to safely use, store and throw away your medicines at www.disposemymeds.org.          This list is accurate as of 7/20/18 10:39 AM.  Always use your most recent med list.                   Brand Name Dispense Instructions for use Diagnosis    acetaminophen 325 MG tablet    TYLENOL    50 tablet    Take 3 tablets (975 mg) by mouth every 8 hours as needed for mild pain    Acute post-operative pain       UNABLE TO FIND      2 chew tab MEDICATION NAME: Naturemade hair, skin and nails.        VITAMIN C GUMMIE PO      Take 2 chew tab by mouth        ZICAM ALLERGY RELIEF NA

## 2018-11-14 ENCOUNTER — OFFICE VISIT (OUTPATIENT)
Dept: FAMILY MEDICINE | Facility: CLINIC | Age: 28
End: 2018-11-14
Payer: COMMERCIAL

## 2018-11-14 VITALS
OXYGEN SATURATION: 100 % | TEMPERATURE: 98.1 F | HEIGHT: 65 IN | WEIGHT: 115.3 LBS | BODY MASS INDEX: 19.21 KG/M2 | DIASTOLIC BLOOD PRESSURE: 68 MMHG | SYSTOLIC BLOOD PRESSURE: 120 MMHG | HEART RATE: 51 BPM

## 2018-11-14 DIAGNOSIS — Z01.818 PREOP GENERAL PHYSICAL EXAM: Primary | ICD-10-CM

## 2018-11-14 PROCEDURE — 99214 OFFICE O/P EST MOD 30 MIN: CPT | Performed by: FAMILY MEDICINE

## 2018-11-14 NOTE — PROGRESS NOTES
[facility]  [address] [id number] [phone]  Dept: [phone]    PRE-OP EVALUATION:  Today's date: 2018    Ilda Hernandez (: 1990) presents for pre-operative evaluation assessment as requested by Dr. Grace.  She requires evaluation and anesthesia risk assessment prior to undergoing surgery/procedure for treatment of fat transfer to fill in breasts implant exchange .    Fax number for surgical facility: [facility]- [phone]  Primary Physician: Lisa Liao Mai  Type of Anesthesia Anticipated: General    Patient has a Health Care Directive or Living Will:  NO    Preop Questions 2018   Who is doing your surgery? Mahamed Grace   What are you having done? Fat transfer to fill rippling in breasts, implant exchange    Date of Surgery/Procedure: 2018   Facility or Hospital where procedure/surgery will be performed: [facility]   1.  Do you have a history of Heart attack, stroke, stent, coronary bypass surgery, or other heart surgery? No   2.  Do you ever have any pain or discomfort in your chest? No   3.  Do you have a history of  Heart Failure? No   4.   Are you troubled by shortness of breath when:  walking on a level surface, or up a slight hill, or at night? No   5.  Do you currently have a cold, bronchitis or other respiratory infection? No   6.  Do you have a cough, shortness of breath, or wheezing? No   7.  Do you sometimes get pains in the calves of your legs when you walk? No   8. Do you or anyone in your family have previous history of blood clots? No   9.  Do you or does anyone in your family have a serious bleeding problem such as prolonged bleeding following surgeries or cuts? No   10. Have you ever had problems with anemia or been told to take iron pills? No   11. Have you had any abnormal blood loss such as black, tarry or bloody stools, or abnormal vaginal bleeding? No   12. Have you ever had a blood  transfusion? No   13. Have you or any of your relatives ever had problems with anesthesia? No   14. Do you have sleep apnea, excessive snoring or daytime drowsiness? No   15. Do you have any prosthetic heart valves? No   16. Do you have prosthetic joints? No   17. Is there any chance that you may be pregnant? No         HPI:     HPI related to upcoming procedure: s/p bilateral mastectomy due to BRCA 1 gene. Had reconstructive surgery in July but now needs revision of implants/ exchange.       See problem list for active medical problems.  Problems all longstanding and stable, except as noted/documented.  See ROS for pertinent symptoms related to these conditions.                                                                                                                                                          .    MEDICAL HISTORY:     Patient Active Problem List    Diagnosis Date Noted     BRCA1 gene mutation positive 06/11/2018     Priority: Medium     BRCA1 positive 01/01/2012     Priority: Medium     Genetic Testing:  Reports BRCA1+, records requested from [facility] (under the name of Ilda Redman).     Pertinent history:  January 2012 - Breast biopsy for lump in L breast, pathology showed fibroadenoma.     Pertinent screening history:  8/17/2014 - Breast MRI ([facility]), BiRads3, stable 12 o'clock area of R breast, dense breasts noted.  12/22/2014 - B Ultrasound, BiRads2  8/18/2015 - Breast MRI, stable, BiRads1  6/23/2016 - Breast MRI and B ultrasound, negative ([facility])  6/20/2017 - Breast MRI ([facility]), heterogeneously dense breasts noted; focal area of non mass enhancement in extreme lower slightly outer area of R breast; targeted US recommended.        Past Medical History:   Diagnosis Date     BRCA1 positive 11/23/2011    [id number], identified using site specific testing through [facility]     PONV (postoperative nausea and vomiting)      Past  "Surgical History:   Procedure Laterality Date     BREAST LUMPECTOMY, RT/LT Left 01/2012    fibroadenoma     MASTECTOMY, NIPPLE SPARING Bilateral 6/11/2018    Procedure: MASTECTOMY, NIPPLE SPARING;  Bilateral Non Nipple Sparing Mastectomy with Bilateral Breast Reconstruction with Tisssue Expanders and Alloderm, Anesthesia Block;  Surgeon: Jose Daniel Henderson MD;  Location: [facility] OR     RECONSTRUCT BREAST BILATERAL Bilateral 6/11/2018    Procedure: RECONSTRUCT BREAST BILATERAL;;  Surgeon: Mahamed Grace MD;  Location: [facility] OR     Current Outpatient Prescriptions   Medication Sig Dispense Refill     acetaminophen (TYLENOL) 325 MG tablet Take 3 tablets (975 mg) by mouth every 8 hours as needed for mild pain 50 tablet 0     Ascorbic Acid (VITAMIN C GUMMIE PO) Take 2 chew tab by mouth       Homeopathic Products (ZICAM ALLERGY RELIEF NA)        UNABLE TO FIND 2 chew tab MEDICATION NAME: Naturemade hair, skin and nails.       OTC products: no recent use of OTC ASA, NSAIDS or Steroids    No Known Allergies   Latex Allergy: NO    Social History   Substance Use Topics     Smoking status: Never Smoker     Smokeless tobacco: Never Used     Alcohol use Yes      Comment: maybe 1/2 to 1 per week     History   Drug Use No       REVIEW OF SYSTEMS:   Constitutional, neuro, ENT, endocrine, pulmonary, cardiac, gastrointestinal, genitourinary, musculoskeletal, integument and psychiatric systems are negative, except as otherwise noted.    EXAM:   /68 (BP Location: Right arm, Patient Position: Sitting, Cuff Size: Adult Small)  Pulse 51  Temp 98.1  F (36.7  C) (Oral)  Ht 5' 5.47\" (1.663 m)  Wt 115 lb 4.8 oz (52.3 kg)  LMP 11/07/2018 (Approximate)  SpO2 100%  Breastfeeding? No  BMI 18.91 kg/m2    GENERAL APPEARANCE: healthy, alert and no distress     EYES: EOMI, PERRL     HENT: ear canals and TM's normal and nose and mouth without ulcers or lesions     NECK: no adenopathy, no asymmetry, masses, or scars and thyroid normal to " palpation     RESP: lungs clear to auscultation - no rales, rhonchi or wheezes     CV: regular rates and rhythm, normal S1 S2     ABDOMEN:  soft, nontender,bowel sounds normal     MS: extremities normal- no gross deformities noted, no evidence of inflammation in joints, FROM in all extremities.     SKIN: no suspicious lesions or rashes     NEURO: Normal strength and tone, sensory exam grossly normal, mentation intact and speech normal     PSYCH: mentation appears normal. and affect normal/bright     LYMPHATICS: No cervical adenopathy    DIAGNOSTICS:   No labs or EKG required for low risk surgery (cataract, skin procedure, breast biopsy, etc)    Recent Labs   Lab Test  03/12/18   1644   HGB  12.4   PLT  226        IMPRESSION:   Reason for surgery/procedure: s/p mastectomy/ breast implants revision  Diagnosis/reason for consult: Pre-operative consult     The proposed surgical procedure is considered LOW risk.    REVISED CARDIAC RISK INDEX  The patient has the following serious cardiovascular risks for perioperative complications such as (MI, PE, VFib and 3  AV Block):  No serious cardiac risks  INTERPRETATION: 0 risks: Class I (very low risk - 0.4% complication rate)    The patient has the following additional risks for perioperative complications:  No identified additional risks      ICD-10-CM    1. Preop general physical exam Z01.818        RECOMMENDATIONS:         APPROVAL GIVEN to proceed with proposed procedure, without further diagnostic evaluation       Signed Electronically by: Bonnie Rosas MD    Copy of this evaluation report is provided to requesting physician.    [facility] Preop Guidelines    Revised Cardiac Risk Index

## 2018-11-14 NOTE — MR AVS SNAPSHOT
After Visit Summary   11/14/2018    Ilda Hernandez    MRN: [id number]           Patient Information     Date Of Birth          1990        Visit Information        Provider Department      11/14/2018 4:00 PM Bonnie Rosas MD [facility]        Today's Diagnoses     Preop general physical exam    -  1      Care Instructions      Before Your Surgery      Call your surgeon if there is any change in your health. This includes signs of a cold or flu (such as a sore throat, runny nose, cough, rash or fever).    Do not smoke, drink alcohol or take over the counter medicine (unless your surgeon or primary care doctor tells you to) for the 24 hours before and after surgery.    If you take prescribed drugs: Follow your doctor s orders about which medicines to take and which to stop until after surgery.    Eating and drinking prior to surgery: follow the instructions from your surgeon    Take a shower or bath the night before surgery. Use the soap your surgeon gave you to gently clean your skin. If you do not have soap from your surgeon, use your regular soap. Do not shave or scrub the surgery site.  Wear clean pajamas and have clean sheets on your bed.           Follow-ups after your visit        Follow-up notes from your care team     Return in about 3 months (around 2/14/2019).      Who to contact     If you have questions or need follow up information about today's clinic visit or your schedule please contact [facility] directly at [phone].  Normal or non-critical lab and imaging results will be communicated to you by MyChart, letter or phone within 4 business days after the clinic has received the results. If you do not hear from us within 7 days, please contact the clinic through [employer] or phone. If you have a critical or abnormal lab result, we will notify you by phone as soon as possible.  Submit refill requests through [employer] or call your  "pharmacy and they will forward the refill request to us. Please allow 3 business days for your refill to be completed.          Additional Information About Your Visit        MyChart Information     [employer] gives you secure access to your electronic health record. If you see a primary care provider, you can also send messages to your care team and make appointments. If you have questions, please call your primary care clinic.  If you do not have a primary care provider, please call [phone] and they will assist you.        Care EveryWhere ID     This is your Care EveryWhere ID. This could be used by other organizations to access your [facility] medical records  [id number]        Your Vitals Were     Pulse Temperature Height Last Period Pulse Oximetry Breastfeeding?    51 98.1  F (36.7  C) (Oral) 5' 5.47\" (1.663 m) 11/07/2018 (Approximate) 100% No    BMI (Body Mass Index)                   18.91 kg/m2            Blood Pressure from Last 3 Encounters:   11/14/18 120/68   07/20/18 106/60   06/29/18 105/68    Weight from Last 3 Encounters:   11/14/18 115 lb 4.8 oz (52.3 kg)   07/20/18 116 lb 11.2 oz (52.9 kg)   06/29/18 116 lb 3 oz (52.7 kg)              Today, you had the following     No orders found for display       Primary Care Provider Office Phone # Fax #    Lisa Liao [phone] [address] [place] [address]        Equal Access to Services     [facility]: Hadii trini ku hadasho Soomaali, waaxda luqadaha, qaybta kaalmada adeegyada, waxay aniyah gonzalez . So [facility] [phone].    ATENCIÓN: Si habla español, tiene a reilly disposición servicios gratuitos de asistencia lingüística. Llame al [phone].    We comply with applicable federal civil rights laws and [place] laws. We do not discriminate on the basis of race, color, national origin, age, disability, sex, sexual orientation, or gender identity.            Thank you!     Thank you for " choosing [facility]  for your care. Our goal is always to provide you with excellent care. Hearing back from our patients is one way we can continue to improve our services. Please take a few minutes to complete the written survey that you may receive in the mail after your visit with us. Thank you!             Your Updated Medication List - Protect others around you: Learn how to safely use, store and throw away your medicines at www.disposemymeds.org.          This list is accurate as of 11/14/18  4:45 PM.  Always use your most recent med list.                   Brand Name Dispense Instructions for use Diagnosis    acetaminophen 325 MG tablet    TYLENOL    50 tablet    Take 3 tablets (975 mg) by mouth every 8 hours as needed for mild pain    Acute post-operative pain       UNABLE TO FIND      2 chew tab MEDICATION NAME: Naturemade hair, skin and nails.        VITAMIN C GUMMIE PO      Take 2 chew tab by mouth        ZICAM ALLERGY RELIEF NA

## 2019-01-15 ENCOUNTER — TELEPHONE (OUTPATIENT)
Dept: OBGYN | Facility: CLINIC | Age: 29
End: 2019-01-15

## 2019-01-15 DIAGNOSIS — Z15.09 BRCA1 POSITIVE: Primary | ICD-10-CM

## 2019-01-15 DIAGNOSIS — Z15.01 BRCA1 POSITIVE: Primary | ICD-10-CM

## 2019-01-15 NOTE — TELEPHONE ENCOUNTER
Patient scheduled AE 4/22/19. Pt would like order for her yearly ovarian cancer screen ultrasound and blood work so she can come in prior to the appointment. Routing to BE. Thanks.   Vicky Ferrara, RN-BSN

## 2019-03-03 NOTE — TELEPHONE ENCOUNTER
Talked to [email or web address],  no prior authorization is needed  
ambulatory

## 2019-04-01 ENCOUNTER — ANCILLARY PROCEDURE (OUTPATIENT)
Dept: ULTRASOUND IMAGING | Facility: CLINIC | Age: 29
End: 2019-04-01
Payer: COMMERCIAL

## 2019-04-01 DIAGNOSIS — Z15.09 BRCA1 POSITIVE: ICD-10-CM

## 2019-04-01 DIAGNOSIS — Z76.89 ESTABLISHING CARE WITH NEW DOCTOR, ENCOUNTER FOR: ICD-10-CM

## 2019-04-01 DIAGNOSIS — Z15.01 BRCA1 POSITIVE: ICD-10-CM

## 2019-04-01 LAB
CANCER AG125 SERPL-ACNC: 14 U/ML (ref 0–30)
HGB BLD-MCNC: 12.4 G/DL (ref 11.7–15.7)

## 2019-04-01 PROCEDURE — 85018 HEMOGLOBIN: CPT | Performed by: INTERNAL MEDICINE

## 2019-04-01 PROCEDURE — 76830 TRANSVAGINAL US NON-OB: CPT | Performed by: OBSTETRICS & GYNECOLOGY

## 2019-04-01 PROCEDURE — 36415 COLL VENOUS BLD VENIPUNCTURE: CPT | Performed by: INTERNAL MEDICINE

## 2019-04-01 PROCEDURE — 86304 IMMUNOASSAY TUMOR CA 125: CPT | Performed by: INTERNAL MEDICINE

## 2019-04-02 DIAGNOSIS — N83.291 COMPLEX CYST OF RIGHT OVARY: Primary | ICD-10-CM

## 2019-04-02 NOTE — PROGRESS NOTES
SUBJECTIVE:   Ilda Hernandez is a 28 year old female who presents to clinic today for the following health issues:    Frequent nose bleeds      Duration: Over the last year    Description (location/character/radiation): Frequent nose bleeds that happen sometimes more than once a week and soaks through multiple tissues. Happens during the middle of the night always.    Intensity:  mild    Accompanying signs and symptoms: None    History (similar episodes/previous evaluation): Started happening when she moved to [place]    Precipitating or alleviating factors: None    Therapies tried and outcome: None     # IBS/bloating  Over the last few weeks has had increased abdominal bloating, not too much flatulence.  No change in appetite, diet, stools, slight increase (a few pounds) in weight.  Was told she had IBS in the past.      Problem list and histories reviewed & adjusted, as indicated.  Additional history: as documented    Patient Active Problem List   Diagnosis     BRCA1 positive     BRCA1 gene mutation positive     Other irritable bowel syndrome     Past Surgical History:   Procedure Laterality Date     BREAST LUMPECTOMY, RT/LT Left 01/2012    fibroadenoma     MASTECTOMY, NIPPLE SPARING Bilateral 6/11/2018    Procedure: MASTECTOMY, NIPPLE SPARING;  Bilateral Non Nipple Sparing Mastectomy with Bilateral Breast Reconstruction with Tisssue Expanders and Alloderm, Anesthesia Block;  Surgeon: Jose Daniel Henderson MD;  Location: [facility] OR     RECONSTRUCT BREAST BILATERAL Bilateral 6/11/2018    Procedure: RECONSTRUCT BREAST BILATERAL;;  Surgeon: Mahamed Grace MD;  Location:  OR       Social History     Tobacco Use     Smoking status: Never Smoker     Smokeless tobacco: Never Used   Substance Use Topics     Alcohol use: Yes     Comment: maybe 1/2 to 1 per week     Family History   Problem Relation Age of Onset     Breast Cancer Mother 42     Hereditary Breast and Ovarian Cancer Syndrome Mother         BRCA1+     Breast  "Cancer Maternal Grandmother 27         Current Outpatient Medications   Medication Sig Dispense Refill     acetaminophen (TYLENOL) 325 MG tablet Take 3 tablets (975 mg) by mouth every 8 hours as needed for mild pain 50 tablet 0     Ascorbic Acid (VITAMIN C GUMMIE PO) Take 2 chew tab by mouth       cefuroxime (CEFTIN) 250 MG tablet        Homeopathic Products (ZICAM ALLERGY RELIEF NA)        UNABLE TO FIND 2 chew tab MEDICATION NAME: Naturemade hair, skin and nails.       No Known Allergies    Reviewed and updated as needed this visit by clinical staff       Reviewed and updated as needed this visit by Provider         ROS:  Constitutional, HEENT, cardiovascular, pulmonary, gi and gu systems are negative, except as otherwise noted.    OBJECTIVE:     BP 94/56   Pulse 64   Temp 98.2  F (36.8  C) (Oral)   Ht 1.651 m (5' 5\")   Wt 54.3 kg (119 lb 12.8 oz)   SpO2 99%   BMI 19.94 kg/m    Body mass index is 19.94 kg/m .  GENERAL: healthy, alert and no distress  EYES: Eyes grossly normal to inspection, PERRL and conjunctivae and sclerae normal  HENT: ear canals and TM's normal, nose and mouth without ulcers or lesions  NECK: no adenopathy, no asymmetry, masses, or scars and thyroid normal to palpation  ABDOMEN: soft, nontender, without hepatosplenomegaly or masses, no organomegaly or masses, liver span normal to percussion and bowel sounds normal, fofana sign negative    Diagnostic Test Results:  none     ASSESSMENT/PLAN:         ICD-10-CM    1. Epistaxis R04.0 CBC with platelets     INR     Partial thromboplastin time   2. Bloating symptom R14.0 Comprehensive metabolic panel     Lipase   3. Other irritable bowel syndrome K58.8        Patient Instructions   For nose bleeds:  - lets start with some Vaseline in the affected mare.  - Will check some basic labs to ensure normal coagulopathy.  - if persistent despite these measures, contact me for a referral to ENT.    For bloating:  - daily probiotic (find one in your " budget with the highest CFU - I.e. colonies and the most number of strains).  - try simethicone (available otc) for gas  - will check some GI labs today - I will contact you with results and any recommendations.  If all is normal, this is likely your IBS acting up.  Focus on stress management and self-care.      Wilder Liao MD  [facility]

## 2019-04-04 ENCOUNTER — OFFICE VISIT (OUTPATIENT)
Dept: PEDIATRICS | Facility: CLINIC | Age: 29
End: 2019-04-04
Payer: COMMERCIAL

## 2019-04-04 VITALS
SYSTOLIC BLOOD PRESSURE: 94 MMHG | WEIGHT: 119.8 LBS | TEMPERATURE: 98.2 F | BODY MASS INDEX: 19.96 KG/M2 | HEIGHT: 65 IN | DIASTOLIC BLOOD PRESSURE: 56 MMHG | HEART RATE: 64 BPM | OXYGEN SATURATION: 99 %

## 2019-04-04 DIAGNOSIS — R04.0 EPISTAXIS: Primary | ICD-10-CM

## 2019-04-04 DIAGNOSIS — K58.8 OTHER IRRITABLE BOWEL SYNDROME: ICD-10-CM

## 2019-04-04 DIAGNOSIS — R14.0 BLOATING SYMPTOM: ICD-10-CM

## 2019-04-04 LAB
APTT PPP: 33 SEC (ref 22–37)
ERYTHROCYTE [DISTWIDTH] IN BLOOD BY AUTOMATED COUNT: 13.5 % (ref 10–15)
HCT VFR BLD AUTO: 38.5 % (ref 35–47)
HGB BLD-MCNC: 12.2 G/DL (ref 11.7–15.7)
INR PPP: 1.02 (ref 0.86–1.14)
LIPASE SERPL-CCNC: 144 U/L (ref 73–393)
MCH RBC QN AUTO: 26.6 PG (ref 26.5–33)
MCHC RBC AUTO-ENTMCNC: 31.7 G/DL (ref 31.5–36.5)
MCV RBC AUTO: 84 FL (ref 78–100)
PLATELET # BLD AUTO: 236 10E9/L (ref 150–450)
RBC # BLD AUTO: 4.59 10E12/L (ref 3.8–5.2)
WBC # BLD AUTO: 7.1 10E9/L (ref 4–11)

## 2019-04-04 PROCEDURE — 85027 COMPLETE CBC AUTOMATED: CPT | Performed by: INTERNAL MEDICINE

## 2019-04-04 PROCEDURE — 99214 OFFICE O/P EST MOD 30 MIN: CPT | Performed by: INTERNAL MEDICINE

## 2019-04-04 PROCEDURE — 85730 THROMBOPLASTIN TIME PARTIAL: CPT | Performed by: INTERNAL MEDICINE

## 2019-04-04 PROCEDURE — 80053 COMPREHEN METABOLIC PANEL: CPT | Performed by: INTERNAL MEDICINE

## 2019-04-04 PROCEDURE — 83690 ASSAY OF LIPASE: CPT | Performed by: INTERNAL MEDICINE

## 2019-04-04 PROCEDURE — 85610 PROTHROMBIN TIME: CPT | Performed by: INTERNAL MEDICINE

## 2019-04-04 PROCEDURE — 36415 COLL VENOUS BLD VENIPUNCTURE: CPT | Performed by: INTERNAL MEDICINE

## 2019-04-04 RX ORDER — CEFUROXIME AXETIL 250 MG/1
TABLET ORAL
COMMUNITY
Start: 2019-03-29 | End: 2020-04-24

## 2019-04-04 ASSESSMENT — MIFFLIN-ST. JEOR: SCORE: 1279.29

## 2019-04-04 NOTE — PATIENT INSTRUCTIONS
For nose bleeds:  - lets start with some Vaseline in the affected mare.  - Will check some basic labs to ensure normal coagulopathy.  - if persistent despite these measures, contact me for a referral to ENT.    For bloating:  - daily probiotic (find one in your budget with the highest CFU - I.e. colonies and the most number of strains).  - try simethicone (available otc) for gas  - will check some GI labs today - I will contact you with results and any recommendations.  If all is normal, this is likely your IBS acting up.  Focus on stress management and self-care.

## 2019-04-05 LAB
ALBUMIN SERPL-MCNC: 3.9 G/DL (ref 3.4–5)
ALP SERPL-CCNC: 42 U/L (ref 40–150)
ALT SERPL W P-5'-P-CCNC: 28 U/L (ref 0–50)
ANION GAP SERPL CALCULATED.3IONS-SCNC: 6 MMOL/L (ref 3–14)
AST SERPL W P-5'-P-CCNC: 27 U/L (ref 0–45)
BILIRUB SERPL-MCNC: 0.6 MG/DL (ref 0.2–1.3)
BUN SERPL-MCNC: 17 MG/DL (ref 7–30)
CALCIUM SERPL-MCNC: 8.7 MG/DL (ref 8.5–10.1)
CHLORIDE SERPL-SCNC: 109 MMOL/L (ref 94–109)
CO2 SERPL-SCNC: 25 MMOL/L (ref 20–32)
CREAT SERPL-MCNC: 0.58 MG/DL (ref 0.52–1.04)
GFR SERPL CREATININE-BSD FRML MDRD: >90 ML/MIN/{1.73_M2}
GLUCOSE SERPL-MCNC: 96 MG/DL (ref 70–99)
POTASSIUM SERPL-SCNC: 4.2 MMOL/L (ref 3.4–5.3)
PROT SERPL-MCNC: 7.5 G/DL (ref 6.8–8.8)
SODIUM SERPL-SCNC: 140 MMOL/L (ref 133–144)

## 2019-04-22 ENCOUNTER — TELEPHONE (OUTPATIENT)
Dept: PEDIATRICS | Facility: CLINIC | Age: 29
End: 2019-04-22

## 2019-04-22 ENCOUNTER — OFFICE VISIT (OUTPATIENT)
Dept: OBGYN | Facility: CLINIC | Age: 29
End: 2019-04-22
Payer: COMMERCIAL

## 2019-04-22 VITALS
HEART RATE: 49 BPM | BODY MASS INDEX: 19.64 KG/M2 | SYSTOLIC BLOOD PRESSURE: 93 MMHG | WEIGHT: 118 LBS | DIASTOLIC BLOOD PRESSURE: 61 MMHG

## 2019-04-22 DIAGNOSIS — K58.8 OTHER IRRITABLE BOWEL SYNDROME: Primary | ICD-10-CM

## 2019-04-22 DIAGNOSIS — Z01.419 ENCOUNTER FOR GYNECOLOGICAL EXAMINATION WITHOUT ABNORMAL FINDING: Primary | ICD-10-CM

## 2019-04-22 PROCEDURE — 99395 PREV VISIT EST AGE 18-39: CPT | Performed by: OBSTETRICS & GYNECOLOGY

## 2019-04-22 NOTE — PROGRESS NOTES
Ilda is a 28 year old  female who presents for annual exam.     Menses are regular q 30-35 days and normal lasting 6 days.  Menses flow: normal.  Patient's last menstrual period was 2019.. Using none for contraception.  She is currently considering pregnancy.  Besides routine health maintenance,  she would like to discuss  Getting pregnant. Plan to start trying in august. Spouse with trouble ejaculating, has not had evaluation.  Did have mastectomy done last year and very happy with results, no regrets.  Had normal Ca125 and recent pelvic ultrasound with small 1.5 cm complex cyst noted on the ovary.  Has repeat ultrasound scheduled. (BRCA1 positive)  GYNECOLOGIC HISTORY:  Menarche: 14  Ilda is sexually active with 1 male partner(s) and is currently in monogamous relationship with .    History sexually transmitted infections:No STD history  STI testing offered?  Declined  DALE exposure: Unknown  History of abnormal Pap smear: NO - age 21-29 PAP every 3 years recommended  Family history of breast CA: Yes (Please explain): mother, MGM and cousins  Family history of uterine/ovarian CA: No    Family history of colon CA: No    HEALTH MAINTENANCE:  Cholesterol: (  Cholesterol   Date Value Ref Range Status   2018 134 <200 mg/dL Final    History of abnormal lipids: No    Mammo:   no . History of abnormal Mammo: Not applicable.  Regular Self Breast Exams: No    Current or Past (Physical, Sexual or Emotional):  No  Do you feel safe in your environment:  Yes    Calcium/Vitamin D intake: source:  dairy, dietary supplement(s) Adequate? Yes   Last TDAP? 2018 Offered today? No    TSH: (No results found for: TSH )  Pap; (  Lab Results   Component Value Date    PAP NIL 2018    )    HISTORY:  OB History    Para Term  AB Living   0 0 0 0 0 0   SAB TAB Ectopic Multiple Live Births   0 0 0 0 0     Past Medical History:   Diagnosis Date     BRCA1 positive 2011    [id number],  identified using site specific testing through [facility] cliic     PONV (postoperative nausea and vomiting)      Past Surgical History:   Procedure Laterality Date     BREAST LUMPECTOMY, RT/LT Left 01/2012    fibroadenoma     MASTECTOMY, NIPPLE SPARING Bilateral 6/11/2018    Procedure: MASTECTOMY, NIPPLE SPARING;  Bilateral Non Nipple Sparing Mastectomy with Bilateral Breast Reconstruction with Tisssue Expanders and Alloderm, Anesthesia Block;  Surgeon: Jose Daniel Henderson MD;  Location: [facility] OR     RECONSTRUCT BREAST BILATERAL Bilateral 6/11/2018    Procedure: RECONSTRUCT BREAST BILATERAL;;  Surgeon: Mahamed Grace MD;  Location: [facility] OR     Family History   Problem Relation Age of Onset     Breast Cancer Mother 42     Hereditary Breast and Ovarian Cancer Syndrome Mother         BRCA1+     Breast Cancer Maternal Grandmother 27     Social History     Socioeconomic History     Marital status:      Spouse name: Saurabh     Number of children: 0     Years of education: None     Highest education level: None   Occupational History     Occupation:      Employer: [employer]   Social Needs     Financial resource strain: None     Food insecurity:     Worry: None     Inability: None     Transportation needs:     Medical: None     Non-medical: None   Tobacco Use     Smoking status: Never Smoker     Smokeless tobacco: Never Used   Substance and Sexual Activity     Alcohol use: Yes     Comment: maybe 1/2 to 1 per week     Drug use: No     Sexual activity: Yes     Partners: Male     Birth control/protection: Condom, Natural Family Planning   Lifestyle     Physical activity:     Days per week: None     Minutes per session: None     Stress: None   Relationships     Social connections:     Talks on phone: None     Gets together: None     Attends Yazidi service: None     Active member of club or organization: None     Attends meetings of clubs or organizations: None     Relationship status:  None     Intimate partner violence:     Fear of current or ex partner: None     Emotionally abused: None     Physically abused: None     Forced sexual activity: None   Other Topics Concern     Parent/sibling w/ CABG, MI or angioplasty before 65F 55M? Not Asked   Social History Narrative     None       Current Outpatient Medications:      acetaminophen (TYLENOL) 325 MG tablet, Take 3 tablets (975 mg) by mouth every 8 hours as needed for mild pain, Disp: 50 tablet, Rfl: 0     Ascorbic Acid (VITAMIN C GUMMIE PO), Take 2 chew tab by mouth, Disp: , Rfl:      cefuroxime (CEFTIN) 250 MG tablet, , Disp: , Rfl:      Homeopathic Products (ZICAM ALLERGY RELIEF NA), , Disp: , Rfl:      UNABLE TO FIND, 2 chew tab MEDICATION NAME: Naturemade hair, skin and nails., Disp: , Rfl:    No Known Allergies    Past medical, surgical, social and family history were reviewed and updated in EPIC.    EXAM:  BP 93/61   Pulse (!) 49   Wt 53.5 kg (118 lb)   LMP 04/05/2019   BMI 19.64 kg/m     BMI: Body mass index is 19.64 kg/m .  Constitutional: healthy, alert and no distress  Head: Normocephalic. No masses, lesions, tenderness or abnormalities  Neck: Neck supple. Trachea midline. No adenopathy. Thyroid symmetric, normal size.   Cardiovascular: RRR.   Respiratory: Negative.   Breast: s/p mastectomy with implants bilaterally  Gastrointestinal: Abdomen soft, non-tender, non-distended. No masses, organomegaly.  :  Vulva:  No external lesions, normal female hair distribution, no inguinal adenopathy.    Urethra:  Midline, non-tender, well supported, no discharge  Vagina:  Moist, pink, no abnormal discharge, no lesions  Uterus:  Normal size , non-tender, freely mobile  Ovaries:  No masses appreciated, non-tender, mobile  Rectal Exam: deferred  Musculoskeletal: extremities normal  Skin: no suspicious lesions or rashes  Psychiatric: Affect appropriate, cooperative,mentation appears normal.     COUNSELING:   Reviewed preventive health counseling,  as reflected in patient instructions       Family planning       Folic Acid Counseling   reports that she has never smoked. She has never used smokeless tobacco.    Body mass index is 19.64 kg/m .    FRAX Risk Assessment    ASSESSMENT:  28 year old female with satisfactory annual exam  (Z01.419) Encounter for gynecological examination without abnormal finding  (primary encounter diagnosis)  Comment: No birth control  Plan:   Patient Instructions   Cycle instructions:    The first day of bleeding/period is called Day 1.    Start testing for ovulation using the store bought ovulation predictor kits on Day 13 of cycle.  When you get a positive surge, you will most likely be ovulating within the next 24 hours.       Test the urine about the same time each day.  (should try to test between 2-4 pm, empty bladder about noon)         The test is positive when you see 2 dark solid lines.  (one faint line and one dark line is NOT positive)       Once the test is positive, you can stop testing.  Have sex/intercourse the day the test is positive.  The next day, have sex again.    If you don't have a period by 15-16 days after LH kit is positive (surge) then do urine pregnancy test and call clinic if positive.      Basic info:  The ovulation predictor kits are found in the condom/tampon section of the store.  Clear blue easy brand is simple to read.  Most women will get a positive LH surge before day 20 of the cycle.    Do not use lubercants with intercourse when trying to get pregnant.      Lifestyle recommendations when attempting pregnancy    Limit caffeine less than 300 mg/day  Alcohol less than 2 drinks per day  Exercise is fine, regular and moderate  Take multivitamin or prenatal vitamin daily  (Folic Acid 400 mcg per day)      Male infertility Dr. Snow at [facility] dept of urology (exam and consult)       Cherrie Wadsworth MD

## 2019-04-22 NOTE — TELEPHONE ENCOUNTER
Patient calling to update provider on medication Levsin she took previously for her IBS symptoms.  She states that she has not had relief since being seen in the office with providers recommendations of probiotic and simethicone.    She is requesting a medication to help with symptoms.  Reports took this previously as needed with meals for her symptoms.  She reports that she is willing to try another medication if provider feels would be appropriate.    Please advise to request for script   Khadra WITT RN - Triage  [facility]

## 2019-04-22 NOTE — TELEPHONE ENCOUNTER
Reason for Call:  Other prescription    Detailed comments: pt is calling back with the name of the medication that she was prescribed a couple of weeks ago. The name is Levsin 10.125mg taken PRN. Dr in [place] prescribed it. Sub lingual.  She was prescribed this years ago from a Dr. In [place].       [id number] - [place], [place] - [address]  AT [address]      Phone Number Patient can be reached at: Home number on file [phone] (home)    Best Time: any    Can we leave a detailed message on this number? YES    Call taken on 4/22/2019 at 11:26 AM by Rin Duarte

## 2019-04-22 NOTE — NURSING NOTE
"Chief Complaint   Patient presents with     Physical       Initial BP 93/61   Pulse (!) 49   Wt 53.5 kg (118 lb)   LMP 2019   BMI 19.64 kg/m   Estimated body mass index is 19.64 kg/m  as calculated from the following:    Height as of 19: 1.651 m (5' 5\").    Weight as of this encounter: 53.5 kg (118 lb).  BP completed using cuff size: regular    Questioned patient about current smoking habits.  Pt. has never smoked.          The following HM Due: NONE      The following patient reported/Care Every where data was sent to:  P ABSTRACT QUALITY INITIATIVES [11318]        N/a      Mellissa Singh MA                "

## 2019-04-22 NOTE — PATIENT INSTRUCTIONS
Cycle instructions:    The first day of bleeding/period is called Day 1.    Start testing for ovulation using the store bought ovulation predictor kits on Day 13 of cycle.  When you get a positive surge, you will most likely be ovulating within the next 24 hours.       Test the urine about the same time each day.  (should try to test between 2-4 pm, empty bladder about noon)         The test is positive when you see 2 dark solid lines.  (one faint line and one dark line is NOT positive)       Once the test is positive, you can stop testing.  Have sex/intercourse the day the test is positive.  The next day, have sex again.    If you don't have a period by 15-16 days after LH kit is positive (surge) then do urine pregnancy test and call clinic if positive.      Basic info:  The ovulation predictor kits are found in the condom/tampon section of the store.  Clear blue easy brand is simple to read.  Most women will get a positive LH surge before day 20 of the cycle.    Do not use lubercants with intercourse when trying to get pregnant.      Lifestyle recommendations when attempting pregnancy    Limit caffeine less than 300 mg/day  Alcohol less than 2 drinks per day  Exercise is fine, regular and moderate  Take multivitamin or prenatal vitamin daily  (Folic Acid 400 mcg per day)      Male infertility Dr. Snow at [facility] dept of urology (exam and consult)

## 2019-04-23 RX ORDER — HYOSCYAMINE SULFATE 0.125 MG
0.12 TABLET ORAL EVERY 4 HOURS PRN
Qty: 60 TABLET | Refills: 3 | Status: SHIPPED | OUTPATIENT
Start: 2019-04-23 | End: 2019-11-05

## 2019-05-28 ENCOUNTER — ANCILLARY PROCEDURE (OUTPATIENT)
Dept: ULTRASOUND IMAGING | Facility: CLINIC | Age: 29
End: 2019-05-28
Attending: OBSTETRICS & GYNECOLOGY
Payer: COMMERCIAL

## 2019-05-28 DIAGNOSIS — N83.291 COMPLEX CYST OF RIGHT OVARY: ICD-10-CM

## 2019-05-28 PROCEDURE — 76830 TRANSVAGINAL US NON-OB: CPT | Performed by: OBSTETRICS & GYNECOLOGY

## 2019-06-07 ENCOUNTER — TRANSFERRED RECORDS (OUTPATIENT)
Dept: HEALTH INFORMATION MANAGEMENT | Facility: CLINIC | Age: 29
End: 2019-06-07

## 2019-10-20 ENCOUNTER — TELEPHONE (OUTPATIENT)
Dept: OBGYN | Facility: CLINIC | Age: 29
End: 2019-10-20

## 2019-10-20 NOTE — TELEPHONE ENCOUNTER
Reason for call:  Other   Patient called regarding (reason for call): appointment  Additional comments: Patient had a positive home pregnancy test. Is looking to schedule a visit with Dr. Wadsworth    Phone number to reach patient:  Cell number on file:    Telephone Information:   Mobile [phone]       Best Time:  Around Noon    Can we leave a detailed message on this number?  YES

## 2019-10-28 ENCOUNTER — OFFICE VISIT (OUTPATIENT)
Dept: PEDIATRICS | Facility: CLINIC | Age: 29
End: 2019-10-28
Payer: COMMERCIAL

## 2019-10-28 VITALS
SYSTOLIC BLOOD PRESSURE: 92 MMHG | TEMPERATURE: 98.4 F | BODY MASS INDEX: 19.47 KG/M2 | HEART RATE: 51 BPM | DIASTOLIC BLOOD PRESSURE: 60 MMHG | WEIGHT: 117 LBS | OXYGEN SATURATION: 98 %

## 2019-10-28 DIAGNOSIS — Z33.1 PREGNANCY, INCIDENTAL: ICD-10-CM

## 2019-10-28 DIAGNOSIS — J06.9 VIRAL URI WITH COUGH: Primary | ICD-10-CM

## 2019-10-28 PROCEDURE — 99213 OFFICE O/P EST LOW 20 MIN: CPT | Performed by: PHYSICIAN ASSISTANT

## 2019-10-28 NOTE — PROGRESS NOTES
Subjective     Ilda Hernandez is a 29 year old female who presents to clinic today for the following health issues:    HPI   Acute Illness   Acute illness concerns: cough  Onset: 1 week     Fever: no    Chills/Sweats: no    Headache (location?): no    Sinus Pressure:YES- just last week     Conjunctivitis:  no    Ear Pain: no    Rhinorrhea: no    Congestion: no    Sore Throat: no     Cough: YES-    Wheeze: no    Decreased Appetite: YES    Nausea: no    Vomiting: no    Diarrhea:  no    Dysuria/Freq.: no    Fatigue/Achiness: no    Sick/Strep Exposure: YES- work      Therapies Tried and outcome: nothing   Teacher  Nonsmoker.   6 weeks pregnant    Review of Systems   ROS COMP: Constitutional, HEENT, cardiovascular, pulmonary, gi and gu systems are negative, except as otherwise noted.      Objective    BP 92/60 (BP Location: Right arm, Cuff Size: Adult Regular)   Pulse 51   Temp 98.4  F (36.9  C) (Tympanic)   Wt 53.1 kg (117 lb)   SpO2 98%   BMI 19.47 kg/m    Body mass index is 19.47 kg/m .  Physical Exam   GENERAL: alert and no distress  EYES: Eyes grossly normal to inspection, PERRL and conjunctivae and sclerae normal  HENT: ear canals and TM's normal, nose and mouth without ulcers or lesions  NECK: no adenopathy  RESP: lungs clear to auscultation - no rales, rhonchi or wheezes  CV: regular rate and rhythm, normal S1 S2, no S3 or S4    Diagnostic Test Results:  No results found for this or any previous visit (from the past 24 hour(s)).        Assessment & Plan   1. Viral URI with cough  Continue with symptomatic treatment.     2. Pregnancy, incidental    Trisha Dye PA-C  [facility]

## 2019-11-05 ENCOUNTER — TRANSCRIBE ORDERS (OUTPATIENT)
Dept: MATERNAL FETAL MEDICINE | Facility: CLINIC | Age: 29
End: 2019-11-05

## 2019-11-05 ENCOUNTER — PRENATAL OFFICE VISIT (OUTPATIENT)
Dept: NURSING | Facility: CLINIC | Age: 29
End: 2019-11-05
Payer: COMMERCIAL

## 2019-11-05 VITALS
WEIGHT: 116 LBS | DIASTOLIC BLOOD PRESSURE: 70 MMHG | HEIGHT: 65 IN | TEMPERATURE: 97.7 F | HEART RATE: 74 BPM | BODY MASS INDEX: 19.33 KG/M2 | SYSTOLIC BLOOD PRESSURE: 110 MMHG

## 2019-11-05 DIAGNOSIS — Z23 NEED FOR TDAP VACCINATION: ICD-10-CM

## 2019-11-05 DIAGNOSIS — Z34.00 SUPERVISION OF NORMAL FIRST PREGNANCY: Primary | ICD-10-CM

## 2019-11-05 DIAGNOSIS — O26.90 PREGNANCY RELATED CONDITION, ANTEPARTUM: Primary | ICD-10-CM

## 2019-11-05 LAB
ABO + RH BLD: NORMAL
ABO + RH BLD: NORMAL
ALBUMIN UR-MCNC: NEGATIVE MG/DL
APPEARANCE UR: CLEAR
BILIRUB UR QL STRIP: NEGATIVE
BLD GP AB SCN SERPL QL: NORMAL
BLOOD BANK CMNT PATIENT-IMP: NORMAL
COLOR UR AUTO: YELLOW
ERYTHROCYTE [DISTWIDTH] IN BLOOD BY AUTOMATED COUNT: 13.1 % (ref 10–15)
GLUCOSE UR STRIP-MCNC: NEGATIVE MG/DL
HCG UR QL: POSITIVE
HCT VFR BLD AUTO: 40.3 % (ref 35–47)
HGB BLD-MCNC: 13 G/DL (ref 11.7–15.7)
HGB UR QL STRIP: NEGATIVE
KETONES UR STRIP-MCNC: NEGATIVE MG/DL
LEUKOCYTE ESTERASE UR QL STRIP: NEGATIVE
MCH RBC QN AUTO: 26.7 PG (ref 26.5–33)
MCHC RBC AUTO-ENTMCNC: 32.3 G/DL (ref 31.5–36.5)
MCV RBC AUTO: 83 FL (ref 78–100)
NITRATE UR QL: NEGATIVE
PH UR STRIP: 8 PH (ref 5–7)
PLATELET # BLD AUTO: 222 10E9/L (ref 150–450)
RBC # BLD AUTO: 4.87 10E12/L (ref 3.8–5.2)
SOURCE: ABNORMAL
SP GR UR STRIP: 1.01 (ref 1–1.03)
SPECIMEN EXP DATE BLD: NORMAL
UROBILINOGEN UR STRIP-ACNC: 0.2 EU/DL (ref 0.2–1)
WBC # BLD AUTO: 6.9 10E9/L (ref 4–11)

## 2019-11-05 PROCEDURE — 87340 HEPATITIS B SURFACE AG IA: CPT | Performed by: OBSTETRICS & GYNECOLOGY

## 2019-11-05 PROCEDURE — 81003 URINALYSIS AUTO W/O SCOPE: CPT | Performed by: OBSTETRICS & GYNECOLOGY

## 2019-11-05 PROCEDURE — 87086 URINE CULTURE/COLONY COUNT: CPT | Performed by: OBSTETRICS & GYNECOLOGY

## 2019-11-05 PROCEDURE — 86645 CMV ANTIBODY IGM: CPT | Performed by: OBSTETRICS & GYNECOLOGY

## 2019-11-05 PROCEDURE — 86850 RBC ANTIBODY SCREEN: CPT | Performed by: OBSTETRICS & GYNECOLOGY

## 2019-11-05 PROCEDURE — 85027 COMPLETE CBC AUTOMATED: CPT | Performed by: OBSTETRICS & GYNECOLOGY

## 2019-11-05 PROCEDURE — 86901 BLOOD TYPING SEROLOGIC RH(D): CPT | Performed by: OBSTETRICS & GYNECOLOGY

## 2019-11-05 PROCEDURE — 87389 HIV-1 AG W/HIV-1&-2 AB AG IA: CPT | Performed by: OBSTETRICS & GYNECOLOGY

## 2019-11-05 PROCEDURE — 81025 URINE PREGNANCY TEST: CPT | Performed by: OBSTETRICS & GYNECOLOGY

## 2019-11-05 PROCEDURE — 36415 COLL VENOUS BLD VENIPUNCTURE: CPT | Performed by: OBSTETRICS & GYNECOLOGY

## 2019-11-05 PROCEDURE — 99207 ZZC NO CHARGE NURSE ONLY: CPT

## 2019-11-05 PROCEDURE — 86780 TREPONEMA PALLIDUM: CPT | Performed by: OBSTETRICS & GYNECOLOGY

## 2019-11-05 PROCEDURE — 86762 RUBELLA ANTIBODY: CPT | Performed by: OBSTETRICS & GYNECOLOGY

## 2019-11-05 PROCEDURE — 86644 CMV ANTIBODY: CPT | Performed by: OBSTETRICS & GYNECOLOGY

## 2019-11-05 PROCEDURE — 86900 BLOOD TYPING SEROLOGIC ABO: CPT | Performed by: OBSTETRICS & GYNECOLOGY

## 2019-11-05 RX ORDER — HYOSCYAMINE SULFATE 0.125 MG
TABLET ORAL
Refills: 3 | COMMUNITY
Start: 2019-04-23 | End: 2020-04-24

## 2019-11-05 SDOH — HEALTH STABILITY: MENTAL HEALTH
STRESS IS WHEN SOMEONE FEELS TENSE, NERVOUS, ANXIOUS, OR CAN'T SLEEP AT NIGHT BECAUSE THEIR MIND IS TROUBLED. HOW STRESSED ARE YOU?: NOT AT ALL

## 2019-11-05 SDOH — SOCIAL STABILITY: SOCIAL INSECURITY: WITHIN THE LAST YEAR, HAVE YOU BEEN AFRAID OF YOUR PARTNER OR EX-PARTNER?: NO

## 2019-11-05 SDOH — SOCIAL STABILITY: SOCIAL NETWORK: ARE YOU MARRIED, WIDOWED, DIVORCED, SEPARATED, NEVER MARRIED, OR LIVING WITH A PARTNER?: MARRIED

## 2019-11-05 SDOH — SOCIAL STABILITY: SOCIAL INSECURITY: WITHIN THE LAST YEAR, HAVE YOU BEEN HUMILIATED OR EMOTIONALLY ABUSED IN OTHER WAYS BY YOUR PARTNER OR EX-PARTNER?: NO

## 2019-11-05 SDOH — SOCIAL STABILITY: SOCIAL INSECURITY
WITHIN THE LAST YEAR, HAVE YOU BEEN KICKED, HIT, SLAPPED, OR OTHERWISE PHYSICALLY HURT BY YOUR PARTNER OR EX-PARTNER?: NO

## 2019-11-05 SDOH — SOCIAL STABILITY: SOCIAL INSECURITY
WITHIN THE LAST YEAR, HAVE TO BEEN RAPED OR FORCED TO HAVE ANY KIND OF SEXUAL ACTIVITY BY YOUR PARTNER OR EX-PARTNER?: NO

## 2019-11-05 ASSESSMENT — MIFFLIN-ST. JEOR: SCORE: 1257.05

## 2019-11-05 NOTE — PROGRESS NOTES
Important Information for Provider:     Patient presents for new ob teaching and labs, first pregnancy. Ordered first trimester screening/NIPT. Handouts reviewed and given. Recommended B6, Unisom for nausea. Patient is a , has student with CMV.  CMV titer drawn today with NOB labs. Has NOB with Dr Lyle 12/03/19    Caffeine intake/servings daily - 0  Calcium intake/servings daily - 3  Exercise 5 times weekly - describe ; treadmill, precautions given  Sunscreen used - Yes  Seatbelts used - Yes  Guns stored in the home - No  Self Breast Exam - Yes  Pap test up to date -  Yes  Eye exam up to date -  Yes  Dental exam up to date -  Yes  DEXA scan up to date -  No  Flex Sig/Colonoscopy up to date -  No  Mammography up to date -  Yes, mastectomy (bilateral) BRACA 1 gene, strong family history(mother, maternal grandmother, sister, brother)    Immunizations reviewed and up to date - Yes  Abuse: Current or Past (Physical, Sexual or Emotional) - No  Do you feel safe in your environment - Yes  Do you cope well with stress - Yes  Do you suffer from insomnia - No         Prenatal OB Questionnaire  Patient supplied answers from flow sheet for:  Prenatal OB Questionnaire.  Past Medical History  Diabetes?: No  Hypertension : No  Heart disease, mitral valve prolapse or rheumatic fever?: No  An autoimmune disease such as lupus or rheumatoid arthritis?: No  Kidney disease or urinary tract infection?: No  Epilepsy, seizures or spells?: No  Migraine headaches?: No  A stroke or loss of function or sensation?: No  Any other neurological problems?: No  Have you ever been treated for depression?: counseling, no medications  Are you having problems with crying spells or loss of self-esteem?: No  Have you ever required psychiatric care?: No  Have you ever had hepatitis, liver disease or jaundice?: No  Have you been treated for blood clots in your veins, deep vein thromosis, inflammation in the veins, thrombosis, phlebitis,  pulmonary embolism or varicosities?: No  Have you had excessive bleeding after surgery or dental work?: No  Do you bleed more than other women after a cut or scratch?: No  Do you have a history of anemia?: No  Have you ever had thyroid problems or taken thyroid medication?: No   Do you have any endocrine problems?: No  Have you ever been in a major accident or suffered serious trauma?: No  Within the last year, has anyone hit, slapped, kicked or otherwise hurt you?: No  In the last year, has anyone forced you to have sex when you didn't want to?: No    Past Medical History 2   Have you ever received a blood transfusion?: No  Would you refuse a blood transfusion if a doctor judged it to be medically necessary?: No   If you answered Yes, would you rather die than receive a blood transfusion?: No  If you answered Yes, is this for Spiritism reasons?: No  Does anyone in your home smoke?: No  Do you use tobacco products?: No  Do you drink beer, wine or hard liquor?: No  Do you use any of the following: marijuana, speed, cocaine, heroin, hallucinogens or other drugs?: No   Is your blood type Rh negative?: No  Have you ever had abnormal antibodies in your blood?: No  Have you ever had asthma?: No  Have you ever had tuberculosis?: No  Do you have any allergies to drugs or over-the-counter medications?: No  Allergies: Dust Mites, Aspartame, Ethanol, Venlafaxine, Hydrochloride, Sertraline: No  Have you had any breast problems?: (!) Yes BRACA 1  Have you ever ?: No  Have you had any gynecological surgical procedures such as cervical conization, a LEEP procedure, laser treatment, cryosurgery of the cervix or a dilation and curettage, etc?: No  Have you ever had any other surgical procedures?: (!) Yes(mastectomy,BRACA1, reconstruction of breasts) 6/2018  Have you been hospitalized for a nonsurgical reason excluding normal delivery?: No  Have you ever had any anesthetic complications?: No  Have you ever had an abnormal  pap smear?: No    Past Medical History (Continued)  Do you have a history of abnormalities of the uterus?: No  Did your mother take DALE or any other hormones when she was pregnant with you?: No  Did it take you more than a year to become pregnant?: No  Have you ever been evaluated or treated for infertility?: No  Is there a history of medical problems in your family, which you feel may be important to this pregnancy?: No  Do you have any other problems we have not asked about which you feel may be important to this pregnancy?: No    Symptoms since last menstrual period  Do you have any of the following symptoms: abdominal pain, blood in stools or urine, chest pain, shortness of breath, coughing or vomiting up blood, your heart racing or skipping beats, nausea and vomiting, pain on urination or vaginal discharge or bleed: (!) Yes  Will the patient be 35 years old or older at the time of delivery?: No    Has the patient, baby's father or anyone in either family had:  Thalassemia (Italian, Greek, Mediterranean or  background only) and an MCV result less than 80?: No  Neural tube defect such as meningomyelocele, spina bifida or anencephaly?: No  Congenital heart defect?: No  Down's Syndrome?: No  Darrell-Sachs disease (Mandaen, Cajun, Persian-Alexand[place])?: No  Sickle cell disease or trait ()?: No  Hemophilia or other inherited problems of blood?: No  Muscular dystrophy?: No  Cystic fibrosis?: No  Sonoma's chorea?: No  Mental retardation/autism?: No  If yes, was the person tested for fragile X?: No  Any other inherited genetic or chromosomal disorder?: No  Maternal metabolic disorder (e.g Insulin-dependent diabetes, PKU)?: No  A child with birth defects not listed above?: No  Recurrent pregnancy loss or stillbirth?: No   Has the patient had any medications/street drugs/alcohol since her last menstrual period?: No  Does the patient or baby's father have any other genetic risks?: No    Infection History   Do you  object to being tested for Hepatitis B?: No  Do you object to being tested for HIV?: No   Do you feel that you are at high risk for coming in contact with the AIDS virus?: No  Have you ever been treated for tuberculosis?: No  Have you ever had a positive skin test for tuberculosis?: No  Do you live with someone who has tuberculosis?: No  Have you ever been exposed to tuberculosis?: No  Do you have genital herpes?: No  Does your partner have genital herpes?: No  Have you had a viral illness since your last period?: No  Have you ever had gonorrhea, chlamydia, syphilis, venereal warts, trichomoniasis, pelvic inflammatory disease or any other sexually transmitted disease?: No  Do you know if you are a genital group B streptococcus carrier?: No  Have you had chicken pox/varicella?: (!) Yes   Have you been vaccinated against chicken Pox?: No  Have you had any other infectious diseases?: Mono in college      Allergies as of 11/5/2019:    Allergies as of 11/05/2019     (No Known Allergies)       Current medications are:  Current Outpatient Medications   Medication Sig Dispense Refill     cefuroxime (CEFTIN) 250 MG tablet        hyoscyamine (ANASPAZ/LEVSIN) 0.125 MG tablet   3         Early ultrasound screening tool:    Does patient have irregular periods?  No  Did patient use hormonal birth control in the three months prior to positive urine pregnancy test? No  Is the patient breastfeeding?  No  Is the patient 10 weeks or greater at time of education visit?  No

## 2019-11-06 LAB
CMV IGG SERPL QL IA: 1.3 AI (ref 0–0.8)
HBV SURFACE AG SERPL QL IA: NONREACTIVE
HIV 1+2 AB+HIV1 P24 AG SERPL QL IA: NONREACTIVE
RUBV IGG SERPL IA-ACNC: 24 IU/ML
T PALLIDUM AB SER QL: NONREACTIVE

## 2019-11-07 DIAGNOSIS — O99.891 CMV EXPOSURE COMPLICATING PREGNANCY: Primary | ICD-10-CM

## 2019-11-07 DIAGNOSIS — Z20.828 CMV EXPOSURE COMPLICATING PREGNANCY: Primary | ICD-10-CM

## 2019-11-07 LAB
BACTERIA SPEC CULT: NORMAL
CMV IGM SERPL QL IA: <0.2 AI (ref 0–0.8)
SPECIMEN SOURCE: NORMAL

## 2019-11-08 ENCOUNTER — TELEPHONE (OUTPATIENT)
Dept: OBGYN | Facility: CLINIC | Age: 29
End: 2019-11-08

## 2019-11-08 NOTE — TELEPHONE ENCOUNTER
Patient is 6w6d, calling regarding lab results. Pt got the Call Britannia message with the results. Had questions regarding her blood type. Discussed with patient when she will get rhogam injection.  Pt still have questions in regards to the CMV. Wondering if she is immune to it. She has a student that has CMV that she sees twice a week. Wondering if it is just exposure to bodily fluids that she needs to be concerned about. And if she is exposed, what are the risks/dangers to the baby. Pt would like ot discuss with you. Or you can send her a Call Britannia message. Thanks.   Vicky Ferrara, RN-BSN

## 2019-11-08 NOTE — TELEPHONE ENCOUNTER
Called patient and discussed at length CMV infection and immunity.  Also reviewed her Rh blood type and that if she has bleeding in pregnancy she would require rhogam infection.  Also reviewed routine rhogam administration at 28 weeks and delivery based on rhogam eval.    Melissa Lyle MD

## 2019-11-13 ENCOUNTER — TELEPHONE (OUTPATIENT)
Dept: OBGYN | Facility: CLINIC | Age: 29
End: 2019-11-13

## 2019-11-13 DIAGNOSIS — R11.2 NAUSEA AND VOMITING, INTRACTABILITY OF VOMITING NOT SPECIFIED, UNSPECIFIED VOMITING TYPE: Primary | ICD-10-CM

## 2019-11-13 NOTE — TELEPHONE ENCOUNTER
Patient is 7w4d, has been severely nauseous for the past week. End of day turns into a bad dizzy headache and she sweats. Has been taking the vitamin B6 and unisom. Not helping. Still feeling pretty miserable. Has been drinking a lot of water. Hasn't thrown up at all. Just too nauseous to eat anything substantial. Would you be able to send a prescription to help with the nausea? Pharmacy teed up.   Vicky Ferrara, RN-BSN

## 2019-11-14 RX ORDER — METOCLOPRAMIDE 10 MG/1
10 TABLET ORAL
Qty: 60 TABLET | Refills: 3 | Status: SHIPPED | OUTPATIENT
Start: 2019-11-14 | End: 2020-03-10

## 2019-11-14 NOTE — TELEPHONE ENCOUNTER
Script for Reglan done and let her know if not helping, can always do Zofran.    Thanks  Cherrie Wadsworth MD

## 2019-12-03 ENCOUNTER — PRENATAL OFFICE VISIT (OUTPATIENT)
Dept: OBGYN | Facility: CLINIC | Age: 29
End: 2019-12-03
Payer: COMMERCIAL

## 2019-12-03 VITALS
BODY MASS INDEX: 20.47 KG/M2 | HEART RATE: 88 BPM | WEIGHT: 123 LBS | DIASTOLIC BLOOD PRESSURE: 75 MMHG | SYSTOLIC BLOOD PRESSURE: 106 MMHG

## 2019-12-03 DIAGNOSIS — Z11.3 SCREEN FOR STD (SEXUALLY TRANSMITTED DISEASE): ICD-10-CM

## 2019-12-03 DIAGNOSIS — Z67.91 RH NEGATIVE STATE IN ANTEPARTUM PERIOD: ICD-10-CM

## 2019-12-03 DIAGNOSIS — O26.899 RH NEGATIVE STATE IN ANTEPARTUM PERIOD: ICD-10-CM

## 2019-12-03 DIAGNOSIS — Z34.01 ENCOUNTER FOR PRENATAL CARE IN FIRST TRIMESTER OF FIRST PREGNANCY: Primary | ICD-10-CM

## 2019-12-03 DIAGNOSIS — O21.9 NAUSEA AND VOMITING OF PREGNANCY, ANTEPARTUM: ICD-10-CM

## 2019-12-03 PROCEDURE — 87591 N.GONORRHOEAE DNA AMP PROB: CPT | Performed by: OBSTETRICS & GYNECOLOGY

## 2019-12-03 PROCEDURE — 99207 ZZC FIRST OB VISIT: CPT | Performed by: OBSTETRICS & GYNECOLOGY

## 2019-12-03 PROCEDURE — 87491 CHLMYD TRACH DNA AMP PROBE: CPT | Performed by: OBSTETRICS & GYNECOLOGY

## 2019-12-03 NOTE — PATIENT INSTRUCTIONS
Patient Education     If You Are Rh Negative     A Rho(D) immune globulin injection protects against Rh disease in this and future pregnancies.     If you re Rh negative, ask your healthcare provider about getting treated with Rho(D) immune globulin. Even if you miscarry or don t deliver the baby, you will still need treatment. The health of any baby you have in the future depends on it.  When are you treated?  If your blood has not formed Rh antibodies, you ll be treated during week 28 of your pregnancy. You also may be treated any time there s a chance that fetal blood has mixed with yours. For example, this might be after an amniocentesis, a prenatal test. Or it might be if you have vaginal bleeding earlier than 28 weeks. Treatment is an injection of a medicine called Rho(D) immune globulin. Rho(D) immune globulin stops Rh antibodies from forming. It won t harm you or the fetus. After you give birth, your baby s blood will be tested. If it s Rh positive, you ll be given Rho(D) immune globulin again within 3 days. If it s Rh negative, you won t need Rho(D) immune globulin until your next pregnancy.  Preventing future problems  Your chance of forming Rh antibodies increases with each pregnancy. This is true even for an ectopic pregnancy (the fertilized egg is outside the uterus). It is also true for pregnancies that end in miscarriage or . In these cases, you will most likely get a Rho(D) immune globulin injection. This is because your body can make Rh antibodies even if you don t deliver a baby. Rh antibodies can cause problems in future pregnancies.  If you have Rh antibodies  If antibodies have already formed (sensitization), Rho(D) immune globulin can t protect the fetus. You and the fetus will need special care during pregnancy. Your healthcare provider will explain the details to you.   Date Last Reviewed: 2016-2018 [employer]). [address] [place] [address], [place] [address].  All rights reserved. This information is not intended as a substitute for professional medical care. Always follow your healthcare professional's instructions.           Patient Education     Adapting to Pregnancy: First Trimester    As your body adjusts, you may have to change or limit your daily activities. You ll need more rest. You may also need to use the energy you have more wisely.  Your changing body  Almost every part of your body is affected as you adapt to pregnancy. The uterus and cervix will begin to soften right away. You may not look very pregnant during the first 3 months. But you are likely to have some common signs of early pregnancy:    Nausea    Fatigue    Frequent urination    Mood swings    Bloating of the belly    Constipation    Heartburn    Missed or light periods (first trimester bleeding)    Nipple or breast tenderness and breast swelling  It s not too late to start good habits  What matters most is protecting your baby from this moment on. If you smoke, drink alcohol, or use drugs, now is the time to stop. If you need help, talk with your healthcare provider:    Smoking increases the risk of stillbirth or having a low-birth-weight baby. If you smoke, quit now.    Alcohol and drugs have been linked with miscarriage, birth defects, intellectual disability, and low birth weight. Do not drink alcohol or take drugs.  Tips to relieve nausea  Although nausea can happen at any time of the day, it may be worse in the morning. To help prevent nausea:    Eat small, light meals at frequent intervals.    Drink fluids often.    Get up slowly. Eat a few unsalted crackers before you get out of bed.    Avoid smells that bother you.    Avoid spicy and fatty foods.    Eat an ice pop in your favorite flavor.    Get plenty of rest.    Ask your healthcare provider about taking zahira or vitamin B6 for nausea and vomiting.    Talk with your healthcare provider if you take vitamins that upset your stomach.  Work  "concerns  The end of the first trimester is a good time to discuss working during pregnancy with your employer. Follow your healthcare provider s advice if your job needs you to stand for a long time, work with hazardous tools, or even sit at a desk all day. Your workspace, workload, or scheduled hours may need to be adjusted. Perhaps you can change body postures more often or take an extra break.  Advice for travel  Talk to your healthcare provider first, but the second trimester may be the best time for any travel. You may be advised to avoid certain trips while you re pregnant. Food and water can be concerns in developing countries. Travel by car is a good choice, as you can stop, get out, and stretch. Bring snacks and water along. Fasten the lap belt below your belly, low over your hips. Also be sure to wear the shoulder harness.  Intimacy  Unless your healthcare provider tells you to, there is no reason to stop having sex while you re pregnant. You or your partner may notice changes in desire. Desire may be less in the first trimester, due to nausea and fatigue. In the second trimester, sex may be very enjoyable. The third trimester can be a challenge comfort-wise. Try different positions and see what s best for you both.  Date Last Reviewed: 10/1/2017    8068-8252 [employer]. [address] [place] [address], [place] [address]. All rights reserved. This information is not intended as a substitute for professional medical care. Always follow your healthcare professional's instructions.           Patient Education     Pregnancy: Your First Trimester Changes  The first trimester is a time of rapid development for your baby. Because your baby is growing so quickly, it is important that you start a healthy lifestyle right away. By the end of the first trimester, your baby has formed all of its major body organs and weighs just over an ounce.     Actual size of baby is 1/4\"    Month 1 (weeks 1 to 4)  The " "placenta (the organ that nourishes your baby) begins to form. The brain, spinal cord, heart, gastrointestinal tract, and lungs begin to develop. Your baby is about 1/4-inch long by the end of the first month.     Actual size of baby is 1\"      Month 2 (weeks 5 to 8)  All of your baby s major body organs form. The face, fingers, toes, ears, and eyes appear. By the end of the month, your baby is about 1-inch long.     Actual size of baby is 3\"      Month 3 (weeks 9 to 12)  Your baby can open and close its fists and mouth. The sexual organs begin to form. As the first trimester ends, your baby is about 3-inches long.  Date Last Reviewed: 10/1/2017    1691-4298 [employer]. [address] [place] [address], [place] [address]. All rights reserved. This information is not intended as a substitute for professional medical care. Always follow your healthcare professional's instructions.           "

## 2019-12-03 NOTE — PROGRESS NOTES
[facility]  KIM: 2020  SUBJECTIVE:     HPI:  Ilda Hernandez is a 29 year old  at 10w3d by LMP who presents today for routine prenatal care.  She states that overall she has been doing ok except she has been experiencing a lot of nausea.  She feels that the nausea is at its worst between 3:30 and 4 in the afternoon.  She initially tried unisom, which made her too tired.  Then vitamin B6, which did not work for her.  Now she has been taking Reglan, which she feels is helping.  She feels that she is able to eat enough bland foods and maintain her hydration.  She also reports constipation.  Prior to pregnancy she was having 2 bowel movements per day, which was her normal.  Since becoming pregnant she only has one bowel movement per day and she feels constipated.  She has not tried anything for constipation.  She denies any fever, chills, headache, chest pain, chest pressure, shortness of breath, changes in vision, vomiting, diarrhea, or edema    Pregnancy notable for:  -Rh negative status     OBGYN Hx:    LMP- 19, sure  Menses- regular, q30 days  STD hx- patient denies any history of STDs  Pap hx- 3/12/18- NIL    PMH- BRCA 1 positive  PSH- bilateral mastectomy, non-nipple sparing with reconstruction.  3 total procedures, right lumpectomy with fibroadenoma prior to surgery  Meds- reglan, PNV  Allergies- NKDA  SH- patient denies any tobacco, alcohol or drug use  FH- mother with breast cancer age 43, BRCA1 positive  Maternal grandmother with breast cancer  Paternal grandfather with lung cancer, was a smoker      OBJECTIVE:     EXAM:  /75   Pulse 88   Wt 55.8 kg (123 lb)   LMP 2019   BMI 20.47 kg/m   Body mass index is 20.47 kg/m .    GENERAL: healthy, alert and no distress  EYES: Eyes grossly normal to inspection,  conjunctivae and sclerae normal  NECK: no adenopathy, no asymmetry, masses, or scars and thyroid normal to palpation  RESP: breathing comfortably on room  air  BREAST: surgically absent breast tissue bilaterally, overlying skin with well healed incision sites, nipples surgically absent, skin normal color without any masses, no axillary masses palpated   CV: regular rate  ABDOMEN: soft, nontender, no masses   (female): normal female external genitalia, normal urethral meatus, vaginal mucosa pink, moist, well rugated, and normal cervix with significant ectropion, adnexa/uterus without masses or discharge  MS: no gross musculoskeletal defects noted, no edema  SKIN: no suspicious lesions or rashes  NEURO: Normal strength and tone, mentation intact and speech normal  PSYCH: mentation appears normal, affect normal/bright    BSUS: single IUP with FHR visualized     ASSESSMENT/PLAN:     Ilda Hernandez is a 29 year old  at 10w3d by LMP who presents today for routine prenatal care.    (Z34.01) Encounter for prenatal care in first trimester of first pregnancy  (primary encounter diagnosis)  Comment: Reviewed timing of visits, team structure on L&D, and resident participation.  Discussed genetic testing in pregnancy and patient does plan testing.  Reviewed normal weight gain in pregnancy for her is 25-35 lbs.   Plan: Next visit with Dr. Wadsworth scheduled for 1/3/19.  MFM visit scheduled for 19 for genetic testing    (Z11.3) Screen for STD (sexually transmitted disease)  Comment: routine GC/CT testing collected   Plan: NEISSERIA GONORRHOEA PCR, CHLAMYDIA TRACHOMATIS        PCR    (O21.9) Nausea and vomiting of pregnancy, antepartum  Comment: Patient with continued nausea, but no vomiting.  Nausea improved with reglan.  Reviewed small bland meal recommendations and maintaining adequate hydration.  Plan: continue reglan for nausea symptoms    (O26.899,  Z67.91) Rh negative state in antepartum period  Comment: Reviewed Rh negative status and importance of reporting and getting rhogam with vaginal bleeding in pregnancy.  Reviewed risks of alloimmunization.    Plan: rhogam  at 28 weeks and rhogam eval at time of delivery.    Melissa Lyle MD

## 2019-12-04 LAB
C TRACH DNA SPEC QL NAA+PROBE: NEGATIVE
N GONORRHOEA DNA SPEC QL NAA+PROBE: NEGATIVE
SPECIMEN SOURCE: NORMAL
SPECIMEN SOURCE: NORMAL

## 2019-12-09 ENCOUNTER — PRE VISIT (OUTPATIENT)
Dept: MATERNAL FETAL MEDICINE | Facility: CLINIC | Age: 29
End: 2019-12-09

## 2019-12-11 ENCOUNTER — OFFICE VISIT (OUTPATIENT)
Dept: MATERNAL FETAL MEDICINE | Facility: CLINIC | Age: 29
End: 2019-12-11
Attending: OBSTETRICS & GYNECOLOGY
Payer: COMMERCIAL

## 2019-12-11 ENCOUNTER — HOSPITAL ENCOUNTER (OUTPATIENT)
Dept: ULTRASOUND IMAGING | Facility: CLINIC | Age: 29
Discharge: HOME | End: 2019-12-11
Attending: OBSTETRICS & GYNECOLOGY | Admitting: OBSTETRICS & GYNECOLOGY
Payer: COMMERCIAL

## 2019-12-11 DIAGNOSIS — Z36.9 FIRST TRIMESTER SCREENING: Primary | ICD-10-CM

## 2019-12-11 DIAGNOSIS — O26.90 PREGNANCY RELATED CONDITION, ANTEPARTUM: ICD-10-CM

## 2019-12-11 DIAGNOSIS — Z34.01 ENCOUNTER FOR SUPERVISION OF NORMAL FIRST PREGNANCY IN FIRST TRIMESTER: ICD-10-CM

## 2019-12-11 DIAGNOSIS — Z34.01 ENCOUNTER FOR SUPERVISION OF NORMAL FIRST PREGNANCY IN FIRST TRIMESTER: Primary | ICD-10-CM

## 2019-12-11 PROCEDURE — 96040 ZZH GENETIC COUNSELING, EACH 30 MINUTES: CPT | Mod: ZF | Performed by: GENETIC COUNSELOR, MS

## 2019-12-11 PROCEDURE — 36415 COLL VENOUS BLD VENIPUNCTURE: CPT | Performed by: OBSTETRICS & GYNECOLOGY

## 2019-12-11 PROCEDURE — 76813 OB US NUCHAL MEAS 1 GEST: CPT

## 2019-12-11 PROCEDURE — 40000791 ZZHCL STATISTIC VERIFI PRENATAL TRISOMY 21,18,13: Performed by: OBSTETRICS & GYNECOLOGY

## 2019-12-11 NOTE — PROGRESS NOTES
[facility]  Genetic Counseling Consult    Patient: Ilda Hernandez YOB: 1990   Date of Service: 19      Ilda Hernandez was seen at [facility] [place] for genetic consultation to discuss the options for routine screening for fetal chromosome abnormalities. Ilda was accompanied to today's appointment by her , Saurabh.      Impression/Plan:   1.  Ilda had an ultrasound and blood draw for NIPT (Innatal test through [email or web address]).  Results are expected within 7-10 days, and will be available in [email or web address].  We will contact her to discuss the results, and a copy will be forwarded to the office of the referring OB provider.  Ilda does NOT want me to leave a detailed voicemail with her NIPT results and sex of the baby. She will call me back for results.    2. Maternal serum AFP (single marker screen) is recommended after 15 weeks to screen for open neural tube defects. A quad screen should not be performed.    Pregnancy History:   /Parity:    Age at Delivery: 30 year old  KIM: 2020, by Last Menstrual Period  Gestational Age: 11w4d    No significant complications or exposures were reported in the current pregnancy.    Medical History:   Ilda s reported medical history is not expected to impact pregnancy management or risks to fetal development.    Ilda is positive for a BRCA1 mutation, and thus has Hereditary Breast and Ovarian Cancer Syndrome. It is BRCA1 c.3783G>T ([id number]). She had this testing done in at the [facility] in .In 2018, she had a bilateral mastectomy after having a right lumpectomy with fibroadenoma prior to surgery. This mutation will not affect pregnancy management. However, we discussed the 50% chance that Ilda passes on the BRCA1 mutation to her future children. She was aware of that risk and stated that she will not make pregnancy  "management decisions based on BRCA1 mutation status of her pregnancy. We further discussed the medical indications of an adult-onset condition, as well as the autonomy of her future children in the decision making process.     BRCA1: Gene mutations in BRCA1 are associated with Hereditary Breast and Ovarian Cancer (HBOC) syndrome.  Women with mutations in this gene are at increased risk for breast cancer; this risk has been estimated to be about a 50-85% lifetime risk of developing breast cancer.  They are also at an increased risk for developing ovarian cancer. Bilateral mastectomy and oophrectomy can significantly reduce the risk for breast and ovarian cancer, respectively, in women with BRCA1 mutations.  Men with mutations in BRCA1 are thought to be at a somewhat increased risk for prostate cancer and an increased (but still small) risk for breast cancer.  Mutations in BRCA1 are also thought to be associated with pancreatic cancer and melanoma in some families.       Family History:   A three-generation pedigree was obtained, and is scanned under the  Media  tab.   The following significant findings were reported by Ilda:    Ilda's mother passed away at age 43 from leukemia. She also had breast cancer in her lifetime.    Ilda has a 25 year old brother and a 29 year old sister. Both are positive for the BRCA1 mutation, but otherwise reportedly healthy. Neither sibling has children.    Saurabh's, Ilda's , maternal grandmother passed away in her 70's, and reportedly had an \"unknown neurodegenerative disorder\" that caused her to be wheelchair bound in her 30's-40's. Further information regarding this history was not known at today's appointment.    Otherwise, the reported family history is negative for multiple miscarriages, stillbirths, birth defects, cognitive impairment, known genetic conditions, and consanguinity.       Carrier Screening:   The patient reports that she and the father of the " pregnancy have  ancestry:      Cystic fibrosis is an autosomal recessive genetic condition that occurs with increased frequency in individuals of  ancestry and carrier screening for this condition is available.  In addition,  screening in the [facility] includes cystic fibrosis.        Expanded carrier screening for mutations in a large panel of genes associated with autosomal recessive conditions including cystic fibrosis, spinal muscular atrophy, and others, is now available.      Carrier screening was not discussed today.       Risk Assessment for Chromosome Conditions:   We explained that the risk for fetal chromosome abnormalities increases with maternal age. We discussed specific features of common chromosome abnormalities, including Down syndrome, trisomy 13, trisomy 18, and sex chromosome trisomies.      - At age 29 at midtrimester, the risk to have a baby with Down syndrome is 1 in 855.    - At age 29 at midtrimester, the risk to have a baby with any chromosome abnormality is 1 in 428.          Testing Options:   We discussed the following options:   First trimester screening    First trimester ultrasound with nuchal translucency and nasal bone assessments, maternal plasma hCG, FANY-A, and AFP measurement    Screens for fetal trisomy 21, trisomy 13, and trisomy 18    Cannot screen for open neural tube defects; maternal serum AFP after 15 weeks is recommended       Non-invasive Prenatal Testing (NIPT)    Maternal plasma cell-free DNA testing; first trimester ultrasound with nuchal translucency and nasal bone assessment is recommended, when appropriate    Screens for fetal trisomy 21, trisomy 13, trisomy 18, and sex chromosome aneuploidy    Cannot screen for open neural tube defects; maternal serum AFP after 15 weeks is recommended       Genetic Amniocentesis    Invasive procedure typically performed in the second trimester by which amniotic fluid is obtained for the purpose of  chromosome analysis and/or other prenatal genetic analysis    Diagnostic results; >99% sensitivity for fetal chromosome abnormalities    AFAFP measurement tests for open neural tube defects       Comprehensive (Level II) ultrasound: Detailed ultrasound performed between 18-22 weeks gestation to screen for major birth defects and markers for aneuploidy.      We reviewed the benefits and limitations of this testing.  Screening tests provide a risk assessment specific to the pregnancy for certain fetal chromosome abnormalities, but cannot definitively diagnose or exclude a fetal chromosome abnormality.  Follow-up genetic counseling and consideration of diagnostic testing is recommended with any abnormal screening result.      It was a pleasure to be involved with [facility]. Face-to-face time of the meeting was 45 minutes.        Alisha Ervin MS, [facility]  Genetic Counselor  Maternal Fetal Medicine  [facility]   Phone: [phone]  Pager: [phone]  Email: [email or web address]

## 2019-12-11 NOTE — PROGRESS NOTES
"Please see \"Imaging\" tab under \"Chart Review\" for details of today's US at the [facility].    Bishop Hammond MD  Maternal-Fetal Medicine      "

## 2019-12-16 ENCOUNTER — TELEPHONE (OUTPATIENT)
Dept: MATERNAL FETAL MEDICINE | Facility: CLINIC | Age: 29
End: 2019-12-16

## 2019-12-16 NOTE — TELEPHONE ENCOUNTER
Called and discussed normal NIPT results with Ilda. Results indicate NO ANEUPLOIDY DETECTED for chromosomes 21, 18, 13, or sex chromosomes (XX).     This puts her current pregnancy at low risk for Down syndrome, trisomy 18, trisomy 13 and sex chromosome abnormalities. This test is reported to have the following sensitivities: Down syndrome- 99%, trisomy 18- 98%, and trisomy 13- 98%. Although these results are reassuring, this does not replace a standard chromosome analysis from a chorionic villus sampling or amniocentesis.     MSAFP is the appropriate second trimester screening test for open neural tube defects; the maternal quad screen is not recommended.    Her results are available in her Epic chart for her primary OB to review.      Alisha Ervin MS, [facility]  Genetic Counselor  Maternal Fetal Medicine  [facility]   Phone: [phone]  Pager: [phone]  Email: [email or web address]

## 2019-12-18 LAB — LAB SCANNED RESULT: NORMAL

## 2019-12-20 ENCOUNTER — TELEPHONE (OUTPATIENT)
Dept: OBGYN | Facility: CLINIC | Age: 29
End: 2019-12-20

## 2019-12-20 NOTE — TELEPHONE ENCOUNTER
Pt pregnant and would like to know if she can take gasX. Is aware she might not get called back tonight. Will call nurse advice line if does not here back by 5

## 2020-01-03 ENCOUNTER — PRENATAL OFFICE VISIT (OUTPATIENT)
Dept: OBGYN | Facility: CLINIC | Age: 30
End: 2020-01-03
Payer: COMMERCIAL

## 2020-01-03 VITALS
BODY MASS INDEX: 20.63 KG/M2 | WEIGHT: 124 LBS | DIASTOLIC BLOOD PRESSURE: 62 MMHG | OXYGEN SATURATION: 98 % | SYSTOLIC BLOOD PRESSURE: 93 MMHG | HEART RATE: 109 BPM

## 2020-01-03 DIAGNOSIS — Z34.01 ENCOUNTER FOR PRENATAL CARE IN FIRST TRIMESTER OF FIRST PREGNANCY: Primary | ICD-10-CM

## 2020-01-03 DIAGNOSIS — R21 RASH: ICD-10-CM

## 2020-01-03 PROCEDURE — 99207 ZZC PRENATAL VISIT: CPT | Performed by: OBSTETRICS & GYNECOLOGY

## 2020-01-03 RX ORDER — CLINDAMYCIN PHOSPHATE 10 UG/ML
LOTION TOPICAL 2 TIMES DAILY
Qty: 60 ML | Refills: 3 | Status: ON HOLD | OUTPATIENT
Start: 2020-01-03 | End: 2020-07-01

## 2020-01-03 NOTE — PROGRESS NOTES
Had normal NIPT and having a girl. Wt gain reviewed and doing okay. Lots of constipation issues and discussed OTC meds. Also has a rash on neck, face and back. Looks like acne so will try clindamycin cream and if not better, may need to see derm. Discussed ultrasound and AFP next visit. They have a friend who is willing to give them donor milk and want to know if that is best for baby. Will see if she can have a consult with lactation to put feeding plan in place prior to delivery. RTC 4 weeks. BE

## 2020-01-03 NOTE — Clinical Note
Can we figure out how to get a patient a lactation consult pre delivery? She has had bilateral mastectomy and wants to discuss donor milk vs formula in detail. Thanks debbie

## 2020-02-05 ENCOUNTER — PRENATAL OFFICE VISIT (OUTPATIENT)
Dept: OBGYN | Facility: CLINIC | Age: 30
End: 2020-02-05
Attending: OBSTETRICS & GYNECOLOGY
Payer: COMMERCIAL

## 2020-02-05 ENCOUNTER — ANCILLARY PROCEDURE (OUTPATIENT)
Dept: ULTRASOUND IMAGING | Facility: CLINIC | Age: 30
End: 2020-02-05
Attending: OBSTETRICS & GYNECOLOGY
Payer: COMMERCIAL

## 2020-02-05 VITALS
WEIGHT: 128 LBS | HEART RATE: 76 BPM | BODY MASS INDEX: 21.3 KG/M2 | SYSTOLIC BLOOD PRESSURE: 103 MMHG | DIASTOLIC BLOOD PRESSURE: 48 MMHG

## 2020-02-05 DIAGNOSIS — Z34.01 ENCOUNTER FOR PRENATAL CARE IN FIRST TRIMESTER OF FIRST PREGNANCY: ICD-10-CM

## 2020-02-05 DIAGNOSIS — Z34.02 ENCOUNTER FOR PRENATAL CARE IN SECOND TRIMESTER OF FIRST PREGNANCY: Primary | ICD-10-CM

## 2020-02-05 DIAGNOSIS — O44.40 LOW-LYING PLACENTA: ICD-10-CM

## 2020-02-05 PROCEDURE — 76805 OB US >/= 14 WKS SNGL FETUS: CPT | Performed by: OBSTETRICS & GYNECOLOGY

## 2020-02-05 PROCEDURE — 82105 ALPHA-FETOPROTEIN SERUM: CPT | Mod: 90 | Performed by: OBSTETRICS & GYNECOLOGY

## 2020-02-05 PROCEDURE — 36415 COLL VENOUS BLD VENIPUNCTURE: CPT | Performed by: OBSTETRICS & GYNECOLOGY

## 2020-02-05 PROCEDURE — 99207 ZZC PRENATAL VISIT: CPT | Performed by: OBSTETRICS & GYNECOLOGY

## 2020-02-05 PROCEDURE — 99000 SPECIMEN HANDLING OFFICE-LAB: CPT | Performed by: OBSTETRICS & GYNECOLOGY

## 2020-02-05 NOTE — PATIENT INSTRUCTIONS
Patient Education     Comfort Tips During Pregnancy    Talk with your healthcare provider before using pain-relieving medicine at any time during your pregnancy.  First trimester tips  Nausea    Get up slowly. Eat a few unsalted crackers before you get out of bed.    Avoid smells that bother you.    Eat small bland low fat, light high-protein meals at frequent intervals.    Sip on water, weak tea, or clear soft drinks, like ginger ale. Eat ice chips.  Fatigue    Take catnaps when you can.    Get regular exercise.    Accept help from others.    Practice good sleep habits, like going to bed and getting up at the same time each day. Use your bed only for sleep and sex.  Mood swings    Talk about your feelings with others, including other mothers.    Limit sugar, chocolate, and caffeine.    Eat a healthy diet. Don t skip meals.    Get regular exercise.  Headaches    Get fresh air and exercise.    Relax and get enough rest.    Check with your healthcare provider before taking any pain medicines.  Second trimester tips  Here are some suggestions to help you cope:    To limit ankle swelling, sit with your feet raised or wear support hose.    If you have pain in your groin and stomach (round ligament pain), avoid sudden twisting movements.    For leg cramps, flexing your foot often brings immediate relief. You also may try massaging your calf in long, downward strokes, or stretching your legs before going to bed. Get enough exercise and wear shoes with flexible soles.  Third trimester tips  Reducing heartburn    Eat small, light meals throughout the day rather than 3 large ones.    Sleep with your upper body raised 6 inches. Don t lie down until 2 hours after you eat.    Don't eat greasy, fried, or spicy foods.    Avoid citrus fruits and juices.  Treating constipation    Eat foods high in fiber (whole-grain foods, fresh fruit and vegetables).    Drink plenty of water.    Get regular exercise.    Discuss other medicines  (like docusate and psyllium) with your healthcare provider.  Taking care of your breasts    Avoid using harsh soaps or alcohol, which can cause excessive dryness.    Wear nursing bras. They provide more support than regular bras and can be used after pregnancy if you breastfeed.  Getting a good night s sleep    Take a warm shower before bed.    Sleep on a firm mattress.    Lie on your side with 1 leg crossed over the other.    Use pillows to support arms, legs, and belly.  Date Last Reviewed: 10/1/2017    4402-0863 [employer]. [address] [place] [address]. All rights reserved. This information is not intended as a substitute for professional medical care. Always follow your healthcare professional's instructions.           Patient Education     Adapting to Pregnancy: Second Trimester    Keep up the healthy habits you started in your first trimester. You might be a little more tired than normal. So plan your day wisely. Look at the tips below and choose the ones that suit your lifestyle.  If you have any questions, check with your healthcare provider.  If you work  If you can, adjust your work with your employer to fit your needs. Try these tips:    If you stand for long periods, find ways to do some tasks while sitting. Also, try to stand with 1 foot resting on a low stool or ledge. Shift your weight from foot to foot often. Wear low-heeled shoes.    If you sit, keep your knees level with your hips. Rest your feet on a firm surface. Sit tall with support for your low back.    If you work long hours, ask about adjusting your schedule. Try taking shorter breaks more often.  When you travel  The second trimester may be the best time for any travel. Talk to your healthcare provider about any special plans you may need to make. Always:    Wear a seat belt. Fasten the lap part under your belly. Wear the shoulder part also.    Take breaks often during long trips by car or plane. Move around to  stretch your legs.    Drink plenty of fluids on flights. The air in plane cabins is very dry.    Avoid hot climates or high altitudes if you are not used to them.    Avoid places where the food and water might make you sick.    Make sure you are up-to-date on all immunizations, including the flu vaccine. This is especially important when traveling overseas.  Taking time to relax  Find time to rest and relax at work or at home:    Take short time-outs daily. Do relaxation exercises.    Breathe deeply during stressful times.    Try not to take on too much. Plan tasks for times when you have the most energy.    Take naps when you can. Or just sit and relax.    After week 16, avoid lying on your back for more than a few minutes. Instead, lie on your side. Switch sides often.  Continuing as lovers  Unless your healthcare provider tells you otherwise, there is no reason to stop having sex now. Blood supply increases to the pelvic area in the second trimester. Because of this, sex might be more enjoyable. Try different positions and see what s best. Also, talk to your partner about any changes in desire. Spotting may happen after sex. Be sure to let your healthcare provider know if there is heavy bleeding.  Keeping your environment safe  You can still clean house and use scented products. Just take some simple precautions:    Wear gloves when using cleaning fluids.    Open windows to let in fresh air. Use a fan if you paint.    Avoid secondhand smoke.    Don t breathe fumes from nail polish, hair spray, cleansers, or other chemicals.  Date Last Reviewed: 1/1/2018 2000-2019 [employer] [address] [place] [address], [place] [address]. All rights reserved. This information is not intended as a substitute for professional medical care. Always follow your healthcare professional's instructions.           Patient Education     Pregnancy: Your Second Trimester Changes  Each day, you and your baby are changing and  growing together. Here s a quick look at what s happening to both of you.  How you are changing  Even when you don t notice it, your body is adapting to meet the needs of your growing baby. The changes in your body might also affect your moods.  Your body  Your uterus expands as baby grows. As the weeks go by, you will feel more pressure on your bladder, stomach, and other organs. You may notice some skin color changes on your forehead, nose, or cheeks. Freckles may darken, and moles may grow. You may notice a darker line on your abdomen between your belly button and pubic bone in the midline.  Your moods  The second trimester is often easier than the first. Still, be prepared for mood swings. These are due to the increase in hormones (chemicals that affect the way organs work) produced by your body. These mood swings are a normal part of pregnancy.  How your baby is growing          Month 4  Baby s heartbeat may be heard with a Doppler (hand-held ultrasound device) by 9 to 10 weeks. Eyebrows, eyelashes, and fingernails begin to form.  Month 5  You may feel your baby move. After a growth spurt, your baby nears 10 inches. Month 6  Baby s fingerprints have formed. Your baby weighs about 1 to 2 pounds and is about 12 inches long.   Date Last Reviewed: 1/1/2018 2000-2019 [employer]. [address] [place] [address], [place] [address]. All rights reserved. This information is not intended as a substitute for professional medical care. Always follow your healthcare professional's instructions.

## 2020-02-05 NOTE — PROGRESS NOTES
[facility]- OBGYN    CC: routine prenatal visit.    S:Ilda Hernandez is a 29 year old  at 19w4d by LMP consistent with 11w5d ultrasound who presents today for routine prenatal visit.  Patient reports she has been feeling overall well.  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  She sits down when she feels this and it immediately resolves.  She has not felt like she will faint.  Patient denies any contractions, vaginal bleeding, leakage of fluid.  She states she is unsure if she has felt fetal movement.  She denies any headache, changes in vision, chest pain, chest pressure, shortness of breath, nausea, vomiting, diarrhea.  She has had improvement in constipation with daily colace.      Pregnancy notable for:  -BRCA 1 mutation  -Rh neg  -left ovarian simple cyst    O:   Patient Vitals for the past 24 hrs:   BP Pulse Weight   20 1412 103/48 76 58.1 kg (128 lb)   ]  Exam:  General- awake, alert, answering questions appropriately, appears comfortable  CV- regular rate  Lung- breathing comfortably on room air  Ext- bilateral lower extremities with no edema    Imaging:    US OB > 14 Weeks   Order #: [id number] Accession #: [id number]   Study Notes      Asia Whitt on 2020  2:01 PM   Obstetrical Ultrasound Report  OB U/S - Fetal Survey - Transabdominal  [facility]  Referring Provider: Dr. Cherrie Wadsworth  Sonographer: Asia Whitt RDMS  Indication:  Fetal Anatomy Survey     Dating (mm/dd/yyyy):   LMP: Patient's last menstrual period was 2019.              EDC:  Estimated Date of Delivery: 2020              GA by LMP:        19w4d     Current Scan On:  20          EDC:  20              GA by Current Scan:        19w0d  The calculation of the gestational age by current scan was based on BPD, HC, AC and FL.  Anatomy Scan:  Harper gestation.  Biometry:  BPD 4.3 cm 19w0d   HC 16.1 cm 19w0d   AC 13.8 cm 19w1d    FL 2.9 cm 18w6d   Cerebellum 2.0 cm 19w0d   CM 3.9mm     NF 2.7mm     Lat Vent 5.9mm     EFW (lbs/oz) 0 lbs               9ozs     EFW (g) 269 g        Fetal heart activity: Rate and rhythm is within normal limits. Fetal heart rate: 149bpm  Fetal presentation: Cephalic  Amniotic fluid: MVP 4.6cm    Cord: 3 Vessel Cord  Placenta: Posterior, Low-lying 1.8cm from internal os, and placental lake at cord insertion  Fetal Anatomy:   Visualized with normal appearance: Head, Brain, Face, Spine, Neck, Skin, Chest, 4 Chamber Heart, LVOT, RVOT, Abdominal Wall, Gastrointestinal Tract, Stomach, Kidneys, Bladder, Extremities, Diaphragm, Face/Profile and Female  Not well visualized on today s ultrasound: nose/lips     Maternal Structures:  Cervix: The cervix appears long and closed.  Cervical Length: 3.7cm  Right Adnexa: Normal   Left Adnexa: Normal  Impression:       Growth and anatomy survey appears normal.  The face was not adequately visualized on today's study.  Harper IUP with growth at 19w 0d (+/- 7-10 days) which is consistent with menstrual dating, EDC 20.     Fetal anomalies may be present but not dectected.     Placenta is low lying within 2 cm of the internal cervical os.  Recommend repeat study in 6-8 weeks to reassess placental location and recheck nose/lip.     YOLANDA MONTALVO MD            A&P: Ilda Hernandez is a 29 year old  at 19w4d by LMP consistent with 11w5d ultrasound who presents today for routine prenatal visit, currently doing well.    (Z34.02) Encounter for prenatal care in second trimester of first pregnancy  (primary encounter diagnosis)  Comment: Reviewed ultrasound findings with patient including low lying placenta and nose/lip poor views.  Discussed repeat ultrasound.  Reviewed GCT for next visit to screen for gestational diabetes.    Plan: AFP collected today to complete genetic screening recommendations.    (O44.40) Low-lying placenta  Comment: Reviewed ultrasound findings with  placental edge 1.8cm from cervix.  Reviewed that as the pregnancy progresses and the uterus grows the distance will likely increase to 2cm or more.  If it remains low lying we would recommend a scheduled  section.    Plan: US OB >14 Weeks Follow Up    Melissa Lyle MD

## 2020-02-07 LAB
# FETUSES US: NORMAL
# FETUSES: NORMAL
AFP ADJ MOM AMN: 1.26
AFP SERPL-MCNC: 75 NG/ML
AGE - REPORTED: 29 YR
CURRENT SMOKER: NO
CURRENT SMOKER: NO
DIABETES STATUS PATIENT: NO
FAMILY MEMBER DISEASES HX: NO
FAMILY MEMBER DISEASES HX: NO
GA METHOD: NORMAL
GA METHOD: NORMAL
GA: NORMAL WK
IDDM PATIENT QL: NO
INTEGRATED SCN PATIENT-IMP: NORMAL
LMP START DATE: NORMAL
MONOCHORIONIC TWINS: NO
SERVICE CMNT-IMP: NO
SPECIMEN DRAWN SERPL: NORMAL
VALPROIC/CARBAMAZEPINE STATUS: NO
WEIGHT UNITS: NORMAL

## 2020-02-12 ENCOUNTER — TELEPHONE (OUTPATIENT)
Dept: OBGYN | Facility: CLINIC | Age: 30
End: 2020-02-12

## 2020-02-12 ENCOUNTER — OFFICE VISIT (OUTPATIENT)
Dept: PEDIATRICS | Facility: CLINIC | Age: 30
End: 2020-02-12
Payer: COMMERCIAL

## 2020-02-12 VITALS
DIASTOLIC BLOOD PRESSURE: 46 MMHG | TEMPERATURE: 100.6 F | HEART RATE: 117 BPM | WEIGHT: 130 LBS | OXYGEN SATURATION: 99 % | SYSTOLIC BLOOD PRESSURE: 78 MMHG | BODY MASS INDEX: 21.63 KG/M2

## 2020-02-12 DIAGNOSIS — J10.1 INFLUENZA A: Primary | ICD-10-CM

## 2020-02-12 DIAGNOSIS — Z33.1 PREGNANCY, INCIDENTAL: ICD-10-CM

## 2020-02-12 LAB
FLUAV+FLUBV AG SPEC QL: NEGATIVE
FLUAV+FLUBV AG SPEC QL: POSITIVE
SPECIMEN SOURCE: ABNORMAL

## 2020-02-12 PROCEDURE — 87804 INFLUENZA ASSAY W/OPTIC: CPT | Performed by: PHYSICIAN ASSISTANT

## 2020-02-12 PROCEDURE — 99213 OFFICE O/P EST LOW 20 MIN: CPT | Performed by: PHYSICIAN ASSISTANT

## 2020-02-12 RX ORDER — OSELTAMIVIR PHOSPHATE 75 MG/1
75 CAPSULE ORAL 2 TIMES DAILY
Qty: 10 CAPSULE | Refills: 0 | Status: SHIPPED | OUTPATIENT
Start: 2020-02-12 | End: 2020-03-10

## 2020-02-12 NOTE — PROGRESS NOTES
Subjective     Ilda Hernandez is a 29 year old female, 20 weeks pregnant, who presents to clinic today for the following health issues:    HPI   Acute Illness   Acute illness concerns: fatigue, fevers and coughing   Onset: 3 days     Fever: YES- today 100.6    Chills/Sweats: YES    Headache (location?): no    Sinus Pressure:no    Conjunctivitis:  no    Ear Pain: no    Rhinorrhea: YES    Congestion: no    Sore Throat: YES- feels raw     Cough: YES    Wheeze: no    Decreased Appetite: YES    Nausea: no    Vomiting: no    Diarrhea:  no    Dysuria/Freq.: no    Fatigue/Achiness: YES    Sick/Strep Exposure: YES- was on a plane this past week - [place]      Therapies Tried and outcome: nothing   Received flu shot.   Pre-K teacher  Review of Systems   ROS COMP: Constitutional, HEENT, cardiovascular, pulmonary, gi and gu systems are negative, except as otherwise noted.      Objective    BP (!) 78/46 (BP Location: Right arm, Cuff Size: Adult Regular)   Pulse 117   Temp 100.6  F (38.1  C) (Tympanic)   Wt 59 kg (130 lb)   LMP 09/21/2019   SpO2 99%   BMI 21.63 kg/m    Body mass index is 21.63 kg/m .  Physical Exam   GENERAL: alert and no distress  EYES: Eyes grossly normal to inspection, PERRL and conjunctivae and sclerae normal  HENT: ear canals and TM's normal, nose and mouth without ulcers or lesions  NECK: no adenopathy  RESP: lungs clear to auscultation - no rales, rhonchi or wheezes  CV: regular rate and rhythm, normal S1 S2, no S3 or S4, no murmur    Diagnostic Test Results:  Results for orders placed or performed in visit on 02/12/20 (from the past 24 hour(s))   Influenza A/B antigen   Result Value Ref Range    Influenza A/B Agn Specimen Nasal     Influenza A Positive (A) NEG^Negative    Influenza B Negative NEG^Negative           Assessment & Plan   1. Influenza A  Begin treatment as directed. Limit exposures.  - Influenza A/B antigen  - oseltamivir (TAMIFLU) 75 MG capsule; Take 1 capsule (75 mg) by mouth 2  times daily for 5 days  Dispense: 10 capsule; Refill: 0    2. Pregnancy, incidental    Trisha Dye PA-C  [facility]

## 2020-02-12 NOTE — TELEPHONE ENCOUNTER
Pt is 20w4d and is sick. She is unsure as to whether she needs to come in and see the OB for this or her primary, or if something can just be called in to her pharmacy for her.

## 2020-02-12 NOTE — TELEPHONE ENCOUNTER
Discussed OTC medications she can take for her cold symptoms. Advised that if she feels like she needs additional medication, she should schedule with her primary provider.  Pt verbalized understanding. Rosita Pavon RN

## 2020-03-10 ENCOUNTER — PRENATAL OFFICE VISIT (OUTPATIENT)
Dept: OBGYN | Facility: CLINIC | Age: 30
End: 2020-03-10
Payer: COMMERCIAL

## 2020-03-10 VITALS
TEMPERATURE: 96.8 F | HEART RATE: 98 BPM | BODY MASS INDEX: 21.93 KG/M2 | WEIGHT: 131.8 LBS | DIASTOLIC BLOOD PRESSURE: 65 MMHG | SYSTOLIC BLOOD PRESSURE: 97 MMHG

## 2020-03-10 DIAGNOSIS — Z34.02 ENCOUNTER FOR PRENATAL CARE IN SECOND TRIMESTER OF FIRST PREGNANCY: ICD-10-CM

## 2020-03-10 LAB
BLD GP AB SCN SERPL QL: NORMAL
GLUCOSE 1H P 50 G GLC PO SERPL-MCNC: 102 MG/DL (ref 60–129)
HGB BLD-MCNC: 10.8 G/DL (ref 11.7–15.7)

## 2020-03-10 PROCEDURE — 99207 ZZC PRENATAL VISIT: CPT | Performed by: OBSTETRICS & GYNECOLOGY

## 2020-03-10 PROCEDURE — 82950 GLUCOSE TEST: CPT | Performed by: OBSTETRICS & GYNECOLOGY

## 2020-03-10 PROCEDURE — 86850 RBC ANTIBODY SCREEN: CPT | Performed by: OBSTETRICS & GYNECOLOGY

## 2020-03-10 PROCEDURE — 36415 COLL VENOUS BLD VENIPUNCTURE: CPT | Performed by: OBSTETRICS & GYNECOLOGY

## 2020-03-10 PROCEDURE — 00000218 ZZHCL STATISTIC OBHBG - HEMOGLOBIN: Performed by: OBSTETRICS & GYNECOLOGY

## 2020-03-10 NOTE — PROGRESS NOTES
24w3d overall feeling ok, some pressure and discomfort over pubic symphasis when she walks.  Will begin with support belt and consider PT if not helping.  Good fm.  Discussed peds and CB classes.  glucola today.  RTC 4 weeks  braulio

## 2020-04-03 ENCOUNTER — PRENATAL OFFICE VISIT (OUTPATIENT)
Dept: OBGYN | Facility: CLINIC | Age: 30
End: 2020-04-03
Payer: COMMERCIAL

## 2020-04-03 ENCOUNTER — ANCILLARY PROCEDURE (OUTPATIENT)
Dept: ULTRASOUND IMAGING | Facility: CLINIC | Age: 30
End: 2020-04-03
Attending: OBSTETRICS & GYNECOLOGY
Payer: COMMERCIAL

## 2020-04-03 VITALS
BODY MASS INDEX: 22.5 KG/M2 | TEMPERATURE: 98.2 F | DIASTOLIC BLOOD PRESSURE: 60 MMHG | SYSTOLIC BLOOD PRESSURE: 112 MMHG | WEIGHT: 135.2 LBS

## 2020-04-03 DIAGNOSIS — Z34.03 ENCOUNTER FOR PRENATAL CARE IN THIRD TRIMESTER OF FIRST PREGNANCY: Primary | ICD-10-CM

## 2020-04-03 DIAGNOSIS — Z23 NEED FOR TDAP VACCINATION: ICD-10-CM

## 2020-04-03 DIAGNOSIS — O44.40 LOW-LYING PLACENTA: ICD-10-CM

## 2020-04-03 PROCEDURE — 99207 ZZC PRENATAL VISIT: CPT | Performed by: OBSTETRICS & GYNECOLOGY

## 2020-04-03 PROCEDURE — 76816 OB US FOLLOW-UP PER FETUS: CPT | Performed by: OBSTETRICS & GYNECOLOGY

## 2020-04-03 PROCEDURE — 90715 TDAP VACCINE 7 YRS/> IM: CPT | Performed by: OBSTETRICS & GYNECOLOGY

## 2020-04-03 PROCEDURE — 90471 IMMUNIZATION ADMIN: CPT | Performed by: OBSTETRICS & GYNECOLOGY

## 2020-04-03 PROCEDURE — 96372 THER/PROPH/DIAG INJ SC/IM: CPT | Performed by: OBSTETRICS & GYNECOLOGY

## 2020-04-03 NOTE — NURSING NOTE
Clinic Administered Medication Documentation      Injectable Medication Documentation    Patient was given tdap. Prior to medication administration, verified patients identity using patient s name and date of birth. Please see MAR and medication order for additional information. Patient instructed to remain in clinic for 15 minutes.      Was entire vial of medication used? Yes  Vial/Syringe: Single dose vial  Expiration Date:  4/12/2022  Was this medication supplied by the patient? No   Right deltoid  [facility]  Clinic Administered Medication Documentation      Injectable Medication Documentation    Patient was given Rhogam. Prior to medication administration, verified patients identity using patient s name and date of birth. Please see MAR and medication order for additional information. Patient instructed to remain in clinic for 15 minutes.      Was entire vial of medication used? Yes  Vial/Syringe: Single dose vial  Expiration Date:  2/7/2022  Was this medication supplied by the patient? No   Right ventrogluteal

## 2020-04-03 NOTE — PROGRESS NOTES
27w6d feeling good, no c/o.  Good fm.  Had us today, placenta 3.9cm from cervix and face seen and appears normal.  Discussed COVID precautions and hospital visitor policy.  We discussed modified OB visit schedule due to COVID as well as modified team scheduling.  Questions answered.  Will plan phone visit in 3 weeks, office in 6.  Kick counts discussed.  Tdap and rhogam given jlp

## 2020-04-24 ENCOUNTER — PRENATAL OFFICE VISIT (OUTPATIENT)
Dept: OBGYN | Facility: CLINIC | Age: 30
End: 2020-04-24
Payer: COMMERCIAL

## 2020-04-24 VITALS — WEIGHT: 134.5 LBS | BODY MASS INDEX: 22.38 KG/M2

## 2020-04-24 DIAGNOSIS — Z90.13 HISTORY OF BILATERAL MASTECTOMY: ICD-10-CM

## 2020-04-24 DIAGNOSIS — Z34.03 ENCOUNTER FOR PRENATAL CARE IN THIRD TRIMESTER OF FIRST PREGNANCY: Primary | ICD-10-CM

## 2020-04-24 PROCEDURE — 99207 ZZC PRENATAL VISIT: CPT | Performed by: OBSTETRICS & GYNECOLOGY

## 2020-04-24 NOTE — Clinical Note
Can you please schedule this patient for a clinic visit in 2 wks.  The one on the 15th is too far out.

## 2020-04-24 NOTE — Clinical Note
Can you please reach out to this patient and give her info on obtaining donor milk from the milk bank.  She had bilateral mastectomy and wants info on this.  Was supposed to get this after her last visit but it never happened   Thanks. RR   Ok to address on Monday.

## 2020-04-24 NOTE — PROGRESS NOTES
30w6d  telephone encounter ~ 17 min   No complaints. Baby is moving well. Working remotely as a teacher.   Childbirth classes? Did do some on line classes  Plan on breastfeeding? Can't d/t breast cancer and bilateral mastectomy. Wants info on milk bank, hoping to give baby donor breast milk.   Birthcontrol? Will consider, not sure yet  Gender on ultrasound? girl  Circumsion? NA  Peds doc?  Will look into   Mine Shen MD

## 2020-04-27 ENCOUNTER — HOSPITAL ENCOUNTER (OUTPATIENT)
Facility: CLINIC | Age: 30
End: 2020-04-27
Admitting: OBSTETRICS & GYNECOLOGY
Payer: COMMERCIAL

## 2020-05-04 ENCOUNTER — TELEPHONE (OUTPATIENT)
Dept: OBGYN | Facility: CLINIC | Age: 30
End: 2020-05-04

## 2020-05-04 NOTE — TELEPHONE ENCOUNTER
Reason for call:  Other   Patient called regarding (reason for call): call back  Additional comments: Patient is looking for more information on a lactation specalist.     Phone number to reach patient:  Home number on file [phone] (home)    Best Time:  any    Can we leave a detailed message on this number?  YES    Travel screening: Negative

## 2020-05-04 NOTE — TELEPHONE ENCOUNTER
TC to patient. Number given to [facility] lactation specialists. Advised to call back with further questions or concerns.   Vicky Ferrara, RN-BSN

## 2020-05-05 ENCOUNTER — COMMUNICATION - HEALTHEAST (OUTPATIENT)
Dept: ADMINISTRATIVE | Facility: CLINIC | Age: 30
End: 2020-05-05

## 2020-05-05 ENCOUNTER — RECORDS - HEALTHEAST (OUTPATIENT)
Dept: ADMINISTRATIVE | Facility: OTHER | Age: 30
End: 2020-05-05

## 2020-05-08 ENCOUNTER — PRENATAL OFFICE VISIT (OUTPATIENT)
Dept: OBGYN | Facility: CLINIC | Age: 30
End: 2020-05-08
Payer: COMMERCIAL

## 2020-05-08 VITALS
TEMPERATURE: 97.8 F | SYSTOLIC BLOOD PRESSURE: 94 MMHG | BODY MASS INDEX: 23.05 KG/M2 | WEIGHT: 138.5 LBS | DIASTOLIC BLOOD PRESSURE: 61 MMHG | HEART RATE: 89 BPM

## 2020-05-08 DIAGNOSIS — O26.843 UTERINE SIZE DATE DISCREPANCY PREGNANCY, THIRD TRIMESTER: ICD-10-CM

## 2020-05-08 DIAGNOSIS — Z34.03 ENCOUNTER FOR PRENATAL CARE IN THIRD TRIMESTER OF FIRST PREGNANCY: Primary | ICD-10-CM

## 2020-05-08 PROCEDURE — 99207 ZZC PRENATAL VISIT: CPT | Performed by: OBSTETRICS & GYNECOLOGY

## 2020-05-08 NOTE — PROGRESS NOTES
32w6d  Questions about pediatricians  Active fetal movement, possible [place], no bleeding or leaking.  Measuring size < dates, will get a growth US.  RTC 2 weeks for in person visit and growth US.  Jenifer Estrada MD

## 2020-05-09 NOTE — PATIENT INSTRUCTIONS
We recognize that this is a time of great change as you look forward to the birth of your baby. It's also a time of change for us, your healthcare providers.   We look forward to being part of your journey, even if it may be different than you expected. To best protect you and your family, we've made changes at our hospitals. As we learn more about this coronavirus (COVID-19), the points below may change.     Here is what you can expect:     We're limiting visitors to Labor & Delivery and Postpartum to just 1 adult visitor. This 1 adult may be your spouse, partner, family member, friend or . It's your choice who will be your 1 visitor. Your visitor can't have symptoms of COVID-19 (fever, cough, trouble breathing). We will take your visitor's temperature when you arrive and throughout your stay.     Your 1 visitor must stay with you in your room for your whole stay. If they leave the hospital, they can't come back in.     We ask that your visitor bring a mask from home to wear. If your visitor doesn't have a mask, the hospital will provide a mask.     You will have several food options. You and your visitor may order food through hospital room service. You may bring food with you from home when you begin your stay with us. Finally, you can have food delivered to the designated door at the hospital. You can check with hospital staff to find out where that is.     You and your visitor are not able to go outside to smoke.     Any time you come to the hospital, please have your belongings and your car seat in the car. If the hospital admits you for delivery, you can bring it all in for your stay. Please limit your luggage or bags and car seat to what you can carry in during 1 trip.     If you have a long stay in the hospital before your birth (antepartum hospitalization), we don't allow any visitors during that time. We encourage patients to work with their care teams on other options to connect and get support.      Our knowledge of COVID-19 is constantly growing. We'll keep you updated as things change. Thank you for having your baby at [facility]. We're honored to care for you and your family during this special event.

## 2020-05-20 ENCOUNTER — PRENATAL OFFICE VISIT (OUTPATIENT)
Dept: OBGYN | Facility: CLINIC | Age: 30
End: 2020-05-20
Payer: COMMERCIAL

## 2020-05-20 ENCOUNTER — ANCILLARY PROCEDURE (OUTPATIENT)
Dept: ULTRASOUND IMAGING | Facility: CLINIC | Age: 30
End: 2020-05-20
Payer: COMMERCIAL

## 2020-05-20 VITALS
DIASTOLIC BLOOD PRESSURE: 64 MMHG | TEMPERATURE: 98.4 F | HEART RATE: 80 BPM | SYSTOLIC BLOOD PRESSURE: 97 MMHG | WEIGHT: 138.3 LBS | BODY MASS INDEX: 23.01 KG/M2

## 2020-05-20 DIAGNOSIS — O26.843 UTERINE SIZE DATE DISCREPANCY PREGNANCY, THIRD TRIMESTER: ICD-10-CM

## 2020-05-20 DIAGNOSIS — Z34.03 ENCOUNTER FOR PRENATAL CARE IN THIRD TRIMESTER OF FIRST PREGNANCY: Primary | ICD-10-CM

## 2020-05-20 PROCEDURE — 76816 OB US FOLLOW-UP PER FETUS: CPT | Performed by: OBSTETRICS & GYNECOLOGY

## 2020-05-20 PROCEDURE — 99207 ZZC PRENATAL VISIT: CPT | Performed by: OBSTETRICS & GYNECOLOGY

## 2020-05-20 NOTE — PROGRESS NOTES
34w4d feeling good, no c/o.  Lots of mvmt.  Had growth scan today, baby 5lb 8oz, 62%tile.  Normal HEATHER.  Discussed option for elective IOL after 39wks and she is definitely considering it.    Discussed COVID testing policy in hospital either upon admit or 2-3 days prior to scheduled induction or surgery.  Discussed universal masking and need for support person and self to wear mask at all times when any staff in room.  Encouraged to bring masks from home if available, otherwise they will be provided by the hospital. Discussed full PPE if COVID + and limited staff interaction as appropriate.  Discussed support person can remain with her if asymptomatic and currently lives with her, if no to either, would need to leave.    Plan GBS next visit.  RTC 2 weeks  braulio

## 2020-06-03 ENCOUNTER — PRENATAL OFFICE VISIT (OUTPATIENT)
Dept: OBGYN | Facility: CLINIC | Age: 30
End: 2020-06-03
Payer: COMMERCIAL

## 2020-06-03 VITALS
DIASTOLIC BLOOD PRESSURE: 71 MMHG | BODY MASS INDEX: 22.96 KG/M2 | WEIGHT: 138 LBS | SYSTOLIC BLOOD PRESSURE: 105 MMHG | TEMPERATURE: 97.6 F | HEART RATE: 105 BPM

## 2020-06-03 DIAGNOSIS — Z34.03 ENCOUNTER FOR PRENATAL CARE IN THIRD TRIMESTER OF FIRST PREGNANCY: Primary | ICD-10-CM

## 2020-06-03 LAB — HGB BLD-MCNC: 13.1 G/DL (ref 11.7–15.7)

## 2020-06-03 PROCEDURE — 99207 ZZC PRENATAL VISIT: CPT | Performed by: OBSTETRICS & GYNECOLOGY

## 2020-06-03 PROCEDURE — 87653 STREP B DNA AMP PROBE: CPT | Performed by: OBSTETRICS & GYNECOLOGY

## 2020-06-03 PROCEDURE — 36416 COLLJ CAPILLARY BLOOD SPEC: CPT | Performed by: OBSTETRICS & GYNECOLOGY

## 2020-06-03 PROCEDURE — 00000218 ZZHCL STATISTIC OBHBG - HEMOGLOBIN: Performed by: OBSTETRICS & GYNECOLOGY

## 2020-06-03 NOTE — PROGRESS NOTES
[facility]- OBGYN    CC: routine prenatal visit.    S:  Patient reports she has been feeling overall well.  She does have some right iliac crest pain with certain movements.  Patient denies any contractions, vaginal bleeding, leakage of fluid.  She states she is feeling normal fetal movement.  She denies any headache, changes in vision, chest pain, chest pressure, shortness of breath, nausea, vomiting, diarrhea, or constipation.      O:   Patient Vitals for the past 24 hrs:   BP Temp Temp src Pulse Weight   20 1105 105/71 97.6  F (36.4  C) Oral 105 62.6 kg (138 lb)   ]  Exam:  General- awake, alert, answering questions appropriately, appears comfortable  CV- regular rate  Lung- breathing comfortably on room air  Ext- bilateral lower extremities with no edema    Doptones- 140 bpm  Fundal height- term    Cephalic by BSUS    A&P: Ilda Hernandez is a 29 year old  at 36w4d by LMP c/w 11w5d us who presents today for routine prenatal visit.    (Z34.03) Encounter for prenatal care in third trimester of first pregnancy  (primary encounter diagnosis)  Comment: Has been considering eIOL at 39 weeks.  She would prefer to see what her body naturally does and would like her cervix checked at 38 weeks.  If dilated at that point will likely have IOL.  If no progress in dilation at that point then will not proceed with induction.  Discussed GBS today.  Patient has tylenol and will get stool softener and ibuprofen OTC.  Plan: OB hemoglobin, Group B strep PCR  Weekly visits until delivery    Melissa Lyle MD

## 2020-06-03 NOTE — PATIENT INSTRUCTIONS
Patient Education     What Is Group B Strep?    Group B strep (streptococcus) is a common bacteria. It can grow in a woman s vagina, rectum, or urinary tract. It is almost always harmless in adults. But in rare cases, a woman who has group B strep can infect her baby during the birth. Infection can cause serious illness in the . The good news is that treating the mother during labor reduces the risk of the baby becoming infected. And if a  is infected, the infection can be treated.  Facts about group B strep  Learning more about group B strep can help you understand how testing and treatment can help. Here are some basic facts about group B strep:    It is not a sexually transmitted disease.    It is not the same as strep throat. (This is caused by group A strep.)    It often has no symptoms and may cause no problems in adults.    Test results can be misleading. They may be positive one week and negative the next week.    Group B strep can be transmitted during vaginal delivery. It cannot be passed during  (surgical) birth.    A mother with group B strep rarely infects her . (Infection happens only about 1% to 2% of the time.)    When a mother is treated during labor and delivery, her baby almost never becomes infected.    Certain factors during pregnancy increase the risk of a baby becoming infected.  Possible effects on your baby  Group B strep can infect the blood. It can also cause inflammation of the baby s lungs, brain, or spinal cord. Long-term effects can include blindness, deafness, mental retardation, or cerebral palsy. And in rare cases, infection causes death. Infection is most often found soon after the baby is born.     How your baby may become infected  Group B strep often lives in the vagina or rectum. If the amniotic sac breaks early, bacteria from the vagina can travel to the uterus, reaching the baby. Or, as the baby passes through the birth canal, it can come in  contact with the bacteria. In rare cases, group B strep can also be passed to the baby after delivery. This is called late-onset group B strep. The source of this type of infection is not well understood. But some experts believe that it happens if the baby is exposed to group B strep in the home, from the parents or siblings, or in the community.  What increases the risk?  Certain risk factors increase the chance that a baby will be infected. They include:    Breaking or leaking of the amniotic sac earlier than 37 weeks gestation    Labor earlier than 37 weeks gestation    Breaking of the amniotic sac more than 18 hours before labor begins    Fever during labor    A urinary tract infection with group B strep at any point in the pregnancy    A previous baby born with a group B strep infection  Date Last Reviewed: 6/1/2016 2000-2019 [employer]. [address] [place] [address], [place] [address]. All rights reserved. This information is not intended as a substitute for professional medical care. Always follow your healthcare professional's instructions.

## 2020-06-04 LAB
GP B STREP DNA SPEC QL NAA+PROBE: NEGATIVE
SPECIMEN SOURCE: NORMAL

## 2020-06-10 ENCOUNTER — PRENATAL OFFICE VISIT (OUTPATIENT)
Dept: OBGYN | Facility: CLINIC | Age: 30
End: 2020-06-10
Payer: COMMERCIAL

## 2020-06-10 VITALS
TEMPERATURE: 96.8 F | HEIGHT: 65 IN | WEIGHT: 139.2 LBS | BODY MASS INDEX: 23.19 KG/M2 | OXYGEN SATURATION: 98 % | SYSTOLIC BLOOD PRESSURE: 89 MMHG | HEART RATE: 69 BPM | DIASTOLIC BLOOD PRESSURE: 57 MMHG

## 2020-06-10 DIAGNOSIS — Z34.03 ENCOUNTER FOR PRENATAL CARE IN THIRD TRIMESTER OF FIRST PREGNANCY: Primary | ICD-10-CM

## 2020-06-10 PROCEDURE — 99207 ZZC PRENATAL VISIT: CPT | Performed by: OBSTETRICS & GYNECOLOGY

## 2020-06-10 ASSESSMENT — MIFFLIN-ST. JEOR: SCORE: 1357.29

## 2020-06-10 NOTE — PROGRESS NOTES
GBS: Negative  Hemoglobin   Date Value Ref Range Status   06/03/2020 13.1 11.7 - 15.7 g/dL Final   ]    Breast pump rx: NO, s/p mastectomy. Plans formula, has some donor milk.  Labor orders: signed and held  Birth plan: epidural. Go with the flow.  Length of stay: discussed.  Disability paperwork: NA  Resident involvement: discussed and agrees.  PP meds OTC    Doing well.   Good fetal movement.   Poss mild contractions versus hard fetal parts.   RTC weekly.

## 2020-06-11 RX ORDER — METHYLERGONOVINE MALEATE 0.2 MG/ML
200 INJECTION INTRAVENOUS
Status: CANCELLED | OUTPATIENT
Start: 2020-06-11

## 2020-06-11 RX ORDER — ACETAMINOPHEN 325 MG/1
650 TABLET ORAL EVERY 6 HOURS PRN
COMMUNITY
Start: 2020-06-11 | End: 2020-07-11

## 2020-06-11 RX ORDER — FENTANYL CITRATE 50 UG/ML
50-100 INJECTION, SOLUTION INTRAMUSCULAR; INTRAVENOUS
Status: CANCELLED | OUTPATIENT
Start: 2020-06-11

## 2020-06-11 RX ORDER — IBUPROFEN 600 MG/1
600 TABLET, FILM COATED ORAL EVERY 6 HOURS PRN
COMMUNITY
Start: 2020-06-11 | End: 2020-07-11

## 2020-06-11 RX ORDER — CARBOPROST TROMETHAMINE 250 UG/ML
250 INJECTION, SOLUTION INTRAMUSCULAR
Status: CANCELLED | OUTPATIENT
Start: 2020-06-11

## 2020-06-11 RX ORDER — OXYCODONE AND ACETAMINOPHEN 5; 325 MG/1; MG/1
1 TABLET ORAL
Status: CANCELLED | OUTPATIENT
Start: 2020-06-11

## 2020-06-11 RX ORDER — LIDOCAINE 40 MG/G
CREAM TOPICAL
Status: CANCELLED | OUTPATIENT
Start: 2020-06-11

## 2020-06-11 RX ORDER — SODIUM CHLORIDE, SODIUM LACTATE, POTASSIUM CHLORIDE, CALCIUM CHLORIDE 600; 310; 30; 20 MG/100ML; MG/100ML; MG/100ML; MG/100ML
INJECTION, SOLUTION INTRAVENOUS CONTINUOUS
Status: CANCELLED | OUTPATIENT
Start: 2020-06-11

## 2020-06-11 RX ORDER — OXYTOCIN 10 [USP'U]/ML
10 INJECTION, SOLUTION INTRAMUSCULAR; INTRAVENOUS
Status: CANCELLED | OUTPATIENT
Start: 2020-06-11

## 2020-06-11 RX ORDER — AMOXICILLIN 250 MG
1 CAPSULE ORAL DAILY
COMMUNITY
Start: 2020-06-11 | End: 2020-06-25

## 2020-06-11 RX ORDER — OXYTOCIN/0.9 % SODIUM CHLORIDE 30/500 ML
100-340 PLASTIC BAG, INJECTION (ML) INTRAVENOUS CONTINUOUS PRN
Status: CANCELLED | OUTPATIENT
Start: 2020-06-11

## 2020-06-11 RX ORDER — ONDANSETRON 2 MG/ML
4 INJECTION INTRAMUSCULAR; INTRAVENOUS EVERY 6 HOURS PRN
Status: CANCELLED | OUTPATIENT
Start: 2020-06-11

## 2020-06-11 RX ORDER — ACETAMINOPHEN 325 MG/1
650 TABLET ORAL EVERY 4 HOURS PRN
Status: CANCELLED | OUTPATIENT
Start: 2020-06-11

## 2020-06-11 RX ORDER — IBUPROFEN 200 MG
800 TABLET ORAL
Status: CANCELLED | OUTPATIENT
Start: 2020-06-11

## 2020-06-11 RX ORDER — NALOXONE HYDROCHLORIDE 0.4 MG/ML
.1-.4 INJECTION, SOLUTION INTRAMUSCULAR; INTRAVENOUS; SUBCUTANEOUS
Status: CANCELLED | OUTPATIENT
Start: 2020-06-11

## 2020-06-17 ENCOUNTER — PRENATAL OFFICE VISIT (OUTPATIENT)
Dept: OBGYN | Facility: CLINIC | Age: 30
End: 2020-06-17
Payer: COMMERCIAL

## 2020-06-17 VITALS
TEMPERATURE: 97.9 F | HEART RATE: 75 BPM | SYSTOLIC BLOOD PRESSURE: 94 MMHG | DIASTOLIC BLOOD PRESSURE: 64 MMHG | OXYGEN SATURATION: 96 % | BODY MASS INDEX: 23.11 KG/M2 | HEIGHT: 65 IN | WEIGHT: 138.7 LBS

## 2020-06-17 DIAGNOSIS — Z34.03 ENCOUNTER FOR PRENATAL CARE IN THIRD TRIMESTER OF FIRST PREGNANCY: Primary | ICD-10-CM

## 2020-06-17 PROCEDURE — 99207 ZZC PRENATAL VISIT: CPT | Performed by: OBSTETRICS & GYNECOLOGY

## 2020-06-17 ASSESSMENT — MIFFLIN-ST. JEOR: SCORE: 1355.02

## 2020-06-17 NOTE — PROGRESS NOTES
cx 1.5/70/-1 and soft today. Melara=7 and she had been told about IOL process. Reviewed not favorable enough yet but maybe next week if still pregnant could schedule that. Has all labor info and questions answered. Having some contractions vs fetal movement. RTC as scheduled. BE

## 2020-06-25 ENCOUNTER — PRENATAL OFFICE VISIT (OUTPATIENT)
Dept: OBGYN | Facility: CLINIC | Age: 30
End: 2020-06-25
Payer: COMMERCIAL

## 2020-06-25 VITALS
BODY MASS INDEX: 22.96 KG/M2 | SYSTOLIC BLOOD PRESSURE: 98 MMHG | TEMPERATURE: 98.7 F | WEIGHT: 138 LBS | DIASTOLIC BLOOD PRESSURE: 65 MMHG | HEART RATE: 87 BPM

## 2020-06-25 DIAGNOSIS — Z34.03 ENCOUNTER FOR PRENATAL CARE IN THIRD TRIMESTER OF FIRST PREGNANCY: Primary | ICD-10-CM

## 2020-06-25 PROCEDURE — 99207 ZZC PRENATAL VISIT: CPT | Performed by: OBSTETRICS & GYNECOLOGY

## 2020-06-25 NOTE — PROGRESS NOTES
39w5d  Active fetal movement. Rare contractions, no leaking or bleeding.  Fetal head is low and cervix is posterior, did not really tolerate exam.   RTC 1 week, sooner PRN.  Jenifer Estrada MD

## 2020-06-29 ENCOUNTER — PRENATAL OFFICE VISIT (OUTPATIENT)
Dept: OBGYN | Facility: CLINIC | Age: 30
End: 2020-06-29
Payer: COMMERCIAL

## 2020-06-29 VITALS
DIASTOLIC BLOOD PRESSURE: 56 MMHG | SYSTOLIC BLOOD PRESSURE: 91 MMHG | WEIGHT: 138 LBS | BODY MASS INDEX: 22.96 KG/M2 | OXYGEN SATURATION: 99 % | HEART RATE: 107 BPM

## 2020-06-29 DIAGNOSIS — Z34.03 ENCOUNTER FOR PRENATAL CARE IN THIRD TRIMESTER OF FIRST PREGNANCY: Primary | ICD-10-CM

## 2020-06-29 PROCEDURE — 99207 ZZC PRENATAL VISIT: CPT | Performed by: OBSTETRICS & GYNECOLOGY

## 2020-06-29 NOTE — PROGRESS NOTES
Some nausea and mild contractions noted. Now moved out of house and in an extended stay motel and then they are moving to [place] after wedding in [place]. Would like to be done anytime now since travel depends on having the baby. cx today 2-2.5/70/0 posterior and soft. Discussed pitocin and scheduled for 7/1 when I am on call per her desire. BE

## 2020-07-01 ENCOUNTER — ANESTHESIA EVENT (OUTPATIENT)
Dept: OBGYN | Facility: CLINIC | Age: 30
End: 2020-07-01
Payer: COMMERCIAL

## 2020-07-01 ENCOUNTER — ANESTHESIA (OUTPATIENT)
Dept: OBGYN | Facility: CLINIC | Age: 30
End: 2020-07-01
Payer: COMMERCIAL

## 2020-07-01 ENCOUNTER — HOSPITAL ENCOUNTER (INPATIENT)
Facility: CLINIC | Age: 30
LOS: 2 days | Discharge: HOME HEALTH | End: 2020-07-03
Attending: OBSTETRICS & GYNECOLOGY | Admitting: OBSTETRICS & GYNECOLOGY
Payer: COMMERCIAL

## 2020-07-01 LAB
ABO + RH BLD: ABNORMAL
ABO + RH BLD: ABNORMAL
BLD GP AB INVEST PLASRBC-IMP: ABNORMAL
BLD GP AB SCN SERPL QL: ABNORMAL
BLOOD BANK CMNT PATIENT-IMP: ABNORMAL
BLOOD BANK CMNT PATIENT-IMP: ABNORMAL
HGB BLD-MCNC: 12.8 G/DL (ref 11.7–15.7)
PLATELET # BLD AUTO: 261 10E9/L (ref 150–450)
SPECIMEN EXP DATE BLD: ABNORMAL
T PALLIDUM AB SER QL: NONREACTIVE

## 2020-07-01 PROCEDURE — 25800030 ZZH RX IP 258 OP 636: Performed by: OBSTETRICS & GYNECOLOGY

## 2020-07-01 PROCEDURE — 85049 AUTOMATED PLATELET COUNT: CPT | Performed by: OBSTETRICS & GYNECOLOGY

## 2020-07-01 PROCEDURE — 86850 RBC ANTIBODY SCREEN: CPT | Performed by: OBSTETRICS & GYNECOLOGY

## 2020-07-01 PROCEDURE — 25000128 H RX IP 250 OP 636: Performed by: STUDENT IN AN ORGANIZED HEALTH CARE EDUCATION/TRAINING PROGRAM

## 2020-07-01 PROCEDURE — 85018 HEMOGLOBIN: CPT | Performed by: OBSTETRICS & GYNECOLOGY

## 2020-07-01 PROCEDURE — 3E0R3BZ INTRODUCTION OF ANESTHETIC AGENT INTO SPINAL CANAL, PERCUTANEOUS APPROACH: ICD-10-PCS | Performed by: ANESTHESIOLOGY

## 2020-07-01 PROCEDURE — 10907ZC DRAINAGE OF AMNIOTIC FLUID, THERAPEUTIC FROM PRODUCTS OF CONCEPTION, VIA NATURAL OR ARTIFICIAL OPENING: ICD-10-PCS | Performed by: OBSTETRICS & GYNECOLOGY

## 2020-07-01 PROCEDURE — 25000125 ZZHC RX 250: Performed by: OBSTETRICS & GYNECOLOGY

## 2020-07-01 PROCEDURE — 86870 RBC ANTIBODY IDENTIFICATION: CPT | Performed by: OBSTETRICS & GYNECOLOGY

## 2020-07-01 PROCEDURE — 00HU33Z INSERTION OF INFUSION DEVICE INTO SPINAL CANAL, PERCUTANEOUS APPROACH: ICD-10-PCS | Performed by: ANESTHESIOLOGY

## 2020-07-01 PROCEDURE — 12000001 ZZH R&B MED SURG/OB UMMC

## 2020-07-01 PROCEDURE — 25000125 ZZHC RX 250: Performed by: STUDENT IN AN ORGANIZED HEALTH CARE EDUCATION/TRAINING PROGRAM

## 2020-07-01 PROCEDURE — 86901 BLOOD TYPING SEROLOGIC RH(D): CPT | Performed by: OBSTETRICS & GYNECOLOGY

## 2020-07-01 PROCEDURE — 86780 TREPONEMA PALLIDUM: CPT | Performed by: OBSTETRICS & GYNECOLOGY

## 2020-07-01 PROCEDURE — 86900 BLOOD TYPING SEROLOGIC ABO: CPT | Performed by: OBSTETRICS & GYNECOLOGY

## 2020-07-01 PROCEDURE — 3E033VJ INTRODUCTION OF OTHER HORMONE INTO PERIPHERAL VEIN, PERCUTANEOUS APPROACH: ICD-10-PCS | Performed by: OBSTETRICS & GYNECOLOGY

## 2020-07-01 PROCEDURE — 0KQM0ZZ REPAIR PERINEUM MUSCLE, OPEN APPROACH: ICD-10-PCS | Performed by: OBSTETRICS & GYNECOLOGY

## 2020-07-01 PROCEDURE — 40000671 ZZH STATISTIC ANESTHESIA CASE

## 2020-07-01 RX ORDER — NALOXONE HYDROCHLORIDE 0.4 MG/ML
.1-.4 INJECTION, SOLUTION INTRAMUSCULAR; INTRAVENOUS; SUBCUTANEOUS
Status: DISCONTINUED | OUTPATIENT
Start: 2020-07-01 | End: 2020-07-02

## 2020-07-01 RX ORDER — MISOPROSTOL 200 UG/1
TABLET ORAL
Status: DISCONTINUED
Start: 2020-07-01 | End: 2020-07-02 | Stop reason: HOSPADM

## 2020-07-01 RX ORDER — OXYTOCIN 10 [USP'U]/ML
10 INJECTION, SOLUTION INTRAMUSCULAR; INTRAVENOUS
Status: DISCONTINUED | OUTPATIENT
Start: 2020-07-01 | End: 2020-07-02

## 2020-07-01 RX ORDER — SODIUM CHLORIDE, SODIUM LACTATE, POTASSIUM CHLORIDE, CALCIUM CHLORIDE 600; 310; 30; 20 MG/100ML; MG/100ML; MG/100ML; MG/100ML
INJECTION, SOLUTION INTRAVENOUS CONTINUOUS
Status: DISCONTINUED | OUTPATIENT
Start: 2020-07-01 | End: 2020-07-02

## 2020-07-01 RX ORDER — CARBOPROST TROMETHAMINE 250 UG/ML
250 INJECTION, SOLUTION INTRAMUSCULAR
Status: DISCONTINUED | OUTPATIENT
Start: 2020-07-01 | End: 2020-07-02

## 2020-07-01 RX ORDER — LIDOCAINE HYDROCHLORIDE AND EPINEPHRINE 15; 5 MG/ML; UG/ML
INJECTION, SOLUTION EPIDURAL PRN
Status: DISCONTINUED | OUTPATIENT
Start: 2020-07-01 | End: 2020-07-01

## 2020-07-01 RX ORDER — LIDOCAINE 40 MG/G
CREAM TOPICAL
Status: DISCONTINUED | OUTPATIENT
Start: 2020-07-01 | End: 2020-07-01

## 2020-07-01 RX ORDER — EPHEDRINE SULFATE 50 MG/ML
5 INJECTION, SOLUTION INTRAMUSCULAR; INTRAVENOUS; SUBCUTANEOUS
Status: DISCONTINUED | OUTPATIENT
Start: 2020-07-01 | End: 2020-07-02

## 2020-07-01 RX ORDER — OXYCODONE AND ACETAMINOPHEN 5; 325 MG/1; MG/1
1 TABLET ORAL
Status: DISCONTINUED | OUTPATIENT
Start: 2020-07-01 | End: 2020-07-02

## 2020-07-01 RX ORDER — OXYTOCIN/0.9 % SODIUM CHLORIDE 30/500 ML
100-340 PLASTIC BAG, INJECTION (ML) INTRAVENOUS CONTINUOUS PRN
Status: COMPLETED | OUTPATIENT
Start: 2020-07-01 | End: 2020-07-02

## 2020-07-01 RX ORDER — ACETAMINOPHEN 325 MG/1
650 TABLET ORAL EVERY 4 HOURS PRN
Status: DISCONTINUED | OUTPATIENT
Start: 2020-07-01 | End: 2020-07-02

## 2020-07-01 RX ORDER — NALBUPHINE HYDROCHLORIDE 20 MG/ML
2.5-5 INJECTION, SOLUTION INTRAMUSCULAR; INTRAVENOUS; SUBCUTANEOUS EVERY 6 HOURS PRN
Status: DISCONTINUED | OUTPATIENT
Start: 2020-07-01 | End: 2020-07-02

## 2020-07-01 RX ORDER — IBUPROFEN 800 MG/1
800 TABLET, FILM COATED ORAL
Status: DISCONTINUED | OUTPATIENT
Start: 2020-07-01 | End: 2020-07-02

## 2020-07-01 RX ORDER — METHYLERGONOVINE MALEATE 0.2 MG/ML
200 INJECTION INTRAVENOUS
Status: DISCONTINUED | OUTPATIENT
Start: 2020-07-01 | End: 2020-07-02

## 2020-07-01 RX ORDER — LIDOCAINE 40 MG/G
CREAM TOPICAL
Status: DISCONTINUED | OUTPATIENT
Start: 2020-07-01 | End: 2020-07-02

## 2020-07-01 RX ORDER — OXYTOCIN/0.9 % SODIUM CHLORIDE 30/500 ML
1-24 PLASTIC BAG, INJECTION (ML) INTRAVENOUS CONTINUOUS
Status: DISCONTINUED | OUTPATIENT
Start: 2020-07-01 | End: 2020-07-02

## 2020-07-01 RX ORDER — FENTANYL CITRATE 50 UG/ML
50-100 INJECTION, SOLUTION INTRAMUSCULAR; INTRAVENOUS
Status: DISCONTINUED | OUTPATIENT
Start: 2020-07-01 | End: 2020-07-02

## 2020-07-01 RX ORDER — ONDANSETRON 2 MG/ML
4 INJECTION INTRAMUSCULAR; INTRAVENOUS EVERY 6 HOURS PRN
Status: DISCONTINUED | OUTPATIENT
Start: 2020-07-01 | End: 2020-07-02

## 2020-07-01 RX ORDER — NALOXONE HYDROCHLORIDE 0.4 MG/ML
.1-.4 INJECTION, SOLUTION INTRAMUSCULAR; INTRAVENOUS; SUBCUTANEOUS
Status: DISCONTINUED | OUTPATIENT
Start: 2020-07-01 | End: 2020-07-01

## 2020-07-01 RX ORDER — LIDOCAINE HYDROCHLORIDE 10 MG/ML
INJECTION, SOLUTION EPIDURAL; INFILTRATION; INTRACAUDAL; PERINEURAL PRN
Status: DISCONTINUED | OUTPATIENT
Start: 2020-07-01 | End: 2020-07-01

## 2020-07-01 RX ADMIN — Medication 2 MILLI-UNITS/MIN: at 10:28

## 2020-07-01 RX ADMIN — LIDOCAINE HYDROCHLORIDE,EPINEPHRINE BITARTRATE 3 ML: 15; .005 INJECTION, SOLUTION EPIDURAL; INFILTRATION; INTRACAUDAL; PERINEURAL at 16:13

## 2020-07-01 RX ADMIN — LIDOCAINE HYDROCHLORIDE,EPINEPHRINE BITARTRATE 2 ML: 15; .005 INJECTION, SOLUTION EPIDURAL; INFILTRATION; INTRACAUDAL; PERINEURAL at 16:15

## 2020-07-01 RX ADMIN — SODIUM CHLORIDE, POTASSIUM CHLORIDE, SODIUM LACTATE AND CALCIUM CHLORIDE: 600; 310; 30; 20 INJECTION, SOLUTION INTRAVENOUS at 10:26

## 2020-07-01 RX ADMIN — Medication: at 16:30

## 2020-07-01 RX ADMIN — LIDOCAINE HYDROCHLORIDE 1 ML: 10 INJECTION, SOLUTION EPIDURAL; INFILTRATION; INTRACAUDAL; PERINEURAL at 16:16

## 2020-07-01 NOTE — H&P
L&D History and Physical   2020   Ilda Hernandez  [id number]      HPI: Ilda Hernandez is a 30 year old  at 40w4d by LMP c/w 11w5d US who presents today for elective IOL with a favorable cervix.      She states that she is feeling well today.  She denies headache, vision changes, chest pain, shortness of breath, fever, chills, nausea, vomiting or other systemic complaints. She denies vaginal bleeding or loss of fluid and is feeling normal fetal movement.     Excited to meet baby Tess Raza! Partner supportive at bedside.       Her pregnancy is notable for:  Rh negativity status  BRCA 1 carrier, s/p double mastectomy, will not be breastfeeding  Plans epidural    ROS: No headaches, vision changes, nausea, vomiting, fevers, chills, chest pain, SOB,  abdominal pain, constipation, diarrhea, dysuria, changes in vaginal discharge or edema in extremities noted.     OBHX:   OB History    Para Term  AB Living   1 0 0 0 0 0   SAB TAB Ectopic Multiple Live Births   0 0 0 0 0      # Outcome Date GA Lbr Guy/2nd Weight Sex Delivery Anes PTL Lv   1 Current                Past Medical History:   Diagnosis Date     BRCA1 positive 2011    [id number], identified using site specific testing through [facility] cl[facility]     PONV (postoperative nausea and vomiting)        Past Surgical History:   Procedure Laterality Date     BREAST LUMPECTOMY, RT/LT Left 2012    fibroadenoma     MASTECTOMY, NIPPLE SPARING Bilateral 2018    Procedure: MASTECTOMY, NIPPLE SPARING;  Bilateral Non Nipple Sparing Mastectomy with Bilateral Breast Reconstruction with Tisssue Expanders and Alloderm, Anesthesia Block;  Surgeon: Jose Daniel Henderson MD;  Location: [facility] OR     RECONSTRUCT BREAST BILATERAL Bilateral 2018    Procedure: RECONSTRUCT BREAST BILATERAL;;  Surgeon: Mahamed Grace MD;  Location: [facility] OR     RECONSTRUCT BREAST BILATERAL  2018     RECONSTRUCT BREAST BILATERAL  2018       Medications:   No  current facility-administered medications on file prior to encounter.   Prenatal Vit-Fe Fumarate-FA (PRENATAL VITAMIN PO),   acetaminophen (TYLENOL) 325 MG tablet, Take 2 tablets (650 mg) by mouth every 6 hours as needed for mild pain Start after Delivery.  Docusate Sodium (COLACE PO),   ibuprofen (ADVIL/MOTRIN) 600 MG tablet, Take 1 tablet (600 mg) by mouth every 6 hours as needed for moderate pain Start after delivery        No Known Allergies    Family History   Problem Relation Age of Onset     Breast Cancer Mother 43     Hereditary Breast and Ovarian Cancer Syndrome Mother         BRCA1+     Breast Cancer Maternal Grandmother      Heart Disease Maternal Grandmother        SocialHX:   Social History     Tobacco Use     Smoking status: Never Smoker     Smokeless tobacco: Never Used   Substance Use Topics     Alcohol use: Not Currently     Comment: maybe 1/2 to 1 per week     Drug use: No       ROS: 10-point ROS negative except as indicated in HPI.    Physical Exam:  Vitals:    07/01/20 0858 07/01/20 0900   BP:  107/67   Resp:  18   Temp: 98.3  F (36.8  C)    TempSrc: Oral      General: alert, oriented female, resting in bed in NAD  CV: regular rate and rhythm  Lungs: clear bilaterally, no crackles or wheezes.   Abdomen: soft, gravid, non-tender, EFW 7#.  Extremities: bilateral lower extremities non-tender with trace edema    SVE: Deferred, last checked in clinic was 2-2.5/70/0 on 6/29  Membranes: Intact    Presentation: Cephalic by BSUS    FHT: baseline 130, moderate variability, accelerations present, decelerations absent   [place]: 2-4 contractions in ten minutes    Prenatal ultrasounds:  Most recently 5/20/20:    Fetal heart rate: 127 bpm  Fetal presentation: Cephalic  Amniotic fluid: 4.5cm MVP  Placenta: posterior      Impression:  Cephalic fetus.  EFW 5# 8oz, 2498 gm, 62nd growth percentile.  MVP 4.       Prenatal Labs:   Lab Results   Component Value Date    ABO PENDING 07/01/2020    RH Neg 11/05/2019    AS  PENDING 2020    HEPBANG Nonreactive 2019    CHPCRT Negative 2019    GCPCRT Negative 2019    HGB 12.8 2020       GBS Status:   Lab Results   Component Value Date    GBS Negative 2020       Lab Results   Component Value Date    PAP NIL 2018       Labs:   Results for orders placed or performed during the hospital encounter of 20 (from the past 24 hour(s))   ABO/Rh type and screen   Result Value Ref Range    ABO PENDING     Antibody Screen PENDING     Test Valid Only At          [facility]    Specimen Expires 2020    Hemoglobin   Result Value Ref Range    Hemoglobin 12.8 11.7 - 15.7 g/dL   Platelet count   Result Value Ref Range    Platelet Count 261 150 - 450 10e9/L     Assessment: 30 year old  at 40w3d by LMP c/w 11w5d US, here for elective IOL. Posterior placenta. BMI 22.96. EFW 7#.   Pregnancy also complicated by: rh negativity status, BRCA 1 carrier, s/p double mastectomy     Plan:  #PNC:     -Rh negative, Rubella immune, GCT passed. Rhogam PP.  -GBS neg  -Placenta: posterior     # Induction of Labor  - Admit for IOL in the setting of favorable cervix, pitocin and AROM PRN   - Membranes: intact  - Augmentation: AROM, pitocin now on at 2mu/min    - Labs: CBC, T&S, RPR   - GBS neg; Antibiotics not indicated                - Pain Control: epidural per pt request                - Vistaril and IM Morphine for therapeutic sleep               - Desires epidural in active labor.   - Diet: regular   - PPH Meds: standard   - PPx: SCDs     #Fetal Well Being  -Category I FHT.  Continue EFM.      #Postpartum planning  -Plans:   --Feeding: bottle   --Contraception:  Not discussed           Patient seen and care plan discussed under supervision of Dr. Wadsworth.     MARY Gibson MD  Obstetrics and Gyncology, PGY-2  20  10:39 AM     Physician Attestation   I, YOLANDA WADSWORTH MD, saw this patient with the  resident and agree with the resident/fellow's findings and plan of care as documented in the note.      I personally reviewed vital signs and fetal tracing.    Key findings: NST reactive. eIOL for favorable cervix and they are moving, desires delivery now. Pitocin started and plans epidural, then AROM. GBS neg. Anticipate .    YOLANDA MONTALVO MD  Date of Service (when I saw the patient): 20

## 2020-07-01 NOTE — ANESTHESIA PREPROCEDURE EVALUATION
"Anesthesia Pre-Procedure Evaluation    Patient: Ilda Hernandez   MRN:     [id number] Gender:   female   Age:    30 year old :      1990        Preoperative Diagnosis: * No surgery found *        LABS:  CBC:   Lab Results   Component Value Date    WBC 6.9 2019    WBC 7.1 2019    HGB 12.8 2020    HGB 13.1 2020    HCT 40.3 2019    HCT 38.5 2019     2020     2019     BMP:   Lab Results   Component Value Date     2019    POTASSIUM 4.2 2019    CHLORIDE 109 2019    CO2 25 2019    BUN 17 2019    CR 0.58 2019    GLC 96 2019     COAGS:   Lab Results   Component Value Date    PTT 33 2019    INR 1.02 2019     POC:   Lab Results   Component Value Date    BGM 90 2018    HCG Positive (A) 2019     OTHER:   Lab Results   Component Value Date    HAROON 8.7 2019    ALBUMIN 3.9 2019    PROTTOTAL 7.5 2019    ALT 28 2019    AST 27 2019    ALKPHOS 42 2019    BILITOTAL 0.6 2019    LIPASE 144 2019        Preop Vitals    BP Readings from Last 3 Encounters:   20 110/67   20 91/56   20 98/65    Pulse Readings from Last 3 Encounters:   20 107   20 87   20 75      Resp Readings from Last 3 Encounters:   20 16   18 16   18 15    SpO2 Readings from Last 3 Encounters:   20 99%   20 96%   06/10/20 98%      Temp Readings from Last 1 Encounters:   20 36.5  C (97.7  F) (Oral)    Ht Readings from Last 1 Encounters:   20 1.651 m (5' 5\")      Wt Readings from Last 1 Encounters:   20 62.6 kg (138 lb)    Estimated body mass index is 22.96 kg/m  as calculated from the following:    Height as of 20: 1.651 m (5' 5\").    Weight as of 20: 62.6 kg (138 lb).     LDA:  Peripheral IV 20 Left Lower forearm (Active)   Number of days: 0       Closed/Suction Drain Left Breast Bulb 15 " Serbian (Active)   Number of days: 751       Closed/Suction Drain Right Breast Bulb 15 Serbian (Active)   Number of days: 751        Past Medical History:   Diagnosis Date     BRCA1 positive 11/23/2011    [id number], identified using site specific testing through [facility] clii     PONV (postoperative nausea and vomiting)       Past Surgical History:   Procedure Laterality Date     BREAST LUMPECTOMY, RT/LT Left 01/2012    fibroadenoma     MASTECTOMY, NIPPLE SPARING Bilateral 6/11/2018    Procedure: MASTECTOMY, NIPPLE SPARING;  Bilateral Non Nipple Sparing Mastectomy with Bilateral Breast Reconstruction with Tisssue Expanders and Alloderm, Anesthesia Block;  Surgeon: Jose Daniel Henderson MD;  Location: [facility]     RECONSTRUCT BREAST BILATERAL Bilateral 6/11/2018    Procedure: RECONSTRUCT BREAST BILATERAL;;  Surgeon: Mahamed Grace MD;  Location: [facility] OR     RECONSTRUCT BREAST BILATERAL  08/2018     RECONSTRUCT BREAST BILATERAL  11/2018      No Known Allergies     Anesthesia Evaluation       history and physical reviewed .      History of anesthetic complications          ROS/MED HX    ENT/Pulmonary:  - neg pulmonary ROS     Neurologic:  - neg neurologic ROS     Cardiovascular:  - neg cardiovascular ROS       METS/Exercise Tolerance:     Hematologic:         Musculoskeletal:         GI/Hepatic:  - neg GI/hepatic ROS       Renal/Genitourinary:         Endo:         Psychiatric:         Infectious Disease:         Malignancy:         Other:                     JZG FV AN PHYSICAL EXAM    Assessment:   ASA SCORE: 2            PONV Management:   Adult Risk Factors: Female, H/o PONV or Motion Sickness             neg OB ROS             Penelope Gonzalez MD

## 2020-07-01 NOTE — PROGRESS NOTES
[facility]  Labor Progress Note    S:  Patient feeling good now that epidural is in, still appreciating contractions but not as painful. Denies feeling of needing to defecate or constant pressure. Prefers to limit cervical exams.     O:   Patient Vitals for the past 4 hrs:   BP Temp Temp src Resp   20 1500 -- 97.7  F (36.5  C) Oral 16   20 1340 110/67 98.7  F (37.1  C) Oral 16     SVE: Last 2.5/70/0 on ;     FHT: Baseline 130, modeate variability, accelerations present, decelerations absent   [place]: 4-5 contractions in 10 minutes    A/P:  Ms. Ilda Hernandez is a 30 year old  at 40w4d by LMP c/w 11w5d US, here for IOL with a favorable cervix.    Labor: - S/p AROM at 1331 for clear fluid; pitocin at 5mu/min  FWB: - Category 1 FHT  -Rh negative, Rubella immune, GCT passed. Rhogam PP.  -GBS neg  -Placenta: posterior  -EFW 7#     Anticipate     MARY Gibson MD  Obstetrics and Gyncology, PGY-2  Ob-Gyn PGY-2 Pager: [phone]   20 4:34 PM

## 2020-07-01 NOTE — PROGRESS NOTES
Subjective:   Contractions: barely feeling anything  Leakage of fluids: no        Objective:  Patient Vitals for the past 8 hrs:   BP Temp Temp src Resp   20 1028 101/64 -- -- 16   20 0900 107/67 -- -- 18   20 0858 -- 98.3  F (36.8  C) Oral --     Cervix: very difficult with low fetal station, 3/80/-1  Membranes: AROM  clear amniotic fluid    EFM:   130 baseline, moderate variablility, + accels, no decels, Category I  Tocometer: external monitor, frequency q 2-4 minutes and Pitocin @ 5 mU    Assessment:/Plan:   Labor: induced with Pitocin  Discussed epidural when she desires. No questions. Anticipate     YOLANDA MONTALVO MD

## 2020-07-01 NOTE — PLAN OF CARE
Pt doing well this afternoon. VSS, afebrile. Induction started with pitocin started at 1028. FHTs cat 1, ctxs now 2-3min apart with pitocin at 5mu. AROM at 1331, clear fluid. Pt tolerating labor well, just started feeling ctxs as mild cramps, desire epidural when things intensify. Pt coping with birthing ball and position changes. Will continue with plan of care.

## 2020-07-01 NOTE — PLAN OF CARE
Pt. Is 40w4d pregnant who  is here for IOL for post dates.  Pt denies ROM, bleeding or contractions.  EFM applied for NST.  Pt oriented to call light. And Dr. Lombardo and Dr. Wadsworth notified of pt arrival and status. Plan to induce with pitocin, pt was 3cm in clinic. Pitocin started at 1028.

## 2020-07-01 NOTE — PROGRESS NOTES
[facility]  Labor Progress Note    S:  Patient feels well, very comfortable with her epidural. Denies feeling of needing to defecate or constant pressure. Prefers to limit cervical exams.     O:   Patient Vitals for the past 4 hrs:   Temp Temp src Resp   20 1500 97.7  F (36.5  C) Oral 16     SVE: 5/80/0    FHT: Baseline 130, modeate variability, accelerations present, decelerations absent   [place]: 4-5 contractions in 10 minutes    A/P:  Ms. Ilda Hernandez is a 30 year old  at 40w4d by LMP c/w 11w5d US, here for IOL with a favorable cervix.    Labor: - S/p AROM at 1331 for clear fluid; pitocin at 5mu/min  FWB: - Category 1 FHT  -Rh negative, Rubella immune, GCT passed. Rhogam PP.  -GBS neg  -Placenta: posterior  -EFW 7#     Anticipate     MARY Gibson MD  Obstetrics and Gyncology, PGY-2  Ob-Gyn PGY-2 Pager: [phone]   20 5:36 PM

## 2020-07-01 NOTE — ANESTHESIA PROCEDURE NOTES
Epidural Procedure Note  Staff -   Anesthesiologist:  Amada Laughlin MD  Resident/Fellow: Penelope Gonzalez MD    Performed By: Resident and anesthesiologist  Procedure performed by resident/CRNA in presence of a teaching physician.          Date/Time: 7/1/2020 4:13 PM    Location: OB   Pre-procedure checklist:   patient identified, IV checked, site marked, risks and benefits discussed, informed consent, monitors and equipment checked, pre-op evaluation and at physician/surgeon's request      Correct Patient: Yes      Correct Position: Yes      Correct Site: Yes      Correct Procedure: Yes      Correct Laterality:  Yes    Site Marked:  Yes  Procedure:     Procedure:  Epidural catheter    ASA:  2    Diagnosis:  Labor    Position:  Sitting    Sterile Prep: chloraprep, alcohol swabs, mask, sterile gloves and patient draped      Insertion site:  L3-4    Local skin infiltration:  1% lidocaine    amount (mL):  3    Approach:  Midline    Needle gauge (G):  17    Needle Length (in):  3.5    Block Needle Type:  Touhy    Injection Technique:  LORT saline    NOAH at (cm):  4.5    Attempts:  1    Redirects:  1    Catheter gauge (G):  19    Catheter threaded easily: Yes      Threaded to cm at skin:  9    Threaded in epidural space (cm):  4.5    Paresthesias:  Yes and Resolved    Aspiration negative for Heme or CSF: Yes      Test dose (mL):  3     Local anesthetic:  Lidocaine 1.5% w/ 1:200,000 epinephrine    Test dose negative for signs of intravascular, subdural or intrathecal injection: Yes

## 2020-07-02 LAB
BLOOD BANK CMNT PATIENT-IMP: NORMAL
BLOOD BANK CMNT PATIENT-IMP: NORMAL

## 2020-07-02 PROCEDURE — 72200001 ZZH LABOR CARE VAGINAL DELIVERY SINGLE

## 2020-07-02 PROCEDURE — 86900 BLOOD TYPING SEROLOGIC ABO: CPT | Performed by: OBSTETRICS & GYNECOLOGY

## 2020-07-02 PROCEDURE — 12000001 ZZH R&B MED SURG/OB UMMC

## 2020-07-02 PROCEDURE — 25000132 ZZH RX MED GY IP 250 OP 250 PS 637: Performed by: STUDENT IN AN ORGANIZED HEALTH CARE EDUCATION/TRAINING PROGRAM

## 2020-07-02 PROCEDURE — 25000125 ZZHC RX 250: Performed by: OBSTETRICS & GYNECOLOGY

## 2020-07-02 PROCEDURE — 59400 OBSTETRICAL CARE: CPT | Performed by: OBSTETRICS & GYNECOLOGY

## 2020-07-02 RX ORDER — AMOXICILLIN 250 MG
2 CAPSULE ORAL 2 TIMES DAILY
Status: DISCONTINUED | OUTPATIENT
Start: 2020-07-02 | End: 2020-07-03 | Stop reason: HOSPADM

## 2020-07-02 RX ORDER — AMOXICILLIN 250 MG
1 CAPSULE ORAL 2 TIMES DAILY
Status: DISCONTINUED | OUTPATIENT
Start: 2020-07-02 | End: 2020-07-03 | Stop reason: HOSPADM

## 2020-07-02 RX ORDER — OXYTOCIN 10 [USP'U]/ML
10 INJECTION, SOLUTION INTRAMUSCULAR; INTRAVENOUS
Status: DISCONTINUED | OUTPATIENT
Start: 2020-07-02 | End: 2020-07-03 | Stop reason: HOSPADM

## 2020-07-02 RX ORDER — NALOXONE HYDROCHLORIDE 0.4 MG/ML
.1-.4 INJECTION, SOLUTION INTRAMUSCULAR; INTRAVENOUS; SUBCUTANEOUS
Status: DISCONTINUED | OUTPATIENT
Start: 2020-07-02 | End: 2020-07-03 | Stop reason: HOSPADM

## 2020-07-02 RX ORDER — CARBOPROST TROMETHAMINE 250 UG/ML
250 INJECTION, SOLUTION INTRAMUSCULAR
Status: DISCONTINUED | OUTPATIENT
Start: 2020-07-02 | End: 2020-07-03 | Stop reason: HOSPADM

## 2020-07-02 RX ORDER — METHYLERGONOVINE MALEATE 0.2 MG/ML
200 INJECTION INTRAVENOUS
Status: DISCONTINUED | OUTPATIENT
Start: 2020-07-02 | End: 2020-07-03 | Stop reason: HOSPADM

## 2020-07-02 RX ORDER — MODIFIED LANOLIN
OINTMENT (GRAM) TOPICAL
Status: DISCONTINUED | OUTPATIENT
Start: 2020-07-02 | End: 2020-07-03 | Stop reason: HOSPADM

## 2020-07-02 RX ORDER — MISOPROSTOL 200 UG/1
800 TABLET ORAL
Status: DISCONTINUED | OUTPATIENT
Start: 2020-07-02 | End: 2020-07-03 | Stop reason: HOSPADM

## 2020-07-02 RX ORDER — OXYTOCIN/0.9 % SODIUM CHLORIDE 30/500 ML
100 PLASTIC BAG, INJECTION (ML) INTRAVENOUS CONTINUOUS
Status: DISCONTINUED | OUTPATIENT
Start: 2020-07-02 | End: 2020-07-03 | Stop reason: HOSPADM

## 2020-07-02 RX ORDER — BISACODYL 10 MG
10 SUPPOSITORY, RECTAL RECTAL DAILY PRN
Status: DISCONTINUED | OUTPATIENT
Start: 2020-07-04 | End: 2020-07-03 | Stop reason: HOSPADM

## 2020-07-02 RX ORDER — OXYTOCIN/0.9 % SODIUM CHLORIDE 30/500 ML
340 PLASTIC BAG, INJECTION (ML) INTRAVENOUS CONTINUOUS PRN
Status: DISCONTINUED | OUTPATIENT
Start: 2020-07-02 | End: 2020-07-03 | Stop reason: HOSPADM

## 2020-07-02 RX ORDER — IBUPROFEN 800 MG/1
800 TABLET, FILM COATED ORAL EVERY 6 HOURS PRN
Status: DISCONTINUED | OUTPATIENT
Start: 2020-07-02 | End: 2020-07-03 | Stop reason: HOSPADM

## 2020-07-02 RX ORDER — ACETAMINOPHEN 325 MG/1
650 TABLET ORAL EVERY 4 HOURS PRN
Status: DISCONTINUED | OUTPATIENT
Start: 2020-07-02 | End: 2020-07-03 | Stop reason: HOSPADM

## 2020-07-02 RX ORDER — HYDROCORTISONE 2.5 %
CREAM (GRAM) TOPICAL 3 TIMES DAILY PRN
Status: DISCONTINUED | OUTPATIENT
Start: 2020-07-02 | End: 2020-07-03 | Stop reason: HOSPADM

## 2020-07-02 RX ADMIN — IBUPROFEN 800 MG: 800 TABLET, FILM COATED ORAL at 01:31

## 2020-07-02 RX ADMIN — ACETAMINOPHEN 650 MG: 325 TABLET, FILM COATED ORAL at 12:44

## 2020-07-02 RX ADMIN — DOCUSATE SODIUM AND SENNOSIDES 2 TABLET: 8.6; 5 TABLET, FILM COATED ORAL at 20:36

## 2020-07-02 RX ADMIN — IBUPROFEN 800 MG: 800 TABLET, FILM COATED ORAL at 14:36

## 2020-07-02 RX ADMIN — IBUPROFEN 800 MG: 800 TABLET, FILM COATED ORAL at 08:36

## 2020-07-02 RX ADMIN — Medication 340 ML/HR: at 00:29

## 2020-07-02 RX ADMIN — ACETAMINOPHEN 650 MG: 325 TABLET, FILM COATED ORAL at 08:36

## 2020-07-02 RX ADMIN — ACETAMINOPHEN 650 MG: 325 TABLET, FILM COATED ORAL at 20:35

## 2020-07-02 RX ADMIN — DOCUSATE SODIUM AND SENNOSIDES 2 TABLET: 8.6; 5 TABLET, FILM COATED ORAL at 08:36

## 2020-07-02 RX ADMIN — ACETAMINOPHEN 650 MG: 325 TABLET, FILM COATED ORAL at 04:13

## 2020-07-02 RX ADMIN — IBUPROFEN 800 MG: 800 TABLET, FILM COATED ORAL at 20:35

## 2020-07-02 NOTE — L&D DELIVERY NOTE
Delivery Summary    Ilda Hernandez MRN# [id number]   Age: 30 year old YOB: 1990     ASSESSMENT & PLAN:   Vaginal Delivery Summary    40w5d admitted for eIOL with favorable cervix. Pitocin started and AROM clear fluid, GBS negative. Epidural for pain control and max rate of pitocin 12 mU. Progressed to complete and pushed with good maternal effort.           Pushed to deliver viable female infant on 2020 at 0025 with spontaneous cry.   Anterior shoulder delivered with ease followed by posterior shoulder.   Put on mother's chest where delayed cord clamping was done.    Placenta spontaneous with controlled traction on the cord, intact, 3 vessels and Pitocin IV given with good tone.       2nd degree laceration repaired using 3-0 Vicryl suture in the usual fashion.  Mother and infant in stable condition.  No complications.    YOLANDA MONTALVO MD           Labor Event Times    Labor onset date:  20 Onset time:   1:30 PM   Dilation complete date:  20 Complete time:  10:15 PM   Start pushing date/time:  2020 2230      Labor Length    1st Stage (hrs):  8 (min):  45   2nd Stage (hrs):  2 (min):  10   3rd Stage (hrs):  0 (min):  4      Labor Events     labor?:  No   steroids:  None  Labor Type:  AROM, Induction/Cervical ripening  Predominate monitoring during 1st stage:  continuous electronic fetal monitoring     Antibiotics received during labor?:  No     Rupture date/time: 20 1331   Rupture type:  Artificial Rupture of Membranes  Fluid color:  Clear  Fluid odor:  Normal     Induction:  Oxytocin, AROM  Induction date/time:     Cervical ripening date/time:     Indications for induction:  Elective     Augmentation:  Oxytocin  Augmentation date/time:  20 1230   1:1 continuous labor support provided by?:  RN Labor partogram used?:  no      Delivery/Placenta Date and Time    Delivery Date:  20 Delivery Time:  12:25 AM   Placenta Date/Time:  2020  12:29 AM  Oxytocin given at the time of delivery:  after delivery of placenta     Vaginal Counts     Initial count performed by 2 team members:   Two Team Members   crispin gutierrez Dr.        [place] [employer] [place] [employer]   Initial counts 2 0 5    Added to count  2     Final counts 2 2 5    Placed during labor Accounted for at the end of labor   NA NA   NA NA   NA NA    Final count performed by 2 team members:   Two Team Members   Fe Gutierrez       Final count correct?:  Yes     Apgars    Living status:  Living   1 Minute 5 Minute 10 Minute 15 Minute 20 Minute   Skin color: 1  1       Heart rate: 2  2       Reflex irritability: 2  2       Muscle tone: 2  2       Respiratory effort: 2  2       Total: 9  9       Apgars assigned by:  MIRYAM HAMPTON RN     Cord    Vessels:  3 Vessels Complications:  None   Cord Blood Disposition:  Lab Gases Sent?:  No      [place] Resuscitation    Methods:  None   Care at Delivery:  Baby girl at 0025 to moms chest, baby dried and stimulated with warm blankets, baby pink, lusty cry noted.  Cord clamped by MD and cut by FOB.  Baby skin to skin with mother. VSS. Continue with routine  cares and assessments.       Labor Events and Shoulder Dystocia    Shoulder dystocia present?:  Neg     Delivery (Maternal) (Provider to Complete) ([id number])    Episiotomy:  None  Perineal lacerations:  2nd Repaired?:  Yes      Blood Loss  Mother: Ilda Hernandez #[id number]   Start of Mother's Information    IO Blood Loss  20 1330 - 20 0152    Delivery QBL (mL) Hospital Encounter 548 mL    Total  548 mL         End of Mother's Information  Mother: Ilda Hernandez #[id number]         Delivery - Provider to Complete ([id number])    Delivering clinician:  Cherrie Wadsworth MD  Delivery Type (Choose the 1 that will go to the Birth History):  Vaginal, Spontaneous   Other personnel:   Provider Role   Cherrie Wadsworth MD Physician   Alvaro  Siri Plata MD Resident   Fe Russell RN Registered Nurse         Placenta    Delayed Cord Clamping:  Done  Date/Time:  7/2/2020 12:29 AM  Removal:  Spontaneous  Disposition:  Hospital disposal     Anesthesia    Method:  Epidural  Cervical dilation at placement:  4-7          Presentation and Position    Presentation:  Vertex   Occiput Anterior           YOLANDA MONTALVO MD

## 2020-07-02 NOTE — PLAN OF CARE
Vaginal Delivery Note   of viable Female with Dr. Wadsworth and Dr. John in attendance.Nursery RN Fe NICOLAS present.  Infant with spontaneous cry, to mother's abdomen, dried and stimulated.  Apgars 9 &9.  Placenta delivered with out complication, 2nd degree laceration, with repair, michael cares provided.  Mother and baby in stable condition.

## 2020-07-02 NOTE — PLAN OF CARE
Pt leg still numb due to Epidural. Used Liz steady to go pee but unable to pee. Bladder scan of 573. Straight cath at 0430 with 300 ml. Took Tylenol and Ibuprofen for pain. Pt had double mastectomy and doing Donor milk for feeding.  in the room. PIV saline locked. Will continue to follow POC.

## 2020-07-02 NOTE — PROGRESS NOTES
Pt now complete and pushing well, 2+ station.    EFM:   130 baseline, moderate variablility, + accels, variables intermittent, Category II     [place]: contractions q2 min on pitocin 12 mU    Anticipate .    Cherrie Wadsworth MD

## 2020-07-02 NOTE — PROGRESS NOTES
[facility]  Labor Progress Note    S:  Patient feels well, very comfortable with her epidural.     O:   Patient Vitals for the past 4 hrs:   BP Temp Temp src Heart Rate Resp SpO2   20 1905 103/69 -- -- -- -- 100 %   20 1800 101/62 98.4  F (36.9  C) Oral -- 20 99 %   20 1730 96/70 -- -- -- -- 100 %   20 1700 108/67 98.8  F (37.1  C) Oral 59 18 99 %     SVE: Last 2.5/70/0 on . Deferred cervical exams.     FHT: Baseline 120, modeate variability, accelerations present, decelerations absent   [place]: 3 contractions in 10 minutes    A/P:  Ms. Ilda Hernandez is a 30 year old  at 40w4d by LMP c/w 11w5d US, here for IOL with a favorable cervix.    Labor: - S/p AROM at 1331 for clear fluid; pitocin at 8mu/min. SVE 5/80/0 at 1740.  FWB: - Category 1 FHT  -Rh negative, Rubella immune, GCT passed. Rhogam PP.  -GBS neg  -Placenta: posterior  -EFW 7#     Anticipate     Siri John MD  Obstetrics and Gyncology, PGY-2  Ob-Gyn PGY-2 Pager: [phone]   20 7:24 PM

## 2020-07-02 NOTE — PLAN OF CARE
Patient arrived to [facility] unit via wheelchair at 0255 ,with belongings, accompanied by spouse/ significant other, with infant in arms. Received report from  INGA Garcia  and checked bands. Unit and room orientation completd. Call light given; no concerns present at this time. Continue with plan of care.

## 2020-07-02 NOTE — PROGRESS NOTES
Anesthesia Post-Partum Follow-Up Note After  with Epidural    Patient: Ilda Hernandez    Patient location: Post-partum floor.    Anesthesia type: Epidural    Subjective:     She reports that her epidural worked very well for her delivery. She is still endorsing weakness and numbness in her legs and had to have the nurse help her to the bathroom and she required one straight catheterization overnight. She reports that she is steadily feeling stronger in her legs and that they almost feel normal at this point.  She denies having a headache. She is tolerating a regular diet.    Objective:    Respiratory Function (RR / SpO2 / Airway Patency): Satisfactory    Cardiac Function (HR / Rhythm / BP): Satisfactory    Last Vitals: BP 92/65   Pulse 60   Temp 36.7  C (98.1  F) (Oral)   Resp 18   LMP 2019   SpO2 97%   Breastfeeding Unknown     Assessment and plan:   Ilda Hernandez is a 30 year old female  post-partum #0 s/p spontaneous vaginal delivery. An epidural catheter was successfully inserted at the level of L3-4 without technical challenges. This successfully provided labor analgesia via PCEA pump infusing Bupivacaine 0.125% with Fentanyl 2mcg/mL. The patient delivered via  and the epidural catheter was removed immediately thereafter by the L&D RN.    At this time, there is no evidence of adverse side effects associated with the insertion or removal of the epidural catheter. I anticipate that her strength and sensation will gradually improve throughout the day. If she is still having difficulty walking at the end of the day please notify the anesthesia team. Additionally, if she develops new lower extremity paresis or paresthesias; or if there are concerns regarding the insertion site of the catheter, please reach out to the Dept of Anesthesia.    Thank you for including us in the care for this patient.    Penelope KAM  Anesthesia Resident, PGY3

## 2020-07-02 NOTE — PLAN OF CARE
. Mother and baby transferred to postpartum unit at 0255 via wheelchair after completion of immediate recovery period. Patient oriented to room and instructed to call for assistance when up to the bathroom the next time. Report given to INGA Pinzon who assumes patient care. Mother and baby bonding well and in stable condition upon transfer.

## 2020-07-02 NOTE — PLAN OF CARE
Stable and able to empty her bladder spontaneously. Taking motrin and tylenol for perineal discomfort/pressure.She's bonding well with baby. Will continue with plan of care.

## 2020-07-02 NOTE — PROGRESS NOTES
Subjective:   Contractions: not feeling them  Leakage of fluids: clear        Objective:  Patient Vitals for the past 8 hrs:   BP Temp Temp src Heart Rate Resp SpO2   07/01/20 1930 105/60 -- -- -- -- 99 %   07/01/20 1905 103/69 98.4  F (36.9  C) Oral -- -- 100 %   07/01/20 1800 101/62 98.4  F (36.9  C) Oral -- 20 99 %   07/01/20 1730 96/70 -- -- -- -- 100 %   07/01/20 1700 108/67 98.8  F (37.1  C) Oral 59 18 99 %   07/01/20 1500 -- 97.7  F (36.5  C) Oral -- 16 --   07/01/20 1340 110/67 98.7  F (37.1  C) Oral -- 16 --     Cervix: 8/90/0    EFM:   120 baseline, moderate variablility, + accels, no decels, Category I  Tocometer: external monitor, frequency q 2-3 minutes and Pitocin @ 10 mU    Assessment:/Plan:   Labor: induced with Pitocin. Making normal average progress.    Will recheck in 2-3 hours and should be complete and ready to push then. Questions answered.     YOLANDA MONTALVO MD

## 2020-07-02 NOTE — DISCHARGE SUMMARY
[facility] Discharge Summary    Ilda Hernandez MRN# [id number]   Age: 30 year old YOB: 1990     Date of Admission:  2020  Date of Discharge::  7/3/2020  Admitting Physician:  Nancy Carpenter MD  Discharge Physician:  Mine Shen MD          Admission Diagnoses:   -IUP at 40w5d  -BRCA1 carrier, s/p double mastectomy  -Rh negative status          Discharge Diagnosis:   -IUP at 40w5d, now delivered          Procedures:   Procedure(s): -  -Epidural          Medications Prior to Admission:     Medications Prior to Admission   Medication Sig Dispense Refill Last Dose     Prenatal Vit-Fe Fumarate-FA (PRENATAL VITAMIN PO)    Past Week at Unknown time     acetaminophen (TYLENOL) 325 MG tablet Take 2 tablets (650 mg) by mouth every 6 hours as needed for mild pain Start after Delivery.   More than a month at Unknown time     Docusate Sodium (COLACE PO)    More than a month at Unknown time     ibuprofen (ADVIL/MOTRIN) 600 MG tablet Take 1 tablet (600 mg) by mouth every 6 hours as needed for moderate pain Start after delivery                Discharge Medications:        Review of your medicines      CONTINUE these medicines which have NOT CHANGED      Dose / Directions   acetaminophen 325 MG tablet  Commonly known as:  TYLENOL  Used for:  Encounter for prenatal care in third trimester of first pregnancy      Dose:  650 mg  Take 2 tablets (650 mg) by mouth every 6 hours as needed for mild pain Start after Delivery.  Refills:  0     COLACE PO      Refills:  0     ibuprofen 600 MG tablet  Commonly known as:  ADVIL/MOTRIN  Used for:  Encounter for prenatal care in third trimester of first pregnancy      Dose:  600 mg  Take 1 tablet (600 mg) by mouth every 6 hours as needed for moderate pain Start after delivery  Refills:  0     PRENATAL VITAMIN PO      Refills:  0                  Consultations:   Anesthesia          Brief Admission History   Ilda Hernandez is a 30 year old   at 40w4d by LMP c/w 11w5d US who presents today for elective IOL with a favorable cervix.      She states that she is feeling well today.  She denies headache, vision changes, chest pain, shortness of breath, fever, chills, nausea, vomiting or other systemic complaints. She denies vaginal bleeding or loss of fluid and is feeling normal fetal movement.          Brief Intrapartum Course:   40w5d admitted for eIOL with favorable cervix. Pitocin started and AROM clear fluid, GBS negative. Epidural for pain control and max rate of pitocin 12 mU. Progressed to complete and pushed with good maternal effort. Pushed to deliver viable female infant on 2020 at 0025 with spontaneous cry.   Anterior shoulder delivered with ease followed by posterior shoulder.   Put on mother's chest where delayed cord clamping was done. Placenta spontaneous with controlled traction on the cord, intact, 3 vessels and Pitocin IV given with good tone.     2nd degree laceration repaired using 3-0 Vicryl suture in the usual fashion.  Mother and infant in stable condition.  No complications.       Hospital Course:   The patient's hospital course was unremarkable.  On discharge, her pain was well controlled. Vaginal bleeding is similar to peak menstrual flow.  Voiding without difficulty.  Ambulating well and tolerating a normal diet.  No fever. Bottlefeeding.  Infant is stable.  She was discharged on post-partum day 1.    Post-partum hemoglobin: pending          Discharge Instructions and Follow-Up:   Discharge diet: Regular   Discharge activity: Pelvic rest for 6 weeks including no sexual intercourse, tampons, or douching.    Discharge follow-up: Follow up with your primary OB for a routine postpartum visit in 6 weeks           Discharge Disposition:   Discharged to home in stable condition      Siri John MD  Obstetrics and Gynecology, PGY-2  20. 6:35 AM

## 2020-07-03 VITALS
OXYGEN SATURATION: 97 % | RESPIRATION RATE: 16 BRPM | SYSTOLIC BLOOD PRESSURE: 89 MMHG | HEART RATE: 74 BPM | DIASTOLIC BLOOD PRESSURE: 54 MMHG | TEMPERATURE: 97.9 F

## 2020-07-03 LAB — HGB BLD-MCNC: 11.6 G/DL (ref 11.7–15.7)

## 2020-07-03 PROCEDURE — 25000132 ZZH RX MED GY IP 250 OP 250 PS 637: Performed by: STUDENT IN AN ORGANIZED HEALTH CARE EDUCATION/TRAINING PROGRAM

## 2020-07-03 PROCEDURE — 85018 HEMOGLOBIN: CPT | Performed by: STUDENT IN AN ORGANIZED HEALTH CARE EDUCATION/TRAINING PROGRAM

## 2020-07-03 PROCEDURE — 36415 COLL VENOUS BLD VENIPUNCTURE: CPT | Performed by: STUDENT IN AN ORGANIZED HEALTH CARE EDUCATION/TRAINING PROGRAM

## 2020-07-03 RX ADMIN — IBUPROFEN 800 MG: 800 TABLET, FILM COATED ORAL at 06:23

## 2020-07-03 RX ADMIN — DOCUSATE SODIUM AND SENNOSIDES 2 TABLET: 8.6; 5 TABLET, FILM COATED ORAL at 09:09

## 2020-07-03 RX ADMIN — IBUPROFEN 800 MG: 800 TABLET, FILM COATED ORAL at 12:43

## 2020-07-03 RX ADMIN — ACETAMINOPHEN 650 MG: 325 TABLET, FILM COATED ORAL at 06:23

## 2020-07-03 RX ADMIN — ACETAMINOPHEN 650 MG: 325 TABLET, FILM COATED ORAL at 12:43

## 2020-07-03 NOTE — PLAN OF CARE
Data: Vital signs and postpartum checks WDL  Patient eating and drinking normally. Patient able to empty bladder independently and is up ambulating.  Patient performing self cares and is able to care for infant. Breastfeeding on demand.  Action: Patient medicated during the shift for pain with Tylenol and Ibuprofen with relief after 1 hour. Patient education done see education record.  Response: Positive attachment behaviors observed with infant. Support persons  present.    Plan: Continue with the plan of care

## 2020-07-03 NOTE — PLAN OF CARE
Patient has been stable and discharge to home today with baby. Discharge instructions and education reviewed and questions were addressed.

## 2020-07-03 NOTE — PROGRESS NOTES
Post Partum Progress Note  PPD#1    Subjective:  She is resting comfortably in bed this morning. Pain is improving and well controlled on current medication regimen. She is tolerating PO intake. Lochia present and similar to peak menses.  She is voiding without difficulty. She has passed flatus and has not had a BM. She is ambulating without dizziness or difficulty.  She denies headache, changes in vision, nausea/vomiting, chest pain, shortness of breath, RUQ pain, or worsening edema.  Plans to bottle feed.    Objective:  Vitals:    20 0300 20 0837 20 1452 20 0000   BP: 100/66 92/65 103/67 100/61   Pulse: 60 78 74    Resp: 18 16 16 17   Temp: 98.3  F (36.8  C) 98.1  F (36.7  C) 97.7  F (36.5  C) 98.1  F (36.7  C)   TempSrc: Oral Oral  Oral   SpO2: 97%        General: NAD, resting comfortably  CV: Regular rate, well perfused.   Pulm: Normal respiratory effort.  Abd: Soft, non-tender, non-distended. Fundus is firm and 3 cm below the umbilicus.    Ext: trace lower extremity edema bilaterally. No calf tenderness.    Assessment/Plan:  Ilda Hernandez is a 30 year old  female who is PPD#1 s/p . History of BRCA1 s/p b/l mastectomy.    - Encourage routine post-operative goals including ambulation and incentive spirometry  - PNC: Rh negative. Follow up eval  - Rubella immune. No intervention indicated.  - Pain: controlled on oral medications  - Heme: Hgb 12.8>>AM Hgb pending.  If <10 will discharge home with iron.  - GI: continue anti-emetics and stool softeners as needed.  - : Voiding spontaneously.  - Infant: Stable in room  - Feeding: Plans on bottle feeding.  - BC: Plans on condoms.    Discharge to home today.    Siri John MD  Obstetrics and Gyncology, PGY-1  July 3, 2020. 6:34 AM      Attestation:   This patient was seen and evaluated by me, separately from the house staff team. I have reviewed the note/plan above and agree.     Doing well and did sitz bath last night  which really helped her. Has meds already, no questions. Moving so no pp visit here but discussed could do virtual if desires about 6 weeks.    Hemoglobin   Date Value Ref Range Status   07/03/2020 11.6 (L) 11.7 - 15.7 g/dL Final   ]  Cherrie Wadsworth MD

## 2020-12-27 ENCOUNTER — HEALTH MAINTENANCE LETTER (OUTPATIENT)
Age: 30
End: 2020-12-27

## 2021-06-04 VITALS — BODY MASS INDEX: 22.27 KG/M2 | HEIGHT: 66 IN

## 2021-06-04 VITALS — WEIGHT: 138 LBS | BODY MASS INDEX: 22.96 KG/M2

## 2021-06-07 NOTE — TELEPHONE ENCOUNTER
Returned Ilda's call:  She states that she has had a double mastectomy and is looking for information on donor milk and appropriate storage and care of donor milk.  Wondering if this is information she can get at visit today.  Assured Ilda that she can get this info today, and that this writer will compile resources for her.  To keep appt for 4 pm today.

## 2021-06-07 NOTE — TELEPHONE ENCOUNTER
Pt has questions about milk bank as well as getting some milk from a friend and questions as to freezing and thawing it. Please call her at [phone]

## 2021-10-09 ENCOUNTER — HEALTH MAINTENANCE LETTER (OUTPATIENT)
Age: 31
End: 2021-10-09

## 2022-01-29 ENCOUNTER — HEALTH MAINTENANCE LETTER (OUTPATIENT)
Age: 32
End: 2022-01-29

## 2022-09-17 ENCOUNTER — HEALTH MAINTENANCE LETTER (OUTPATIENT)
Age: 32
End: 2022-09-17

## 2023-05-06 ENCOUNTER — HEALTH MAINTENANCE LETTER (OUTPATIENT)
Age: 33
End: 2023-05-06

## (undated) DEVICE — GLOVE PROTEXIS W/NEU-THERA 7.0  2D73TE70

## (undated) DEVICE — SU VICRYL 3-0 SH 27" J316H

## (undated) DEVICE — DRAIN JACKSON PRATT RESERVOIR 100ML SU130-1305

## (undated) DEVICE — SOL NACL 0.9% INJ 1000ML BAG 07983-09

## (undated) DEVICE — Device

## (undated) DEVICE — SUCTION MANIFOLD DORNOCH ULTRA CART UL-CL500

## (undated) DEVICE — ESU GROUND PAD ADULT W/CORD E7507

## (undated) DEVICE — STPL SKIN 35W 059037

## (undated) DEVICE — ESU ELEC BLADE 6" COATED/INSULATED E1455-6

## (undated) DEVICE — DRAIN JACKSON PRATT CHANNEL 15FR ROUND HUBLESS SIL JP-2228

## (undated) DEVICE — DRSG KERLIX 4 1/2"X4YDS ROLL 6715

## (undated) DEVICE — SOL ADH LIQUID BENZOIN SWAB 0.6ML C1544

## (undated) DEVICE — ESU ELEC BLADE 2.75" COATED/INSULATED E1455

## (undated) DEVICE — ESU PENCIL SMOKE EVAC W/ROCKER SWITCH 0703-047-000

## (undated) DEVICE — PHOTON GUIDE INVUITY WIDE FLAT 104015

## (undated) DEVICE — LINEN TOWEL PACK X6 WHITE 5487

## (undated) DEVICE — SOL NACL 0.9% IRRIG 1000ML BOTTLE 2F7124

## (undated) DEVICE — SU PDS II 3-0 FS-1 27" CLR  Z442H

## (undated) DEVICE — ESU ELEC BLADE 6" COATED E1450-6

## (undated) DEVICE — SU SILK 3-0 SH 30" K832H

## (undated) DEVICE — SU PLAIN FAST ABSORB 5-0 PC-1 18" 1915G

## (undated) DEVICE — DRSG ABDOMINAL 07 1/2X8" 7197D

## (undated) DEVICE — SUCTION TIP YANKAUER STR K87

## (undated) DEVICE — DRAPE U SPLIT 74X120" 29440

## (undated) DEVICE — DRAPE IOBAN INCISE 23X17" 6650EZ

## (undated) DEVICE — PREP CHLORAPREP 26ML TINTED ORANGE  260815

## (undated) DEVICE — BNDG ELASTIC 6" DBL LENGTH UNSTERILE 6611-16

## (undated) DEVICE — LINEN TOWEL PACK X30 5481

## (undated) DEVICE — SOL WATER IRRIG 1000ML BOTTLE 2F7114

## (undated) DEVICE — SU ETHILON 3-0 PS-1 18" 1663H

## (undated) DEVICE — GLOVE PROTEXIS POWDER FREE SMT 8.0  2D72PT80X

## (undated) RX ORDER — SCOLOPAMINE TRANSDERMAL SYSTEM 1 MG/1
PATCH, EXTENDED RELEASE TRANSDERMAL
Status: DISPENSED
Start: 2018-06-11

## (undated) RX ORDER — HYDROMORPHONE HYDROCHLORIDE 1 MG/ML
INJECTION, SOLUTION INTRAMUSCULAR; INTRAVENOUS; SUBCUTANEOUS
Status: DISPENSED
Start: 2018-06-11

## (undated) RX ORDER — CEFAZOLIN SODIUM 1 G/3ML
INJECTION, POWDER, FOR SOLUTION INTRAMUSCULAR; INTRAVENOUS
Status: DISPENSED
Start: 2018-06-11

## (undated) RX ORDER — DEXAMETHASONE SODIUM PHOSPHATE 4 MG/ML
INJECTION, SOLUTION INTRA-ARTICULAR; INTRALESIONAL; INTRAMUSCULAR; INTRAVENOUS; SOFT TISSUE
Status: DISPENSED
Start: 2018-06-11

## (undated) RX ORDER — PROPOFOL 10 MG/ML
INJECTION, EMULSION INTRAVENOUS
Status: DISPENSED
Start: 2018-06-11

## (undated) RX ORDER — FENTANYL CITRATE 50 UG/ML
INJECTION, SOLUTION INTRAMUSCULAR; INTRAVENOUS
Status: DISPENSED
Start: 2018-06-11

## (undated) RX ORDER — ACETAMINOPHEN 325 MG/1
TABLET ORAL
Status: DISPENSED
Start: 2018-06-11

## (undated) RX ORDER — SODIUM CHLORIDE, SODIUM LACTATE, POTASSIUM CHLORIDE, CALCIUM CHLORIDE 600; 310; 30; 20 MG/100ML; MG/100ML; MG/100ML; MG/100ML
INJECTION, SOLUTION INTRAVENOUS
Status: DISPENSED
Start: 2018-06-11

## (undated) RX ORDER — NEOSTIGMINE METHYLSULFATE 1 MG/ML
VIAL (ML) INJECTION
Status: DISPENSED
Start: 2018-06-11

## (undated) RX ORDER — PHENYLEPHRINE HCL IN 0.9% NACL 1 MG/10 ML
SYRINGE (ML) INTRAVENOUS
Status: DISPENSED
Start: 2018-06-11

## (undated) RX ORDER — ONDANSETRON 2 MG/ML
INJECTION INTRAMUSCULAR; INTRAVENOUS
Status: DISPENSED
Start: 2018-06-11

## (undated) RX ORDER — GLYCOPYRROLATE 0.2 MG/ML
INJECTION, SOLUTION INTRAMUSCULAR; INTRAVENOUS
Status: DISPENSED
Start: 2018-06-11

## (undated) RX ORDER — CEFAZOLIN SODIUM 2 G/100ML
INJECTION, SOLUTION INTRAVENOUS
Status: DISPENSED
Start: 2018-06-11

## (undated) RX ORDER — EPHEDRINE SULFATE 50 MG/ML
INJECTION, SOLUTION INTRAMUSCULAR; INTRAVENOUS; SUBCUTANEOUS
Status: DISPENSED
Start: 2018-06-11